# Patient Record
Sex: MALE | Race: WHITE | Employment: OTHER | URBAN - METROPOLITAN AREA
[De-identification: names, ages, dates, MRNs, and addresses within clinical notes are randomized per-mention and may not be internally consistent; named-entity substitution may affect disease eponyms.]

---

## 2017-01-03 RX ORDER — FUROSEMIDE 40 MG/1
TABLET ORAL
Qty: 30 TABLET | Refills: 3 | Status: SHIPPED | OUTPATIENT
Start: 2017-01-03 | End: 2017-06-20 | Stop reason: SDUPTHER

## 2017-01-06 ENCOUNTER — OFFICE VISIT (OUTPATIENT)
Dept: FAMILY MEDICINE CLINIC | Age: 82
End: 2017-01-06

## 2017-01-06 VITALS
HEART RATE: 76 BPM | SYSTOLIC BLOOD PRESSURE: 120 MMHG | BODY MASS INDEX: 45.89 KG/M2 | DIASTOLIC BLOOD PRESSURE: 70 MMHG | TEMPERATURE: 97.8 F | OXYGEN SATURATION: 92 % | WEIGHT: 293 LBS

## 2017-01-06 DIAGNOSIS — E11.9 TYPE 2 DIABETES MELLITUS WITHOUT COMPLICATION, WITHOUT LONG-TERM CURRENT USE OF INSULIN (HCC): Primary | ICD-10-CM

## 2017-01-06 DIAGNOSIS — I10 ESSENTIAL HYPERTENSION: ICD-10-CM

## 2017-01-06 DIAGNOSIS — M19.90 ARTHRITIS: ICD-10-CM

## 2017-01-06 DIAGNOSIS — C67.9 BLADDER CARCINOMA (HCC): ICD-10-CM

## 2017-01-06 DIAGNOSIS — I25.10 CORONARY ARTERY DISEASE INVOLVING NATIVE CORONARY ARTERY OF NATIVE HEART WITHOUT ANGINA PECTORIS: ICD-10-CM

## 2017-01-06 LAB
ALT SERPL-CCNC: 36 U/L (ref 10–40)
ANION GAP SERPL CALCULATED.3IONS-SCNC: 13 MMOL/L (ref 3–16)
BUN BLDV-MCNC: 18 MG/DL (ref 7–20)
CALCIUM SERPL-MCNC: 9.3 MG/DL (ref 8.3–10.6)
CHLORIDE BLD-SCNC: 95 MMOL/L (ref 99–110)
CHOLESTEROL, TOTAL: 144 MG/DL (ref 0–199)
CO2: 29 MMOL/L (ref 21–32)
CREAT SERPL-MCNC: <0.5 MG/DL (ref 0.8–1.3)
GFR AFRICAN AMERICAN: >60
GFR NON-AFRICAN AMERICAN: >60
GLUCOSE BLD-MCNC: 164 MG/DL (ref 70–99)
HDLC SERPL-MCNC: 36 MG/DL (ref 40–60)
LDL CHOLESTEROL CALCULATED: 66 MG/DL
POTASSIUM SERPL-SCNC: 4.6 MMOL/L (ref 3.5–5.1)
SODIUM BLD-SCNC: 137 MMOL/L (ref 136–145)
TRIGL SERPL-MCNC: 210 MG/DL (ref 0–150)
TSH REFLEX: 2.56 UIU/ML (ref 0.27–4.2)
VLDLC SERPL CALC-MCNC: 42 MG/DL

## 2017-01-06 PROCEDURE — 36415 COLL VENOUS BLD VENIPUNCTURE: CPT | Performed by: FAMILY MEDICINE

## 2017-01-06 PROCEDURE — 99214 OFFICE O/P EST MOD 30 MIN: CPT | Performed by: FAMILY MEDICINE

## 2017-01-06 RX ORDER — HYDROCODONE BITARTRATE AND ACETAMINOPHEN 5; 325 MG/1; MG/1
TABLET ORAL
Qty: 90 TABLET | Refills: 0 | Status: SHIPPED | OUTPATIENT
Start: 2017-01-06 | End: 2017-01-06 | Stop reason: SDUPTHER

## 2017-01-06 RX ORDER — METFORMIN HYDROCHLORIDE 500 MG/1
500 TABLET, EXTENDED RELEASE ORAL
Qty: 30 TABLET | Refills: 5 | Status: SHIPPED | OUTPATIENT
Start: 2017-01-06 | End: 2017-05-16 | Stop reason: SDUPTHER

## 2017-01-06 RX ORDER — HYDROCODONE BITARTRATE AND ACETAMINOPHEN 5; 325 MG/1; MG/1
TABLET ORAL
Qty: 90 TABLET | Refills: 0 | Status: SHIPPED | OUTPATIENT
Start: 2017-01-06 | End: 2017-05-16 | Stop reason: SDUPTHER

## 2017-01-06 ASSESSMENT — PATIENT HEALTH QUESTIONNAIRE - PHQ9
2. FEELING DOWN, DEPRESSED OR HOPELESS: 1
SUM OF ALL RESPONSES TO PHQ9 QUESTIONS 1 & 2: 1
SUM OF ALL RESPONSES TO PHQ QUESTIONS 1-9: 1
1. LITTLE INTEREST OR PLEASURE IN DOING THINGS: 0

## 2017-01-07 LAB
ESTIMATED AVERAGE GLUCOSE: 159.9 MG/DL
HBA1C MFR BLD: 7.2 %

## 2017-01-24 RX ORDER — ATORVASTATIN CALCIUM 40 MG/1
TABLET, FILM COATED ORAL
Qty: 30 TABLET | Refills: 5 | Status: SHIPPED | OUTPATIENT
Start: 2017-01-24 | End: 2017-08-17 | Stop reason: SDUPTHER

## 2017-02-01 RX ORDER — BUPROPION HYDROCHLORIDE 150 MG/1
TABLET ORAL
Qty: 30 TABLET | Refills: 0 | Status: SHIPPED | OUTPATIENT
Start: 2017-02-01 | End: 2017-03-29 | Stop reason: SDUPTHER

## 2017-02-01 RX ORDER — FLUTICASONE PROPIONATE 50 MCG
SPRAY, SUSPENSION (ML) NASAL
Qty: 16 G | Refills: 0 | Status: SHIPPED | OUTPATIENT
Start: 2017-02-01 | End: 2019-02-07

## 2017-02-23 RX ORDER — POTASSIUM CHLORIDE 750 MG/1
TABLET, EXTENDED RELEASE ORAL
Qty: 30 TABLET | Refills: 0 | Status: SHIPPED | OUTPATIENT
Start: 2017-02-23 | End: 2017-04-13 | Stop reason: SDUPTHER

## 2017-03-20 RX ORDER — TIZANIDINE 4 MG/1
TABLET ORAL
Qty: 30 TABLET | Refills: 3 | Status: SHIPPED | OUTPATIENT
Start: 2017-03-20 | End: 2017-08-17 | Stop reason: SDUPTHER

## 2017-03-29 RX ORDER — BUPROPION HYDROCHLORIDE 150 MG/1
TABLET ORAL
Qty: 30 TABLET | Refills: 5 | Status: SHIPPED | OUTPATIENT
Start: 2017-03-29 | End: 2017-11-01 | Stop reason: SDUPTHER

## 2017-04-07 RX ORDER — LISINOPRIL 10 MG/1
TABLET ORAL
Qty: 30 TABLET | Refills: 3 | Status: SHIPPED | OUTPATIENT
Start: 2017-04-07 | End: 2017-09-06 | Stop reason: SDUPTHER

## 2017-04-13 RX ORDER — POTASSIUM CHLORIDE 750 MG/1
TABLET, FILM COATED, EXTENDED RELEASE ORAL
Qty: 30 TABLET | Refills: 0 | Status: SHIPPED | OUTPATIENT
Start: 2017-04-13 | End: 2019-02-07

## 2017-05-05 RX ORDER — GLIPIZIDE 5 MG/1
TABLET ORAL
Qty: 30 TABLET | Refills: 5 | Status: SHIPPED | OUTPATIENT
Start: 2017-05-05 | End: 2017-12-08 | Stop reason: SDUPTHER

## 2017-05-16 ENCOUNTER — OFFICE VISIT (OUTPATIENT)
Dept: FAMILY MEDICINE CLINIC | Age: 82
End: 2017-05-16

## 2017-05-16 VITALS
WEIGHT: 301 LBS | DIASTOLIC BLOOD PRESSURE: 82 MMHG | OXYGEN SATURATION: 92 % | SYSTOLIC BLOOD PRESSURE: 130 MMHG | HEART RATE: 76 BPM | BODY MASS INDEX: 47.14 KG/M2 | TEMPERATURE: 98 F

## 2017-05-16 DIAGNOSIS — H26.9 CATARACT: ICD-10-CM

## 2017-05-16 DIAGNOSIS — Z01.818 PREOP EXAMINATION: Primary | ICD-10-CM

## 2017-05-16 DIAGNOSIS — E78.00 HYPERCHOLESTEREMIA: ICD-10-CM

## 2017-05-16 DIAGNOSIS — I10 ESSENTIAL HYPERTENSION: ICD-10-CM

## 2017-05-16 DIAGNOSIS — I25.10 CORONARY ARTERY DISEASE INVOLVING NATIVE CORONARY ARTERY OF NATIVE HEART WITHOUT ANGINA PECTORIS: ICD-10-CM

## 2017-05-16 DIAGNOSIS — J43.1 PANLOBULAR EMPHYSEMA (HCC): ICD-10-CM

## 2017-05-16 DIAGNOSIS — M19.90 ARTHRITIS: ICD-10-CM

## 2017-05-16 DIAGNOSIS — C67.9 BLADDER CARCINOMA (HCC): ICD-10-CM

## 2017-05-16 DIAGNOSIS — K21.9 GASTROESOPHAGEAL REFLUX DISEASE WITHOUT ESOPHAGITIS: ICD-10-CM

## 2017-05-16 DIAGNOSIS — E11.9 TYPE 2 DIABETES MELLITUS WITHOUT COMPLICATION, WITHOUT LONG-TERM CURRENT USE OF INSULIN (HCC): ICD-10-CM

## 2017-05-16 PROBLEM — Z86.69 HISTORY OF BELL'S PALSY: Status: ACTIVE | Noted: 2017-05-16

## 2017-05-16 LAB
ALT SERPL-CCNC: 27 U/L (ref 10–40)
ANION GAP SERPL CALCULATED.3IONS-SCNC: 13 MMOL/L (ref 3–16)
BASOPHILS ABSOLUTE: 0.1 K/UL (ref 0–0.2)
BASOPHILS RELATIVE PERCENT: 0.9 %
BUN BLDV-MCNC: 16 MG/DL (ref 7–20)
CALCIUM SERPL-MCNC: 9 MG/DL (ref 8.3–10.6)
CHLORIDE BLD-SCNC: 98 MMOL/L (ref 99–110)
CHOLESTEROL, TOTAL: 118 MG/DL (ref 0–199)
CO2: 28 MMOL/L (ref 21–32)
CREAT SERPL-MCNC: <0.5 MG/DL (ref 0.8–1.3)
EOSINOPHILS ABSOLUTE: 0.1 K/UL (ref 0–0.6)
EOSINOPHILS RELATIVE PERCENT: 1.5 %
GFR AFRICAN AMERICAN: >60
GFR NON-AFRICAN AMERICAN: >60
GLUCOSE BLD-MCNC: 146 MG/DL (ref 70–99)
HCT VFR BLD CALC: 43.3 % (ref 40.5–52.5)
HDLC SERPL-MCNC: 35 MG/DL (ref 40–60)
HEMOGLOBIN: 13.9 G/DL (ref 13.5–17.5)
LDL CHOLESTEROL CALCULATED: 52 MG/DL
LYMPHOCYTES ABSOLUTE: 0.9 K/UL (ref 1–5.1)
LYMPHOCYTES RELATIVE PERCENT: 15.2 %
MCH RBC QN AUTO: 30.7 PG (ref 26–34)
MCHC RBC AUTO-ENTMCNC: 32.1 G/DL (ref 31–36)
MCV RBC AUTO: 95.6 FL (ref 80–100)
MONOCYTES ABSOLUTE: 0.6 K/UL (ref 0–1.3)
MONOCYTES RELATIVE PERCENT: 9.2 %
NEUTROPHILS ABSOLUTE: 4.5 K/UL (ref 1.7–7.7)
NEUTROPHILS RELATIVE PERCENT: 73.2 %
PDW BLD-RTO: 14.4 % (ref 12.4–15.4)
PLATELET # BLD: 294 K/UL (ref 135–450)
PMV BLD AUTO: 8.5 FL (ref 5–10.5)
POTASSIUM SERPL-SCNC: 4.6 MMOL/L (ref 3.5–5.1)
RBC # BLD: 4.53 M/UL (ref 4.2–5.9)
SODIUM BLD-SCNC: 139 MMOL/L (ref 136–145)
TRIGL SERPL-MCNC: 155 MG/DL (ref 0–150)
TSH REFLEX: 1.86 UIU/ML (ref 0.27–4.2)
VLDLC SERPL CALC-MCNC: 31 MG/DL
WBC # BLD: 6.1 K/UL (ref 4–11)

## 2017-05-16 PROCEDURE — 4004F PT TOBACCO SCREEN RCVD TLK: CPT | Performed by: FAMILY MEDICINE

## 2017-05-16 PROCEDURE — 99215 OFFICE O/P EST HI 40 MIN: CPT | Performed by: FAMILY MEDICINE

## 2017-05-16 PROCEDURE — 36415 COLL VENOUS BLD VENIPUNCTURE: CPT | Performed by: FAMILY MEDICINE

## 2017-05-16 PROCEDURE — G8926 SPIRO NO PERF OR DOC: HCPCS | Performed by: FAMILY MEDICINE

## 2017-05-16 PROCEDURE — G8417 CALC BMI ABV UP PARAM F/U: HCPCS | Performed by: FAMILY MEDICINE

## 2017-05-16 PROCEDURE — 1123F ACP DISCUSS/DSCN MKR DOCD: CPT | Performed by: FAMILY MEDICINE

## 2017-05-16 PROCEDURE — G8599 NO ASA/ANTIPLAT THER USE RNG: HCPCS | Performed by: FAMILY MEDICINE

## 2017-05-16 PROCEDURE — 3023F SPIROM DOC REV: CPT | Performed by: FAMILY MEDICINE

## 2017-05-16 PROCEDURE — G8427 DOCREV CUR MEDS BY ELIG CLIN: HCPCS | Performed by: FAMILY MEDICINE

## 2017-05-16 PROCEDURE — 4040F PNEUMOC VAC/ADMIN/RCVD: CPT | Performed by: FAMILY MEDICINE

## 2017-05-16 PROCEDURE — 93000 ELECTROCARDIOGRAM COMPLETE: CPT | Performed by: FAMILY MEDICINE

## 2017-05-16 RX ORDER — METFORMIN HYDROCHLORIDE 500 MG/1
500 TABLET, EXTENDED RELEASE ORAL
Qty: 30 TABLET | Refills: 5 | Status: SHIPPED | OUTPATIENT
Start: 2017-05-16 | End: 2018-01-10 | Stop reason: SDUPTHER

## 2017-05-16 RX ORDER — HYDROCODONE BITARTRATE AND ACETAMINOPHEN 5; 325 MG/1; MG/1
TABLET ORAL
Qty: 90 TABLET | Refills: 0 | Status: SHIPPED | OUTPATIENT
Start: 2017-05-16 | End: 2017-06-26 | Stop reason: SDUPTHER

## 2017-05-16 RX ORDER — BROMFENAC SODIUM 0.7 MG/ML
SOLUTION/ DROPS OPHTHALMIC
COMMUNITY
Start: 2017-05-11 | End: 2017-07-05 | Stop reason: ALTCHOICE

## 2017-05-16 ASSESSMENT — ENCOUNTER SYMPTOMS
CHEST TIGHTNESS: 0
COLOR CHANGE: 0
ABDOMINAL DISTENTION: 0
BACK PAIN: 1
ABDOMINAL PAIN: 0
EYE ITCHING: 0
EYE DISCHARGE: 0
SHORTNESS OF BREATH: 0

## 2017-05-17 LAB
ESTIMATED AVERAGE GLUCOSE: 151.3 MG/DL
HBA1C MFR BLD: 6.9 %

## 2017-06-20 RX ORDER — POTASSIUM CHLORIDE 750 MG/1
TABLET, FILM COATED, EXTENDED RELEASE ORAL
Qty: 30 TABLET | Refills: 5 | Status: SHIPPED | OUTPATIENT
Start: 2017-06-20 | End: 2017-07-05 | Stop reason: ALTCHOICE

## 2017-06-20 RX ORDER — FUROSEMIDE 40 MG/1
TABLET ORAL
Qty: 30 TABLET | Refills: 5 | Status: SHIPPED | OUTPATIENT
Start: 2017-06-20 | End: 2018-01-19 | Stop reason: SDUPTHER

## 2017-06-27 RX ORDER — HYDROCODONE BITARTRATE AND ACETAMINOPHEN 5; 325 MG/1; MG/1
TABLET ORAL
Qty: 90 TABLET | Refills: 0 | Status: SHIPPED | OUTPATIENT
Start: 2017-06-27 | End: 2017-07-25 | Stop reason: SDUPTHER

## 2017-06-30 RX ORDER — FENOFIBRATE 200 MG/1
CAPSULE ORAL
Qty: 30 CAPSULE | Refills: 5 | Status: SHIPPED | OUTPATIENT
Start: 2017-06-30 | End: 2018-01-19 | Stop reason: SDUPTHER

## 2017-07-17 RX ORDER — MELOXICAM 7.5 MG/1
TABLET ORAL
Qty: 30 TABLET | Refills: 3 | Status: SHIPPED | OUTPATIENT
Start: 2017-07-17 | End: 2017-12-07 | Stop reason: SDUPTHER

## 2017-07-25 RX ORDER — HYDROCODONE BITARTRATE AND ACETAMINOPHEN 5; 325 MG/1; MG/1
TABLET ORAL
Qty: 90 TABLET | Refills: 0 | Status: SHIPPED | OUTPATIENT
Start: 2017-07-25 | End: 2017-08-22 | Stop reason: SDUPTHER

## 2017-08-17 RX ORDER — ATORVASTATIN CALCIUM 40 MG/1
TABLET, FILM COATED ORAL
Qty: 30 TABLET | Refills: 5 | Status: SHIPPED | OUTPATIENT
Start: 2017-08-17 | End: 2018-04-04 | Stop reason: SDUPTHER

## 2017-08-17 RX ORDER — TIZANIDINE 4 MG/1
TABLET ORAL
Qty: 30 TABLET | Refills: 5 | Status: SHIPPED | OUTPATIENT
Start: 2017-08-17 | End: 2018-03-23 | Stop reason: SDUPTHER

## 2017-08-23 RX ORDER — HYDROCODONE BITARTRATE AND ACETAMINOPHEN 5; 325 MG/1; MG/1
TABLET ORAL
Qty: 90 TABLET | Refills: 0 | Status: SHIPPED | OUTPATIENT
Start: 2017-08-23 | End: 2017-09-21 | Stop reason: SDUPTHER

## 2017-09-06 RX ORDER — LISINOPRIL 10 MG/1
TABLET ORAL
Qty: 30 TABLET | Refills: 5 | Status: SHIPPED | OUTPATIENT
Start: 2017-09-06 | End: 2018-03-23 | Stop reason: SDUPTHER

## 2017-09-14 ENCOUNTER — OFFICE VISIT (OUTPATIENT)
Dept: FAMILY MEDICINE CLINIC | Age: 82
End: 2017-09-14

## 2017-09-14 VITALS
WEIGHT: 284.2 LBS | SYSTOLIC BLOOD PRESSURE: 136 MMHG | BODY MASS INDEX: 45.87 KG/M2 | OXYGEN SATURATION: 93 % | TEMPERATURE: 97.8 F | DIASTOLIC BLOOD PRESSURE: 70 MMHG | HEART RATE: 73 BPM

## 2017-09-14 DIAGNOSIS — M19.90 ARTHRITIS: ICD-10-CM

## 2017-09-14 DIAGNOSIS — E11.9 TYPE 2 DIABETES MELLITUS WITHOUT COMPLICATION, WITHOUT LONG-TERM CURRENT USE OF INSULIN (HCC): Primary | ICD-10-CM

## 2017-09-14 DIAGNOSIS — I10 ESSENTIAL HYPERTENSION: ICD-10-CM

## 2017-09-14 DIAGNOSIS — I25.10 CORONARY ARTERY DISEASE INVOLVING NATIVE CORONARY ARTERY OF NATIVE HEART WITHOUT ANGINA PECTORIS: ICD-10-CM

## 2017-09-14 DIAGNOSIS — K21.9 GASTROESOPHAGEAL REFLUX DISEASE WITHOUT ESOPHAGITIS: ICD-10-CM

## 2017-09-14 LAB
ALT SERPL-CCNC: 28 U/L (ref 10–40)
ANION GAP SERPL CALCULATED.3IONS-SCNC: 13 MMOL/L (ref 3–16)
BUN BLDV-MCNC: 17 MG/DL (ref 7–20)
CALCIUM SERPL-MCNC: 9.3 MG/DL (ref 8.3–10.6)
CHLORIDE BLD-SCNC: 95 MMOL/L (ref 99–110)
CHOLESTEROL, TOTAL: 124 MG/DL (ref 0–199)
CO2: 31 MMOL/L (ref 21–32)
CREAT SERPL-MCNC: 0.6 MG/DL (ref 0.8–1.3)
GFR AFRICAN AMERICAN: >60
GFR NON-AFRICAN AMERICAN: >60
GLUCOSE BLD-MCNC: 152 MG/DL (ref 70–99)
HDLC SERPL-MCNC: 35 MG/DL (ref 40–60)
LDL CHOLESTEROL CALCULATED: 51 MG/DL
POTASSIUM SERPL-SCNC: 4.5 MMOL/L (ref 3.5–5.1)
SODIUM BLD-SCNC: 139 MMOL/L (ref 136–145)
TRIGL SERPL-MCNC: 190 MG/DL (ref 0–150)
TSH REFLEX: 2.89 UIU/ML (ref 0.27–4.2)
VLDLC SERPL CALC-MCNC: 38 MG/DL

## 2017-09-14 PROCEDURE — 99214 OFFICE O/P EST MOD 30 MIN: CPT | Performed by: FAMILY MEDICINE

## 2017-09-14 PROCEDURE — G8417 CALC BMI ABV UP PARAM F/U: HCPCS | Performed by: FAMILY MEDICINE

## 2017-09-14 PROCEDURE — 1123F ACP DISCUSS/DSCN MKR DOCD: CPT | Performed by: FAMILY MEDICINE

## 2017-09-14 PROCEDURE — G8598 ASA/ANTIPLAT THER USED: HCPCS | Performed by: FAMILY MEDICINE

## 2017-09-14 PROCEDURE — 4004F PT TOBACCO SCREEN RCVD TLK: CPT | Performed by: FAMILY MEDICINE

## 2017-09-14 PROCEDURE — 36415 COLL VENOUS BLD VENIPUNCTURE: CPT | Performed by: FAMILY MEDICINE

## 2017-09-14 PROCEDURE — G0008 ADMIN INFLUENZA VIRUS VAC: HCPCS | Performed by: FAMILY MEDICINE

## 2017-09-14 PROCEDURE — G8427 DOCREV CUR MEDS BY ELIG CLIN: HCPCS | Performed by: FAMILY MEDICINE

## 2017-09-14 PROCEDURE — 90688 IIV4 VACCINE SPLT 0.5 ML IM: CPT | Performed by: FAMILY MEDICINE

## 2017-09-14 PROCEDURE — 4040F PNEUMOC VAC/ADMIN/RCVD: CPT | Performed by: FAMILY MEDICINE

## 2017-09-15 LAB
ESTIMATED AVERAGE GLUCOSE: 165.7 MG/DL
HBA1C MFR BLD: 7.4 %

## 2017-09-18 ENCOUNTER — TELEPHONE (OUTPATIENT)
Dept: FAMILY MEDICINE CLINIC | Age: 82
End: 2017-09-18

## 2017-09-19 RX ORDER — AZITHROMYCIN 250 MG/1
TABLET, FILM COATED ORAL
Qty: 1 PACKET | Refills: 0 | Status: SHIPPED | OUTPATIENT
Start: 2017-09-19 | End: 2017-09-29

## 2017-09-22 RX ORDER — HYDROCODONE BITARTRATE AND ACETAMINOPHEN 5; 325 MG/1; MG/1
TABLET ORAL
Qty: 90 TABLET | Refills: 0 | Status: SHIPPED | OUTPATIENT
Start: 2017-09-22 | End: 2017-10-23 | Stop reason: SDUPTHER

## 2017-10-23 RX ORDER — HYDROCODONE BITARTRATE AND ACETAMINOPHEN 5; 325 MG/1; MG/1
TABLET ORAL
Qty: 90 TABLET | Refills: 0 | Status: SHIPPED | OUTPATIENT
Start: 2017-10-23 | End: 2017-11-21 | Stop reason: SDUPTHER

## 2017-11-01 RX ORDER — BUPROPION HYDROCHLORIDE 150 MG/1
TABLET ORAL
Qty: 30 TABLET | Refills: 5 | Status: SHIPPED | OUTPATIENT
Start: 2017-11-01 | End: 2018-06-11 | Stop reason: SDUPTHER

## 2017-11-21 RX ORDER — HYDROCODONE BITARTRATE AND ACETAMINOPHEN 5; 325 MG/1; MG/1
TABLET ORAL
Qty: 90 TABLET | Refills: 0 | Status: SHIPPED | OUTPATIENT
Start: 2017-11-21 | End: 2017-12-20 | Stop reason: SDUPTHER

## 2017-11-21 NOTE — TELEPHONE ENCOUNTER
Date of last refill of this med was 10/23, # of pills given 90 and # of refills given 0. Their next appointment is 1/18, the last date patient was seen was 9/14. Does patient have medication agreement on file? Yes  Has drug screen been done in last 12 months if needed? no  Has [place] report been ran in last 90 days?  Yes 10/23

## 2017-11-21 NOTE — TELEPHONE ENCOUNTER
Informed the patient that the medication has been printed out and is ready to be picked up at our office rc

## 2017-12-07 RX ORDER — MELOXICAM 7.5 MG/1
TABLET ORAL
Qty: 30 TABLET | Refills: 5 | Status: SHIPPED | OUTPATIENT
Start: 2017-12-07 | End: 2018-07-09 | Stop reason: SDUPTHER

## 2017-12-08 RX ORDER — GLIPIZIDE 5 MG/1
TABLET ORAL
Qty: 30 TABLET | Refills: 5 | Status: SHIPPED | OUTPATIENT
Start: 2017-12-08 | End: 2018-07-09 | Stop reason: SDUPTHER

## 2017-12-20 RX ORDER — HYDROCODONE BITARTRATE AND ACETAMINOPHEN 5; 325 MG/1; MG/1
TABLET ORAL
Qty: 90 TABLET | Refills: 0 | Status: SHIPPED | OUTPATIENT
Start: 2017-12-20 | End: 2018-01-18 | Stop reason: SDUPTHER

## 2018-01-10 RX ORDER — METFORMIN HYDROCHLORIDE 500 MG/1
TABLET, EXTENDED RELEASE ORAL
Qty: 30 TABLET | Refills: 0 | Status: SHIPPED | OUTPATIENT
Start: 2018-01-10 | End: 2018-03-12 | Stop reason: SDUPTHER

## 2018-01-18 ENCOUNTER — OFFICE VISIT (OUTPATIENT)
Dept: FAMILY MEDICINE CLINIC | Age: 83
End: 2018-01-18

## 2018-01-18 VITALS
TEMPERATURE: 98.1 F | SYSTOLIC BLOOD PRESSURE: 147 MMHG | OXYGEN SATURATION: 93 % | DIASTOLIC BLOOD PRESSURE: 73 MMHG | HEART RATE: 72 BPM | BODY MASS INDEX: 48.42 KG/M2 | WEIGHT: 300 LBS

## 2018-01-18 DIAGNOSIS — M19.90 ARTHRITIS: Primary | ICD-10-CM

## 2018-01-18 DIAGNOSIS — I10 ESSENTIAL HYPERTENSION: ICD-10-CM

## 2018-01-18 DIAGNOSIS — I25.10 CORONARY ARTERY DISEASE INVOLVING NATIVE CORONARY ARTERY OF NATIVE HEART WITHOUT ANGINA PECTORIS: ICD-10-CM

## 2018-01-18 DIAGNOSIS — Z99.89 OSA ON CPAP: ICD-10-CM

## 2018-01-18 DIAGNOSIS — G47.33 OSA ON CPAP: ICD-10-CM

## 2018-01-18 DIAGNOSIS — E78.00 HYPERCHOLESTEREMIA: ICD-10-CM

## 2018-01-18 DIAGNOSIS — E11.9 TYPE 2 DIABETES MELLITUS WITHOUT COMPLICATION, WITHOUT LONG-TERM CURRENT USE OF INSULIN (HCC): ICD-10-CM

## 2018-01-18 DIAGNOSIS — E66.01 MORBID OBESITY DUE TO EXCESS CALORIES (HCC): ICD-10-CM

## 2018-01-18 LAB
ALT SERPL-CCNC: 25 U/L (ref 10–40)
ANION GAP SERPL CALCULATED.3IONS-SCNC: 11 MMOL/L (ref 3–16)
BASOPHILS ABSOLUTE: 0 K/UL (ref 0–0.2)
BASOPHILS RELATIVE PERCENT: 0.4 %
BUN BLDV-MCNC: 15 MG/DL (ref 7–20)
CALCIUM SERPL-MCNC: 9.4 MG/DL (ref 8.3–10.6)
CHLORIDE BLD-SCNC: 96 MMOL/L (ref 99–110)
CHOLESTEROL, TOTAL: 129 MG/DL (ref 0–199)
CO2: 32 MMOL/L (ref 21–32)
CREAT SERPL-MCNC: 0.6 MG/DL (ref 0.8–1.3)
EOSINOPHILS ABSOLUTE: 0.1 K/UL (ref 0–0.6)
EOSINOPHILS RELATIVE PERCENT: 1.7 %
GFR AFRICAN AMERICAN: >60
GFR NON-AFRICAN AMERICAN: >60
GLUCOSE BLD-MCNC: 156 MG/DL (ref 70–99)
HCT VFR BLD CALC: 42.4 % (ref 40.5–52.5)
HDLC SERPL-MCNC: 35 MG/DL (ref 40–60)
HEMOGLOBIN: 14 G/DL (ref 13.5–17.5)
LDL CHOLESTEROL CALCULATED: 55 MG/DL
LYMPHOCYTES ABSOLUTE: 0.9 K/UL (ref 1–5.1)
LYMPHOCYTES RELATIVE PERCENT: 15.5 %
MCH RBC QN AUTO: 31.5 PG (ref 26–34)
MCHC RBC AUTO-ENTMCNC: 33.1 G/DL (ref 31–36)
MCV RBC AUTO: 95.1 FL (ref 80–100)
MONOCYTES ABSOLUTE: 0.5 K/UL (ref 0–1.3)
MONOCYTES RELATIVE PERCENT: 8.7 %
NEUTROPHILS ABSOLUTE: 4.3 K/UL (ref 1.7–7.7)
NEUTROPHILS RELATIVE PERCENT: 73.7 %
PDW BLD-RTO: 13.8 % (ref 12.4–15.4)
PLATELET # BLD: 290 K/UL (ref 135–450)
PMV BLD AUTO: 8.6 FL (ref 5–10.5)
POTASSIUM SERPL-SCNC: 4.6 MMOL/L (ref 3.5–5.1)
RBC # BLD: 4.45 M/UL (ref 4.2–5.9)
SODIUM BLD-SCNC: 139 MMOL/L (ref 136–145)
TRIGL SERPL-MCNC: 196 MG/DL (ref 0–150)
TSH REFLEX: 2.77 UIU/ML (ref 0.27–4.2)
VLDLC SERPL CALC-MCNC: 39 MG/DL
WBC # BLD: 5.9 K/UL (ref 4–11)

## 2018-01-18 PROCEDURE — G8417 CALC BMI ABV UP PARAM F/U: HCPCS | Performed by: FAMILY MEDICINE

## 2018-01-18 PROCEDURE — G8427 DOCREV CUR MEDS BY ELIG CLIN: HCPCS | Performed by: FAMILY MEDICINE

## 2018-01-18 PROCEDURE — 4040F PNEUMOC VAC/ADMIN/RCVD: CPT | Performed by: FAMILY MEDICINE

## 2018-01-18 PROCEDURE — G8599 NO ASA/ANTIPLAT THER USE RNG: HCPCS | Performed by: FAMILY MEDICINE

## 2018-01-18 PROCEDURE — G8484 FLU IMMUNIZE NO ADMIN: HCPCS | Performed by: FAMILY MEDICINE

## 2018-01-18 PROCEDURE — 4004F PT TOBACCO SCREEN RCVD TLK: CPT | Performed by: FAMILY MEDICINE

## 2018-01-18 PROCEDURE — 36415 COLL VENOUS BLD VENIPUNCTURE: CPT | Performed by: FAMILY MEDICINE

## 2018-01-18 PROCEDURE — 1123F ACP DISCUSS/DSCN MKR DOCD: CPT | Performed by: FAMILY MEDICINE

## 2018-01-18 PROCEDURE — 99214 OFFICE O/P EST MOD 30 MIN: CPT | Performed by: FAMILY MEDICINE

## 2018-01-18 RX ORDER — HYDROCODONE BITARTRATE AND ACETAMINOPHEN 5; 325 MG/1; MG/1
TABLET ORAL
Qty: 90 TABLET | Refills: 0 | Status: SHIPPED | OUTPATIENT
Start: 2018-01-18 | End: 2018-02-16 | Stop reason: SDUPTHER

## 2018-01-18 ASSESSMENT — PATIENT HEALTH QUESTIONNAIRE - PHQ9
2. FEELING DOWN, DEPRESSED OR HOPELESS: 0
SUM OF ALL RESPONSES TO PHQ9 QUESTIONS 1 & 2: 0
1. LITTLE INTEREST OR PLEASURE IN DOING THINGS: 0
SUM OF ALL RESPONSES TO PHQ QUESTIONS 1-9: 0

## 2018-01-18 NOTE — PROGRESS NOTES
Handout\" provided  Avoid exposure to all tobacco products. Read and consider all information provided by the pharmacy regarding prescribed medications before use  Careful medical compliance  Proper diet and weight management   Otherwise continue current treatment plan  Call or return if symptoms are not well controlled  Go to ED if severe/significant symptoms occur    All medical conditions for this patient are stable unless otherwise indicated    Malcolm Moonye MD    This note was transcribed using a voice recognition software system. Proper technique and careful oversight were used to increase transcription accuracy but inadvertent errors may be present.

## 2018-01-19 LAB
ESTIMATED AVERAGE GLUCOSE: 168.6 MG/DL
HBA1C MFR BLD: 7.5 %

## 2018-01-19 RX ORDER — FENOFIBRATE 200 MG/1
CAPSULE ORAL
Qty: 30 CAPSULE | Refills: 5 | Status: SHIPPED | OUTPATIENT
Start: 2018-01-19 | End: 2018-07-09 | Stop reason: SDUPTHER

## 2018-01-19 RX ORDER — FUROSEMIDE 40 MG/1
TABLET ORAL
Qty: 30 TABLET | Refills: 5 | Status: SHIPPED | OUTPATIENT
Start: 2018-01-19 | End: 2018-07-09 | Stop reason: SDUPTHER

## 2018-02-16 ENCOUNTER — TELEPHONE (OUTPATIENT)
Dept: FAMILY MEDICINE CLINIC | Age: 83
End: 2018-02-16

## 2018-02-16 DIAGNOSIS — M19.90 ARTHRITIS: ICD-10-CM

## 2018-02-16 RX ORDER — HYDROCODONE BITARTRATE AND ACETAMINOPHEN 5; 325 MG/1; MG/1
TABLET ORAL
Qty: 90 TABLET | Refills: 0 | Status: SHIPPED | OUTPATIENT
Start: 2018-02-16 | End: 2018-02-16 | Stop reason: SDUPTHER

## 2018-02-16 RX ORDER — HYDROCODONE BITARTRATE AND ACETAMINOPHEN 5; 325 MG/1; MG/1
1 TABLET ORAL 3 TIMES DAILY
Qty: 90 TABLET | Refills: 0
Start: 2018-02-16 | End: 2018-03-19 | Stop reason: SDUPTHER

## 2018-02-16 NOTE — TELEPHONE ENCOUNTER
Date of last refill of this med was 1/18, # of pills given 90 and # of refills given 0. Their next appointment is 5/22, the last date patient was seen was 1/18. Does patient have medication agreement on file? No  Has drug screen been done in last 12 months if needed? no  Has OAR[place] report been ran in last 90 days?  Yes 1/18

## 2018-03-13 RX ORDER — METFORMIN HYDROCHLORIDE 500 MG/1
TABLET, EXTENDED RELEASE ORAL
Qty: 30 TABLET | Refills: 5 | Status: SHIPPED | OUTPATIENT
Start: 2018-03-13 | End: 2018-10-06 | Stop reason: SDUPTHER

## 2018-03-19 DIAGNOSIS — M19.90 ARTHRITIS: ICD-10-CM

## 2018-03-19 RX ORDER — HYDROCODONE BITARTRATE AND ACETAMINOPHEN 5; 325 MG/1; MG/1
1 TABLET ORAL 3 TIMES DAILY
Qty: 90 TABLET | Refills: 0 | Status: SHIPPED | OUTPATIENT
Start: 2018-03-19 | End: 2018-04-18 | Stop reason: SDUPTHER

## 2018-03-23 RX ORDER — TIZANIDINE 4 MG/1
TABLET ORAL
Qty: 30 TABLET | Refills: 3 | Status: SHIPPED | OUTPATIENT
Start: 2018-03-23 | End: 2018-07-09 | Stop reason: SDUPTHER

## 2018-03-23 RX ORDER — LISINOPRIL 10 MG/1
TABLET ORAL
Qty: 30 TABLET | Refills: 3 | Status: SHIPPED | OUTPATIENT
Start: 2018-03-23 | End: 2018-07-09 | Stop reason: SDUPTHER

## 2018-04-04 RX ORDER — POTASSIUM CHLORIDE 750 MG/1
TABLET, EXTENDED RELEASE ORAL
Qty: 30 TABLET | Refills: 5 | Status: SHIPPED | OUTPATIENT
Start: 2018-04-04 | End: 2018-07-09 | Stop reason: SDUPTHER

## 2018-04-04 RX ORDER — ATORVASTATIN CALCIUM 40 MG/1
TABLET, FILM COATED ORAL
Qty: 30 TABLET | Refills: 5 | Status: SHIPPED | OUTPATIENT
Start: 2018-04-04 | End: 2018-07-09 | Stop reason: SDUPTHER

## 2018-04-18 DIAGNOSIS — M19.90 ARTHRITIS: ICD-10-CM

## 2018-04-18 RX ORDER — HYDROCODONE BITARTRATE AND ACETAMINOPHEN 5; 325 MG/1; MG/1
1 TABLET ORAL 3 TIMES DAILY
Qty: 90 TABLET | Refills: 0 | Status: SHIPPED | OUTPATIENT
Start: 2018-04-18 | End: 2018-05-16 | Stop reason: SDUPTHER

## 2018-05-16 DIAGNOSIS — M19.90 ARTHRITIS: ICD-10-CM

## 2018-05-16 RX ORDER — HYDROCODONE BITARTRATE AND ACETAMINOPHEN 5; 325 MG/1; MG/1
1 TABLET ORAL 3 TIMES DAILY
Qty: 90 TABLET | Refills: 0 | Status: SHIPPED | OUTPATIENT
Start: 2018-05-16 | End: 2018-06-15 | Stop reason: SDUPTHER

## 2018-06-11 ENCOUNTER — TELEPHONE (OUTPATIENT)
Dept: FAMILY MEDICINE CLINIC | Age: 83
End: 2018-06-11

## 2018-06-11 DIAGNOSIS — R26.81 UNSTEADY GAIT: Primary | ICD-10-CM

## 2018-06-12 RX ORDER — BUPROPION HYDROCHLORIDE 150 MG/1
TABLET ORAL
Qty: 30 TABLET | Refills: 5 | Status: SHIPPED | OUTPATIENT
Start: 2018-06-12 | End: 2019-01-12 | Stop reason: SDUPTHER

## 2018-06-13 DIAGNOSIS — M19.90 ARTHRITIS: ICD-10-CM

## 2018-06-13 RX ORDER — HYDROCODONE BITARTRATE AND ACETAMINOPHEN 5; 325 MG/1; MG/1
1 TABLET ORAL 3 TIMES DAILY
Qty: 90 TABLET | Refills: 0 | Status: CANCELLED | OUTPATIENT
Start: 2018-06-13 | End: 2018-07-13

## 2018-06-15 DIAGNOSIS — M19.90 ARTHRITIS: ICD-10-CM

## 2018-06-15 RX ORDER — HYDROCODONE BITARTRATE AND ACETAMINOPHEN 5; 325 MG/1; MG/1
1 TABLET ORAL 3 TIMES DAILY
Qty: 90 TABLET | Refills: 0 | Status: SHIPPED | OUTPATIENT
Start: 2018-06-15 | End: 2018-07-09 | Stop reason: SDUPTHER

## 2018-07-09 ENCOUNTER — OFFICE VISIT (OUTPATIENT)
Dept: FAMILY MEDICINE CLINIC | Age: 83
End: 2018-07-09

## 2018-07-09 VITALS
DIASTOLIC BLOOD PRESSURE: 80 MMHG | OXYGEN SATURATION: 90 % | HEIGHT: 67 IN | WEIGHT: 292.4 LBS | SYSTOLIC BLOOD PRESSURE: 140 MMHG | HEART RATE: 76 BPM | BODY MASS INDEX: 45.89 KG/M2

## 2018-07-09 DIAGNOSIS — K21.9 GASTROESOPHAGEAL REFLUX DISEASE WITHOUT ESOPHAGITIS: ICD-10-CM

## 2018-07-09 DIAGNOSIS — Z99.89 OSA ON CPAP: ICD-10-CM

## 2018-07-09 DIAGNOSIS — Z91.81 AT HIGH RISK FOR FALLS: ICD-10-CM

## 2018-07-09 DIAGNOSIS — I10 ESSENTIAL HYPERTENSION: ICD-10-CM

## 2018-07-09 DIAGNOSIS — G47.33 OSA ON CPAP: ICD-10-CM

## 2018-07-09 DIAGNOSIS — E11.9 TYPE 2 DIABETES MELLITUS WITHOUT COMPLICATION, WITHOUT LONG-TERM CURRENT USE OF INSULIN (HCC): Primary | ICD-10-CM

## 2018-07-09 DIAGNOSIS — M19.90 ARTHRITIS: ICD-10-CM

## 2018-07-09 LAB
A/G RATIO: 1.6 (ref 1.1–2.2)
ALBUMIN SERPL-MCNC: 4.1 G/DL (ref 3.4–5)
ALP BLD-CCNC: 35 U/L (ref 40–129)
ALT SERPL-CCNC: 27 U/L (ref 10–40)
ANION GAP SERPL CALCULATED.3IONS-SCNC: 12 MMOL/L (ref 3–16)
AST SERPL-CCNC: 22 U/L (ref 15–37)
BILIRUB SERPL-MCNC: 0.4 MG/DL (ref 0–1)
BUN BLDV-MCNC: 17 MG/DL (ref 7–20)
CALCIUM SERPL-MCNC: 9.7 MG/DL (ref 8.3–10.6)
CHLORIDE BLD-SCNC: 97 MMOL/L (ref 99–110)
CO2: 30 MMOL/L (ref 21–32)
CREAT SERPL-MCNC: 0.5 MG/DL (ref 0.8–1.3)
GFR AFRICAN AMERICAN: >60
GFR NON-AFRICAN AMERICAN: >60
GLOBULIN: 2.6 G/DL
GLUCOSE BLD-MCNC: 147 MG/DL (ref 70–99)
POTASSIUM SERPL-SCNC: 4.5 MMOL/L (ref 3.5–5.1)
SODIUM BLD-SCNC: 139 MMOL/L (ref 136–145)
TOTAL PROTEIN: 6.7 G/DL (ref 6.4–8.2)

## 2018-07-09 PROCEDURE — 36415 COLL VENOUS BLD VENIPUNCTURE: CPT | Performed by: NURSE PRACTITIONER

## 2018-07-09 PROCEDURE — G8427 DOCREV CUR MEDS BY ELIG CLIN: HCPCS | Performed by: NURSE PRACTITIONER

## 2018-07-09 PROCEDURE — 1123F ACP DISCUSS/DSCN MKR DOCD: CPT | Performed by: NURSE PRACTITIONER

## 2018-07-09 PROCEDURE — G8599 NO ASA/ANTIPLAT THER USE RNG: HCPCS | Performed by: NURSE PRACTITIONER

## 2018-07-09 PROCEDURE — 99213 OFFICE O/P EST LOW 20 MIN: CPT | Performed by: NURSE PRACTITIONER

## 2018-07-09 PROCEDURE — G8417 CALC BMI ABV UP PARAM F/U: HCPCS | Performed by: NURSE PRACTITIONER

## 2018-07-09 PROCEDURE — 4040F PNEUMOC VAC/ADMIN/RCVD: CPT | Performed by: NURSE PRACTITIONER

## 2018-07-09 PROCEDURE — 4004F PT TOBACCO SCREEN RCVD TLK: CPT | Performed by: NURSE PRACTITIONER

## 2018-07-09 RX ORDER — ATORVASTATIN CALCIUM 40 MG/1
TABLET, FILM COATED ORAL
Qty: 30 TABLET | Refills: 5 | Status: SHIPPED | OUTPATIENT
Start: 2018-07-09 | End: 2019-05-15 | Stop reason: SDUPTHER

## 2018-07-09 RX ORDER — FENOFIBRATE 200 MG/1
CAPSULE ORAL
Qty: 30 CAPSULE | Refills: 5 | Status: SHIPPED | OUTPATIENT
Start: 2018-07-09 | End: 2019-02-27 | Stop reason: SDUPTHER

## 2018-07-09 RX ORDER — POTASSIUM CHLORIDE 750 MG/1
TABLET, EXTENDED RELEASE ORAL
Qty: 30 TABLET | Refills: 5 | Status: SHIPPED | OUTPATIENT
Start: 2018-07-09 | End: 2019-01-01

## 2018-07-09 RX ORDER — GLIPIZIDE 5 MG/1
TABLET ORAL
Qty: 30 TABLET | Refills: 5 | Status: SHIPPED | OUTPATIENT
Start: 2018-07-09 | End: 2019-01-03 | Stop reason: SDUPTHER

## 2018-07-09 RX ORDER — FUROSEMIDE 40 MG/1
TABLET ORAL
Qty: 30 TABLET | Refills: 5 | Status: SHIPPED | OUTPATIENT
Start: 2018-07-09 | End: 2019-03-15 | Stop reason: SDUPTHER

## 2018-07-09 RX ORDER — TIZANIDINE 4 MG/1
TABLET ORAL
Qty: 30 TABLET | Refills: 5 | Status: SHIPPED | OUTPATIENT
Start: 2018-07-09 | End: 2019-02-15 | Stop reason: SDUPTHER

## 2018-07-09 RX ORDER — MELOXICAM 7.5 MG/1
TABLET ORAL
Qty: 30 TABLET | Refills: 5 | Status: SHIPPED | OUTPATIENT
Start: 2018-07-09 | End: 2018-12-19 | Stop reason: SDUPTHER

## 2018-07-09 RX ORDER — LISINOPRIL 10 MG/1
TABLET ORAL
Qty: 30 TABLET | Refills: 5 | Status: SHIPPED | OUTPATIENT
Start: 2018-07-09 | End: 2019-02-07 | Stop reason: SDUPTHER

## 2018-07-09 RX ORDER — HYDROCODONE BITARTRATE AND ACETAMINOPHEN 5; 325 MG/1; MG/1
1 TABLET ORAL 3 TIMES DAILY
Qty: 90 TABLET | Refills: 0 | Status: SHIPPED | OUTPATIENT
Start: 2018-07-09 | End: 2018-08-14 | Stop reason: SDUPTHER

## 2018-07-09 ASSESSMENT — ENCOUNTER SYMPTOMS
EYES NEGATIVE: 1
ALLERGIC/IMMUNOLOGIC NEGATIVE: 1
RESPIRATORY NEGATIVE: 1

## 2018-07-09 NOTE — PATIENT INSTRUCTIONS
Please read the healthy family handout that you were given and share it with your family. Please compare this printed medication list with your medications at home to be sure they are the same. If you have any medications that are different please contact us immediately at [phone]. Also review your allergies that we have listed, these may also include medications that you have not been able to tolerate, make sure everything listed is correct. If you have any allergies that are different please contact us immediately at [phone]. Patient Education        How to Read a Food Label to Limit Sodium: Care Instructions  Your Care Instructions  Sodium causes your body to hold on to extra water. This can raise your blood pressure and force your heart and kidneys to work harder. In very serious cases, this could cause you to be put in the hospital. It might even be life-threatening. By limiting sodium, you will feel better and lower your risk of serious problems. Processed foods, fast food, and restaurant foods are the major sources of dietary sodium. The most common name for sodium is salt. Try to limit how much sodium you eat to less than 2,300 milligrams (mg) a day. If you limit your sodium to 1,500 mg a day, you can lower your blood pressure even more. This limit counts all the salt that you eat in foods you cook or in packaged foods. Keep a list of everything you eat and drink. Follow-up care is a key part of your treatment and safety. Be sure to make and go to all appointments, and call your doctor if you are having problems. It's also a good idea to know your test results and keep a list of the medicines you take. How can you care for yourself at home? Read ingredient lists on food labels  · Read the list of ingredients on food labels to help you find how much sodium is in a food. The label lists the ingredients in a food in descending order (from the most to the least).  If salt or sodium is high on you take. How can you care for yourself at home? Read food labels  · Read labels on cans and food packages. The labels tell you how much sodium is in each serving. Make sure that you look at the serving size. If you eat more than the serving size, you have eaten more sodium. · Food labels also tell you the Percent Daily Value for sodium. Choose products with low Percent Daily Values for sodium. · Be aware that sodium can come in forms other than salt, including monosodium glutamate (MSG), sodium citrate, and sodium bicarbonate (baking soda). MSG is often added to Asian food. When you eat out, you can sometimes ask for food without MSG or added salt. Buy low-sodium foods  · Buy foods that are labeled \"unsalted\" (no salt added), \"sodium-free\" (less than 5 mg of sodium per serving), or \"low-sodium\" (less than 140 mg of sodium per serving). Foods labeled \"reduced-sodium\" and \"light sodium\" may still have too much sodium. Be sure to read the label to see how much sodium you are getting. · Buy fresh vegetables, or frozen vegetables without added sauces. Buy low-sodium versions of canned vegetables, soups, and other canned goods. Prepare low-sodium meals  · Cut back on the amount of salt you use in cooking. This will help you adjust to the taste. Do not add salt after cooking. One teaspoon of salt has about 2,300 mg of sodium. · Take the salt shaker off the table. · Flavor your food with garlic, lemon juice, onion, vinegar, herbs, and spices. Do not use soy sauce, lite soy sauce, steak sauce, onion salt, garlic salt, celery salt, mustard, or ketchup on your food. · Use low-sodium salad dressings, sauces, and ketchup. Or make your own salad dressings and sauces without adding salt. · Use less salt (or none) when recipes call for it. You can often use half the salt a recipe calls for without losing flavor. Other foods such as rice, pasta, and grains do not need added salt.   · Rinse canned vegetables, and cook them in fresh water. This removes some-but not all-of the salt. · Avoid water that is naturally high in sodium or that has been treated with water softeners, which add sodium. Call your local water company to find out the sodium content of your water supply. If you buy bottled water, read the label and choose a sodium-free brand. Avoid high-sodium foods  · Avoid eating:  ¨ Smoked, cured, salted, and canned meat, fish, and poultry. ¨ Ham, shepherd, hot dogs, and luncheon meats. ¨ Regular, hard, and processed cheese and regular peanut butter. ¨ Crackers with salted tops, and other salted snack foods such as pretzels, chips, and salted popcorn. ¨ Frozen prepared meals, unless labeled low-sodium. ¨ Canned and dried soups, broths, and bouillon, unless labeled sodium-free or low-sodium. ¨ Canned vegetables, unless labeled sodium-free or low-sodium. ¨ Western Zully fries, pizza, tacos, and other fast foods. ¨ Pickles, olives, ketchup, and other condiments, especially soy sauce, unless labeled sodium-free or low-sodium. Where can you learn more? Go to https://Embedly.[email or web address]. org and sign in to your [employer] account. Enter J235 in the IMshopping box to learn more about \"Low Sodium Diet (2,000 Milligram): Care Instructions. \"     If you do not have an account, please click on the \"Sign Up Now\" link. Current as of: May 12, 2017  Content Version: 11.6  © 4099-0615 [employer]. Care instructions adapted under license by [facility] ([facility]). If you have questions about a medical condition or this instruction, always ask your healthcare professional. [place] [address] any warranty or liability for your use of this information.

## 2018-07-09 NOTE — PROGRESS NOTES
Subjective:      Patient ID: Mercedes Levin is a 80 y.o. male. HPI    Chief Complaint   Patient presents with    Check-Up       Diabetes Mellitus Type 2: Current symptoms/problems include none. Medication compliance:  compliant all of the time  Diabetic diet compliance:  compliant most of the time,  Weight trend: stable  Current exercise: no regular exercise  Barriers: physical limitations    Home blood sugar records: fasting range: 150  Any episodes of hypoglycemia? no  Eye exam current (within one year): yes   reports that he quit smoking about 8 years ago. He has a 70.00 pack-year smoking history. His smokeless tobacco use includes Chew. Daily Aspirin? Yes    Lab Results   Component Value Date    LABA1C 7.5 01/18/2018    LABA1C 7.4 09/14/2017    LABA1C 6.9 05/16/2017     Lab Results   Component Value Date    CREATININE 0.6 (L) 01/18/2018     Lab Results   Component Value Date    ALT 25 01/18/2018    AST 35 07/04/2016     Lab Results   Component Value Date    CHOL 129 01/18/2018    TRIG 196 (H) 01/18/2018    HDL 35 (L) 01/18/2018    LDLCALC 55 01/18/2018    LDLDIRECT 111 (H) 07/04/2016          Hypertension:  Home blood pressure monitoring: No.  He is adherent to a low sodium diet. Patient denies chest pain, shortness of breath, headache, lightheadedness, blurred vision, palpitations, dry cough and fatigue. Antihypertensive medication side effects: no medication side effects noted. Use of agents associated with hypertension: none. Sodium (mmol/L)   Date Value   01/18/2018 139    BUN (mg/dL)   Date Value   01/18/2018 15    Glucose (mg/dL)   Date Value   01/18/2018 156 (H)      Potassium (mmol/L)   Date Value   01/18/2018 4.6    CREATININE (mg/dL)   Date Value   01/18/2018 0.6 (L)         Review of Systems   Constitutional: Negative for appetite change, chills and fever. HENT: Negative. Eyes: Negative. Respiratory: Negative.     Cardiovascular: Positive lower legs   Pulmonary/Chest: Effort normal and breath sounds normal. No accessory muscle usage. No respiratory distress. Abdominal: Soft. Bowel sounds are normal. There is no hepatosplenomegaly. Lymphadenopathy:     He has no cervical adenopathy. Neurological: He is alert and oriented to person, place, and time. Skin: Skin is warm and dry. No rash noted. Psychiatric: He has a normal mood and affect. His speech is normal and behavior is normal.       Assessment/Plan:   1. Type 2 diabetes mellitus without complication, without long-term current use of insulin (HCC)  Patient presents today to follow-up on chronic conditions including diabetes, hypertension, obstructive sleep apnea, GERD and arthritis. Last A1c was 7.51/2018. Patient reports fasting blood sugars typically run in the 150s. Patient denies any hypoglycemic episodes and reports he did have eyes examined within the past one year. Patient's spouse recently passed away and daughter has been assisting patient with ADLs and IADL's. Discussed importance of tight glycemic control and prevention of hypoglycemic episodes. Recommend patient continue with current medication regimen and follow up in office in 3-4 months or sooner with problems or concerns. Patient/daughter verbalized understanding and agreeable to plan. - Hemoglobin A1C    2. Essential hypertension  Blood pressure is fairly well controlled. Advised patient to continue with current medication regimen. Order for CMP as below. Discussed importance of low sodium diet. Reviewed sources of high sodium content and discuss label reading. Also see patient instructions. - Comprehensive Metabolic Panel    3. GENESIS on CPAP  Stable with use of CPAP    4. Gastroesophageal reflux disease without esophagitis  stable    5. Arthritis  Patient with chronic arthritis pain of multiple joints. Oarrs reviewed without concerns. Refill a below.    Controlled Substances Monitoring:     RX Monitoring

## 2018-07-10 LAB
ESTIMATED AVERAGE GLUCOSE: 159.9 MG/DL
HBA1C MFR BLD: 7.2 %

## 2018-08-02 ENCOUNTER — TELEPHONE (OUTPATIENT)
Dept: FAMILY MEDICINE CLINIC | Age: 83
End: 2018-08-02

## 2018-08-02 DIAGNOSIS — R26.81 UNSTEADY GAIT: Primary | ICD-10-CM

## 2018-08-14 DIAGNOSIS — M19.90 ARTHRITIS: ICD-10-CM

## 2018-08-14 RX ORDER — HYDROCODONE BITARTRATE AND ACETAMINOPHEN 5; 325 MG/1; MG/1
1 TABLET ORAL 3 TIMES DAILY
Qty: 90 TABLET | Refills: 0 | Status: SHIPPED | OUTPATIENT
Start: 2018-08-14 | End: 2018-09-13 | Stop reason: SDUPTHER

## 2018-09-11 ENCOUNTER — TELEPHONE (OUTPATIENT)
Dept: FAMILY MEDICINE CLINIC | Age: 83
End: 2018-09-11

## 2018-09-11 NOTE — TELEPHONE ENCOUNTER
[facility] care nurse calling to report patient fell on Sunday, he missed a step small skin tear on knee, just fyi.

## 2018-09-13 DIAGNOSIS — M19.90 ARTHRITIS: ICD-10-CM

## 2018-09-13 RX ORDER — HYDROCODONE BITARTRATE AND ACETAMINOPHEN 5; 325 MG/1; MG/1
1 TABLET ORAL 3 TIMES DAILY
Qty: 90 TABLET | Refills: 0 | Status: SHIPPED | OUTPATIENT
Start: 2018-09-13 | End: 2018-10-09 | Stop reason: SDUPTHER

## 2018-09-13 NOTE — TELEPHONE ENCOUNTER
Controlled Substances Monitoring:     RX Monitoring 9/13/2018   Attestation The Prescription Monitoring Report for this patient was reviewed today. Documentation No signs of potential drug abuse or diversion identified.

## 2018-10-08 RX ORDER — METFORMIN HYDROCHLORIDE 500 MG/1
TABLET, EXTENDED RELEASE ORAL
Qty: 30 TABLET | Refills: 0 | Status: SHIPPED | OUTPATIENT
Start: 2018-10-08 | End: 2018-12-04 | Stop reason: SDUPTHER

## 2018-10-09 ENCOUNTER — OFFICE VISIT (OUTPATIENT)
Dept: FAMILY MEDICINE CLINIC | Age: 83
End: 2018-10-09
Payer: MEDICARE

## 2018-10-09 VITALS
WEIGHT: 294 LBS | DIASTOLIC BLOOD PRESSURE: 70 MMHG | BODY MASS INDEX: 46.05 KG/M2 | TEMPERATURE: 97.7 F | HEART RATE: 82 BPM | SYSTOLIC BLOOD PRESSURE: 142 MMHG | OXYGEN SATURATION: 94 %

## 2018-10-09 DIAGNOSIS — I25.10 CORONARY ARTERY DISEASE INVOLVING NATIVE CORONARY ARTERY OF NATIVE HEART WITHOUT ANGINA PECTORIS: ICD-10-CM

## 2018-10-09 DIAGNOSIS — J43.1 PANLOBULAR EMPHYSEMA (HCC): ICD-10-CM

## 2018-10-09 DIAGNOSIS — Z23 NEED FOR IMMUNIZATION AGAINST INFLUENZA: ICD-10-CM

## 2018-10-09 DIAGNOSIS — C67.9 BLADDER CARCINOMA (HCC): ICD-10-CM

## 2018-10-09 DIAGNOSIS — I10 ESSENTIAL HYPERTENSION: ICD-10-CM

## 2018-10-09 DIAGNOSIS — E11.9 TYPE 2 DIABETES MELLITUS WITHOUT COMPLICATION, WITHOUT LONG-TERM CURRENT USE OF INSULIN (HCC): Primary | ICD-10-CM

## 2018-10-09 DIAGNOSIS — Z99.89 OSA ON CPAP: ICD-10-CM

## 2018-10-09 DIAGNOSIS — N40.1 BENIGN PROSTATIC HYPERPLASIA WITH URINARY OBSTRUCTION: ICD-10-CM

## 2018-10-09 DIAGNOSIS — N13.8 BENIGN PROSTATIC HYPERPLASIA WITH URINARY OBSTRUCTION: ICD-10-CM

## 2018-10-09 DIAGNOSIS — G47.33 OSA ON CPAP: ICD-10-CM

## 2018-10-09 DIAGNOSIS — M19.90 ARTHRITIS: ICD-10-CM

## 2018-10-09 DIAGNOSIS — E78.00 HYPERCHOLESTEREMIA: ICD-10-CM

## 2018-10-09 DIAGNOSIS — K21.9 GASTROESOPHAGEAL REFLUX DISEASE WITHOUT ESOPHAGITIS: ICD-10-CM

## 2018-10-09 LAB
A/G RATIO: 1.7 (ref 1.1–2.2)
ALBUMIN SERPL-MCNC: 4.1 G/DL (ref 3.4–5)
ALP BLD-CCNC: 33 U/L (ref 40–129)
ALT SERPL-CCNC: 26 U/L (ref 10–40)
ANION GAP SERPL CALCULATED.3IONS-SCNC: 13 MMOL/L (ref 3–16)
AST SERPL-CCNC: 21 U/L (ref 15–37)
BILIRUB SERPL-MCNC: 0.4 MG/DL (ref 0–1)
BUN BLDV-MCNC: 16 MG/DL (ref 7–20)
CALCIUM SERPL-MCNC: 9.3 MG/DL (ref 8.3–10.6)
CHLORIDE BLD-SCNC: 100 MMOL/L (ref 99–110)
CO2: 29 MMOL/L (ref 21–32)
CREAT SERPL-MCNC: 0.5 MG/DL (ref 0.8–1.3)
GFR AFRICAN AMERICAN: >60
GFR NON-AFRICAN AMERICAN: >60
GLOBULIN: 2.4 G/DL
GLUCOSE BLD-MCNC: 160 MG/DL (ref 70–99)
POTASSIUM SERPL-SCNC: 4.6 MMOL/L (ref 3.5–5.1)
SODIUM BLD-SCNC: 142 MMOL/L (ref 136–145)
TOTAL PROTEIN: 6.5 G/DL (ref 6.4–8.2)

## 2018-10-09 PROCEDURE — G8482 FLU IMMUNIZE ORDER/ADMIN: HCPCS | Performed by: NURSE PRACTITIONER

## 2018-10-09 PROCEDURE — 1101F PT FALLS ASSESS-DOCD LE1/YR: CPT | Performed by: NURSE PRACTITIONER

## 2018-10-09 PROCEDURE — G8417 CALC BMI ABV UP PARAM F/U: HCPCS | Performed by: NURSE PRACTITIONER

## 2018-10-09 PROCEDURE — 36415 COLL VENOUS BLD VENIPUNCTURE: CPT | Performed by: NURSE PRACTITIONER

## 2018-10-09 PROCEDURE — 4040F PNEUMOC VAC/ADMIN/RCVD: CPT | Performed by: NURSE PRACTITIONER

## 2018-10-09 PROCEDURE — G8926 SPIRO NO PERF OR DOC: HCPCS | Performed by: NURSE PRACTITIONER

## 2018-10-09 PROCEDURE — G8599 NO ASA/ANTIPLAT THER USE RNG: HCPCS | Performed by: NURSE PRACTITIONER

## 2018-10-09 PROCEDURE — 99214 OFFICE O/P EST MOD 30 MIN: CPT | Performed by: NURSE PRACTITIONER

## 2018-10-09 PROCEDURE — G0008 ADMIN INFLUENZA VIRUS VAC: HCPCS | Performed by: NURSE PRACTITIONER

## 2018-10-09 PROCEDURE — 1123F ACP DISCUSS/DSCN MKR DOCD: CPT | Performed by: NURSE PRACTITIONER

## 2018-10-09 PROCEDURE — 3023F SPIROM DOC REV: CPT | Performed by: NURSE PRACTITIONER

## 2018-10-09 PROCEDURE — 90662 IIV NO PRSV INCREASED AG IM: CPT | Performed by: NURSE PRACTITIONER

## 2018-10-09 PROCEDURE — G8427 DOCREV CUR MEDS BY ELIG CLIN: HCPCS | Performed by: NURSE PRACTITIONER

## 2018-10-09 PROCEDURE — 4004F PT TOBACCO SCREEN RCVD TLK: CPT | Performed by: NURSE PRACTITIONER

## 2018-10-09 RX ORDER — HYDROCODONE BITARTRATE AND ACETAMINOPHEN 5; 325 MG/1; MG/1
1 TABLET ORAL 3 TIMES DAILY
Qty: 90 TABLET | Refills: 0 | Status: SHIPPED | OUTPATIENT
Start: 2018-10-09 | End: 2018-11-07 | Stop reason: SDUPTHER

## 2018-10-09 ASSESSMENT — ENCOUNTER SYMPTOMS
GASTROINTESTINAL NEGATIVE: 1
COUGH: 0
EYES NEGATIVE: 1
WHEEZING: 0
SHORTNESS OF BREATH: 1

## 2018-10-09 NOTE — PROGRESS NOTES
normal, normal heart sounds and intact distal pulses. Exam reveals no gallop. No murmur heard. Pulmonary/Chest: Effort normal and breath sounds normal. No accessory muscle usage. No respiratory distress. Abdominal: Soft. Bowel sounds are normal.   Musculoskeletal:   In wheelchair. Has difficulty walking. Recently completed home PT. Lymphadenopathy:     He has no cervical adenopathy. Neurological: He is alert and oriented to person, place, and time. Skin: Skin is warm and dry. No rash noted. Psychiatric: He has a normal mood and affect. His speech is normal and behavior is normal.       Assessment/Plan:   1. Type 2 diabetes mellitus without complication, without long-term current use of insulin (HCC)  Patient presents today to follow-up on chronic conditions including hypertension, diabetes, hyperlipidemia and GERD. Patient reports fasting blood sugars typically range 150-160 which is unchanged. Most recent A1c was 7.2. Discussed A1c gaol with patient and I recommend patient continue with current medication regimen, continue to monitor blood sugars and keep a log and follow up in office in 4 months or sooner with problems or concerns. Agree goal for A1C 7.0 - 7.5. Patient verbalized understanding and agreeable to plan. - Hemoglobin A1C    2. Essential hypertension  Blood pressure slightly elevated however not significantly. Repeat was better. Recommend patient continue with current medication regimen I did review low sodium diet with patient and granddaughter during visit. Recommend no more than 2-3 g of sodium per day. Also advised patient to monitor peripheral edema and shortness of breath. Advised to follow-up immediately should he become more short of breath and swelling increase. Patient verbalized understanding and agreeable to plan. Order for repeat labs as below. - Comprehensive Metabolic Panel    3. Panlobular emphysema (HCC)  Stable. Patient does follow with pulmonology.

## 2018-10-10 LAB
ESTIMATED AVERAGE GLUCOSE: 159.9 MG/DL
HBA1C MFR BLD: 7.2 %

## 2018-10-26 ENCOUNTER — TELEPHONE (OUTPATIENT)
Dept: FAMILY MEDICINE CLINIC | Age: 83
End: 2018-10-26

## 2018-10-26 RX ORDER — CEFDINIR 300 MG/1
300 CAPSULE ORAL 2 TIMES DAILY
Qty: 20 CAPSULE | Refills: 0 | Status: SHIPPED | OUTPATIENT
Start: 2018-10-26 | End: 2018-11-05

## 2018-10-26 NOTE — TELEPHONE ENCOUNTER
Patient's daughter calling wanting to know if you would consider calling in antibiotic for patient he will not come for visit, he has been sick for over a week, cough, head/chest congestion, fever hot, cold patient states she just got over the same thing and was prescribed omnicef, she is afraid will turn to pneumonia and he is stubborn since his wife passed, please advise.

## 2018-11-07 DIAGNOSIS — M19.90 ARTHRITIS: ICD-10-CM

## 2018-11-07 RX ORDER — HYDROCODONE BITARTRATE AND ACETAMINOPHEN 5; 325 MG/1; MG/1
1 TABLET ORAL 3 TIMES DAILY
Qty: 90 TABLET | Refills: 0 | Status: SHIPPED | OUTPATIENT
Start: 2018-11-07 | End: 2018-12-11 | Stop reason: SDUPTHER

## 2018-11-07 NOTE — TELEPHONE ENCOUNTER
Date of last refill of this med was 10-9-18, # of pills given 90 and # of refills given 0. Their next appointment is not scheduled, the last date patient was seen was 10-9-18. Does patient have medication agreement on file? Yes  Has drug screen been done in last 12 months if needed? no  Has OAR[place] report been ran in last 90 days?  Yes 11-7-18 Please review (must input date)

## 2018-11-09 DIAGNOSIS — E11.9 TYPE 2 DIABETES MELLITUS WITHOUT COMPLICATION, WITHOUT LONG-TERM CURRENT USE OF INSULIN (HCC): Primary | ICD-10-CM

## 2018-12-04 RX ORDER — METFORMIN HYDROCHLORIDE 500 MG/1
TABLET, EXTENDED RELEASE ORAL
Qty: 30 TABLET | Refills: 3 | Status: SHIPPED | OUTPATIENT
Start: 2018-12-04 | End: 2019-04-03 | Stop reason: SDUPTHER

## 2018-12-11 DIAGNOSIS — M19.90 ARTHRITIS: ICD-10-CM

## 2018-12-11 RX ORDER — HYDROCODONE BITARTRATE AND ACETAMINOPHEN 5; 325 MG/1; MG/1
1 TABLET ORAL 3 TIMES DAILY
Qty: 90 TABLET | Refills: 0 | Status: SHIPPED | OUTPATIENT
Start: 2018-12-11 | End: 2019-01-09 | Stop reason: SDUPTHER

## 2018-12-19 RX ORDER — MELOXICAM 7.5 MG/1
TABLET ORAL
Qty: 30 TABLET | Refills: 5 | Status: ON HOLD | OUTPATIENT
Start: 2018-12-19 | End: 2019-01-01 | Stop reason: HOSPADM

## 2019-01-01 ENCOUNTER — CARE COORDINATION (OUTPATIENT)
Dept: CASE MANAGEMENT | Age: 84
End: 2019-01-01

## 2019-01-01 ENCOUNTER — APPOINTMENT (OUTPATIENT)
Dept: GENERAL RADIOLOGY | Age: 84
DRG: 871 | End: 2019-01-01
Payer: MEDICARE

## 2019-01-01 ENCOUNTER — APPOINTMENT (OUTPATIENT)
Dept: GENERAL RADIOLOGY | Age: 84
DRG: 947 | End: 2019-01-01
Attending: PHYSICAL MEDICINE & REHABILITATION
Payer: MEDICARE

## 2019-01-01 ENCOUNTER — OFFICE VISIT (OUTPATIENT)
Dept: FAMILY MEDICINE CLINIC | Age: 84
End: 2019-01-01
Payer: MEDICARE

## 2019-01-01 ENCOUNTER — APPOINTMENT (OUTPATIENT)
Dept: ULTRASOUND IMAGING | Age: 84
DRG: 871 | End: 2019-01-01
Payer: MEDICARE

## 2019-01-01 ENCOUNTER — TELEPHONE (OUTPATIENT)
Dept: FAMILY MEDICINE CLINIC | Age: 84
End: 2019-01-01

## 2019-01-01 ENCOUNTER — NURSE TRIAGE (OUTPATIENT)
Dept: OTHER | Facility: CLINIC | Age: 84
End: 2019-01-01

## 2019-01-01 ENCOUNTER — CARE COORDINATION (OUTPATIENT)
Dept: CARE COORDINATION | Age: 84
End: 2019-01-01

## 2019-01-01 ENCOUNTER — APPOINTMENT (OUTPATIENT)
Dept: CT IMAGING | Age: 84
DRG: 871 | End: 2019-01-01
Payer: MEDICARE

## 2019-01-01 ENCOUNTER — HOSPITAL ENCOUNTER (INPATIENT)
Age: 84
LOS: 12 days | Discharge: NURSING, REHAB OR LONG-TERM CARE | DRG: 871 | End: 2019-08-15
Attending: EMERGENCY MEDICINE | Admitting: INTERNAL MEDICINE
Payer: MEDICARE

## 2019-01-01 ENCOUNTER — HOSPITAL ENCOUNTER (INPATIENT)
Age: 84
LOS: 14 days | Discharge: HOME HEALTH | DRG: 947 | End: 2019-08-29
Attending: PHYSICAL MEDICINE & REHABILITATION | Admitting: PHYSICAL MEDICINE & REHABILITATION
Payer: MEDICARE

## 2019-01-01 VITALS
BODY MASS INDEX: 46.17 KG/M2 | SYSTOLIC BLOOD PRESSURE: 133 MMHG | WEIGHT: 294.8 LBS | DIASTOLIC BLOOD PRESSURE: 67 MMHG | OXYGEN SATURATION: 88 % | HEART RATE: 83 BPM

## 2019-01-01 VITALS
HEART RATE: 94 BPM | OXYGEN SATURATION: 95 % | SYSTOLIC BLOOD PRESSURE: 159 MMHG | RESPIRATION RATE: 20 BRPM | HEIGHT: 67 IN | DIASTOLIC BLOOD PRESSURE: 65 MMHG | TEMPERATURE: 98.6 F | BODY MASS INDEX: 44.43 KG/M2 | WEIGHT: 283.1 LBS

## 2019-01-01 VITALS
WEIGHT: 285.27 LBS | TEMPERATURE: 98.1 F | DIASTOLIC BLOOD PRESSURE: 70 MMHG | RESPIRATION RATE: 18 BRPM | BODY MASS INDEX: 44.78 KG/M2 | HEIGHT: 67 IN | SYSTOLIC BLOOD PRESSURE: 150 MMHG | HEART RATE: 87 BPM | OXYGEN SATURATION: 97 %

## 2019-01-01 VITALS — OXYGEN SATURATION: 89 % | HEART RATE: 78 BPM | SYSTOLIC BLOOD PRESSURE: 158 MMHG | DIASTOLIC BLOOD PRESSURE: 92 MMHG

## 2019-01-01 VITALS
OXYGEN SATURATION: 90 % | WEIGHT: 287.4 LBS | SYSTOLIC BLOOD PRESSURE: 134 MMHG | DIASTOLIC BLOOD PRESSURE: 78 MMHG | BODY MASS INDEX: 45.01 KG/M2 | HEART RATE: 84 BPM

## 2019-01-01 DIAGNOSIS — M19.90 ARTHRITIS: ICD-10-CM

## 2019-01-01 DIAGNOSIS — J96.01 ACUTE RESPIRATORY FAILURE WITH HYPOXIA (HCC): Primary | ICD-10-CM

## 2019-01-01 DIAGNOSIS — I10 ESSENTIAL HYPERTENSION: ICD-10-CM

## 2019-01-01 DIAGNOSIS — J43.1 PANLOBULAR EMPHYSEMA (HCC): ICD-10-CM

## 2019-01-01 DIAGNOSIS — G47.33 OSA ON CPAP: ICD-10-CM

## 2019-01-01 DIAGNOSIS — Z86.711 HISTORY OF PULMONARY EMBOLUS (PE): ICD-10-CM

## 2019-01-01 DIAGNOSIS — Z99.89 OSA ON CPAP: ICD-10-CM

## 2019-01-01 DIAGNOSIS — E78.00 HYPERCHOLESTEREMIA: ICD-10-CM

## 2019-01-01 DIAGNOSIS — E11.9 TYPE 2 DIABETES MELLITUS WITHOUT COMPLICATION, WITHOUT LONG-TERM CURRENT USE OF INSULIN (HCC): ICD-10-CM

## 2019-01-01 DIAGNOSIS — Z23 NEED FOR IMMUNIZATION AGAINST INFLUENZA: ICD-10-CM

## 2019-01-01 DIAGNOSIS — Z72.0 SMOKELESS TOBACCO USE: ICD-10-CM

## 2019-01-01 DIAGNOSIS — J18.9 PNEUMONIA OF RIGHT LUNG DUE TO INFECTIOUS ORGANISM, UNSPECIFIED PART OF LUNG: ICD-10-CM

## 2019-01-01 DIAGNOSIS — Z91.81 AT HIGH RISK FOR FALLS: ICD-10-CM

## 2019-01-01 DIAGNOSIS — A41.9 SEVERE SEPSIS (HCC): ICD-10-CM

## 2019-01-01 DIAGNOSIS — I25.10 CORONARY ARTERY DISEASE INVOLVING NATIVE CORONARY ARTERY OF NATIVE HEART WITHOUT ANGINA PECTORIS: ICD-10-CM

## 2019-01-01 DIAGNOSIS — E11.9 TYPE 2 DIABETES MELLITUS WITHOUT COMPLICATION, WITHOUT LONG-TERM CURRENT USE OF INSULIN (HCC): Primary | ICD-10-CM

## 2019-01-01 DIAGNOSIS — R65.20 SEVERE SEPSIS (HCC): ICD-10-CM

## 2019-01-01 DIAGNOSIS — I25.10 CORONARY ARTERY DISEASE INVOLVING NATIVE CORONARY ARTERY OF NATIVE HEART WITHOUT ANGINA PECTORIS: Primary | ICD-10-CM

## 2019-01-01 LAB
A/G RATIO: 1.1 (ref 1.1–2.2)
A/G RATIO: 1.2 (ref 1.1–2.2)
A/G RATIO: 1.9 (ref 1.1–2.2)
AFB CULTURE (MYCOBACTERIA): NORMAL
AFB SMEAR: NORMAL
ALBUMIN SERPL-MCNC: 3 G/DL (ref 3.4–5)
ALBUMIN SERPL-MCNC: 3 G/DL (ref 3.4–5)
ALBUMIN SERPL-MCNC: 3.1 G/DL (ref 3.4–5)
ALBUMIN SERPL-MCNC: 3.2 G/DL (ref 3.4–5)
ALBUMIN SERPL-MCNC: 3.3 G/DL (ref 3.4–5)
ALBUMIN SERPL-MCNC: 3.6 G/DL (ref 3.4–5)
ALBUMIN SERPL-MCNC: 4.1 G/DL (ref 3.4–5)
ALBUMIN SERPL-MCNC: 4.2 G/DL (ref 3.4–5)
ALP BLD-CCNC: 46 U/L (ref 40–129)
ALP BLD-CCNC: 47 U/L (ref 40–129)
ALP BLD-CCNC: 52 U/L (ref 40–129)
ALT SERPL-CCNC: 21 U/L (ref 10–40)
ALT SERPL-CCNC: 21 U/L (ref 10–40)
ALT SERPL-CCNC: 24 U/L (ref 10–40)
ANION GAP SERPL CALCULATED.3IONS-SCNC: 10 MMOL/L (ref 3–16)
ANION GAP SERPL CALCULATED.3IONS-SCNC: 11 MMOL/L (ref 3–16)
ANION GAP SERPL CALCULATED.3IONS-SCNC: 12 MMOL/L (ref 3–16)
ANION GAP SERPL CALCULATED.3IONS-SCNC: 13 MMOL/L (ref 3–16)
ANION GAP SERPL CALCULATED.3IONS-SCNC: 14 MMOL/L (ref 3–16)
ANION GAP SERPL CALCULATED.3IONS-SCNC: 7 MMOL/L (ref 3–16)
ANION GAP SERPL CALCULATED.3IONS-SCNC: 8 MMOL/L (ref 3–16)
ANION GAP SERPL CALCULATED.3IONS-SCNC: 8 MMOL/L (ref 3–16)
ANION GAP SERPL CALCULATED.3IONS-SCNC: 9 MMOL/L (ref 3–16)
APPEARANCE FLUID: NORMAL
APTT: 29.3 SEC (ref 26–36)
APTT: 30.9 SEC (ref 26–36)
APTT: 43.4 SEC (ref 26–36)
APTT: 44.1 SEC (ref 26–36)
AST SERPL-CCNC: 14 U/L (ref 15–37)
AST SERPL-CCNC: 17 U/L (ref 15–37)
AST SERPL-CCNC: 18 U/L (ref 15–37)
BACTERIA: ABNORMAL /HPF
BACTERIA: ABNORMAL /HPF
BASE EXCESS ARTERIAL: 3.7 MMOL/L (ref -3–3)
BASE EXCESS VENOUS: 1.1 MMOL/L (ref -3–3)
BASOPHILS ABSOLUTE: 0 K/UL (ref 0–0.2)
BASOPHILS ABSOLUTE: 0 K/UL (ref 0–0.2)
BASOPHILS ABSOLUTE: 0.1 K/UL (ref 0–0.2)
BASOPHILS RELATIVE PERCENT: 0.3 %
BASOPHILS RELATIVE PERCENT: 0.3 %
BASOPHILS RELATIVE PERCENT: 0.7 %
BASOPHILS RELATIVE PERCENT: 0.9 %
BASOPHILS RELATIVE PERCENT: 1 %
BASOPHILS RELATIVE PERCENT: 1 %
BILIRUB SERPL-MCNC: 0.3 MG/DL (ref 0–1)
BILIRUB SERPL-MCNC: 0.3 MG/DL (ref 0–1)
BILIRUB SERPL-MCNC: 0.4 MG/DL (ref 0–1)
BILIRUBIN URINE: NEGATIVE
BILIRUBIN URINE: NEGATIVE
BLOOD CULTURE, ROUTINE: NORMAL
BLOOD, URINE: ABNORMAL
BLOOD, URINE: ABNORMAL
BODY FLUID CULTURE, STERILE: NORMAL
BUN BLDV-MCNC: 12 MG/DL (ref 7–20)
BUN BLDV-MCNC: 13 MG/DL (ref 7–20)
BUN BLDV-MCNC: 13 MG/DL (ref 7–20)
BUN BLDV-MCNC: 14 MG/DL (ref 7–20)
BUN BLDV-MCNC: 15 MG/DL (ref 7–20)
BUN BLDV-MCNC: 16 MG/DL (ref 7–20)
BUN BLDV-MCNC: 16 MG/DL (ref 7–20)
BUN BLDV-MCNC: 17 MG/DL (ref 7–20)
BUN BLDV-MCNC: 17 MG/DL (ref 7–20)
BUN BLDV-MCNC: 18 MG/DL (ref 7–20)
BUN BLDV-MCNC: 18 MG/DL (ref 7–20)
BUN BLDV-MCNC: 19 MG/DL (ref 7–20)
BUN BLDV-MCNC: 20 MG/DL (ref 7–20)
C-REACTIVE PROTEIN: 157.4 MG/L (ref 0–5.1)
CALCIUM SERPL-MCNC: 8.7 MG/DL (ref 8.3–10.6)
CALCIUM SERPL-MCNC: 9.1 MG/DL (ref 8.3–10.6)
CALCIUM SERPL-MCNC: 9.1 MG/DL (ref 8.3–10.6)
CALCIUM SERPL-MCNC: 9.2 MG/DL (ref 8.3–10.6)
CALCIUM SERPL-MCNC: 9.3 MG/DL (ref 8.3–10.6)
CALCIUM SERPL-MCNC: 9.4 MG/DL (ref 8.3–10.6)
CALCIUM SERPL-MCNC: 9.5 MG/DL (ref 8.3–10.6)
CALCIUM SERPL-MCNC: 9.6 MG/DL (ref 8.3–10.6)
CALCIUM SERPL-MCNC: 9.7 MG/DL (ref 8.3–10.6)
CALCIUM SERPL-MCNC: 9.7 MG/DL (ref 8.3–10.6)
CARBOXYHEMOGLOBIN ARTERIAL: 0.6 % (ref 0–1.5)
CARBOXYHEMOGLOBIN: 1.2 % (ref 0–1.5)
CELL COUNT FLUID TYPE: NORMAL
CHLORIDE BLD-SCNC: 101 MMOL/L (ref 99–110)
CHLORIDE BLD-SCNC: 90 MMOL/L (ref 99–110)
CHLORIDE BLD-SCNC: 91 MMOL/L (ref 99–110)
CHLORIDE BLD-SCNC: 92 MMOL/L (ref 99–110)
CHLORIDE BLD-SCNC: 92 MMOL/L (ref 99–110)
CHLORIDE BLD-SCNC: 93 MMOL/L (ref 99–110)
CHLORIDE BLD-SCNC: 94 MMOL/L (ref 99–110)
CHLORIDE BLD-SCNC: 94 MMOL/L (ref 99–110)
CHLORIDE BLD-SCNC: 95 MMOL/L (ref 99–110)
CHLORIDE BLD-SCNC: 96 MMOL/L (ref 99–110)
CHLORIDE BLD-SCNC: 96 MMOL/L (ref 99–110)
CHLORIDE BLD-SCNC: 97 MMOL/L (ref 99–110)
CHLORIDE BLD-SCNC: 97 MMOL/L (ref 99–110)
CHLORIDE BLD-SCNC: 98 MMOL/L (ref 99–110)
CHLORIDE BLD-SCNC: 99 MMOL/L (ref 99–110)
CLARITY: ABNORMAL
CLARITY: CLEAR
CLOT EVALUATION: NORMAL
CO2: 29 MMOL/L (ref 21–32)
CO2: 30 MMOL/L (ref 21–32)
CO2: 31 MMOL/L (ref 21–32)
CO2: 33 MMOL/L (ref 21–32)
CO2: 34 MMOL/L (ref 21–32)
CO2: 35 MMOL/L (ref 21–32)
CO2: 36 MMOL/L (ref 21–32)
CO2: 37 MMOL/L (ref 21–32)
CO2: 37 MMOL/L (ref 21–32)
CO2: 38 MMOL/L (ref 21–32)
CO2: 38 MMOL/L (ref 21–32)
COLOR FLUID: YELLOW
COLOR: ABNORMAL
COLOR: YELLOW
CREAT SERPL-MCNC: 0.6 MG/DL (ref 0.8–1.3)
CREAT SERPL-MCNC: <0.5 MG/DL (ref 0.8–1.3)
CREATININE URINE POCT: 50
CULTURE, BLOOD 2: NORMAL
CULTURE, RESPIRATORY: NORMAL
EKG ATRIAL RATE: 119 BPM
EKG DIAGNOSIS: NORMAL
EKG P AXIS: 23 DEGREES
EKG P-R INTERVAL: 188 MS
EKG Q-T INTERVAL: 288 MS
EKG QRS DURATION: 64 MS
EKG QTC CALCULATION (BAZETT): 405 MS
EKG R AXIS: -14 DEGREES
EKG T AXIS: 40 DEGREES
EKG VENTRICULAR RATE: 119 BPM
EOSINOPHIL FLUID: 2 %
EOSINOPHILS ABSOLUTE: 0 K/UL (ref 0–0.6)
EOSINOPHILS ABSOLUTE: 0 K/UL (ref 0–0.6)
EOSINOPHILS ABSOLUTE: 0.2 K/UL (ref 0–0.6)
EOSINOPHILS RELATIVE PERCENT: 0 %
EOSINOPHILS RELATIVE PERCENT: 0.1 %
EOSINOPHILS RELATIVE PERCENT: 2.1 %
EOSINOPHILS RELATIVE PERCENT: 2.2 %
EOSINOPHILS RELATIVE PERCENT: 2.3 %
EOSINOPHILS RELATIVE PERCENT: 2.6 %
EPITHELIAL CELLS, UA: ABNORMAL /HPF
EPITHELIAL CELLS, UA: ABNORMAL /HPF
ESTIMATED AVERAGE GLUCOSE: 168.6 MG/DL
FIBRINOGEN: 469 MG/DL (ref 200–397)
FIBRINOGEN: 480 MG/DL (ref 200–397)
FIBRINOGEN: 491 MG/DL (ref 200–397)
FIBRINOGEN: 503 MG/DL (ref 200–397)
FIBRINOGEN: 515 MG/DL (ref 200–397)
FIBRINOGEN: 528 MG/DL (ref 200–397)
FIBRINOGEN: 541 MG/DL (ref 200–397)
FIBRINOGEN: 570 MG/DL (ref 200–397)
FIBRINOGEN: 586 MG/DL (ref 200–397)
FIBRINOGEN: 586 MG/DL (ref 200–397)
FIBRINOGEN: 602 MG/DL (ref 200–397)
FIBRINOGEN: 620 MG/DL (ref 200–397)
FIBRINOGEN: 620 MG/DL (ref 200–397)
FIBRINOGEN: 638 MG/DL (ref 200–397)
FIBRINOGEN: 657 MG/DL (ref 200–397)
FIBRINOGEN: 699 MG/DL (ref 200–397)
FIBRINOGEN: 722 MG/DL (ref 200–397)
FIBRINOGEN: 722 MG/DL (ref 200–397)
FIBRINOGEN: 773 MG/DL (ref 200–397)
FLUID PATH CONSULT: YES
FLUID TYPE: NORMAL
FUNGUS (MYCOLOGY) CULTURE: NORMAL
FUNGUS STAIN: NORMAL
GFR AFRICAN AMERICAN: >60
GFR NON-AFRICAN AMERICAN: >60
GLOBULIN: 2.2 G/DL
GLOBULIN: 3.3 G/DL
GLOBULIN: 3.5 G/DL
GLUCOSE BLD-MCNC: 109 MG/DL (ref 70–99)
GLUCOSE BLD-MCNC: 114 MG/DL (ref 70–99)
GLUCOSE BLD-MCNC: 114 MG/DL (ref 70–99)
GLUCOSE BLD-MCNC: 116 MG/DL (ref 70–99)
GLUCOSE BLD-MCNC: 117 MG/DL (ref 70–99)
GLUCOSE BLD-MCNC: 121 MG/DL (ref 70–99)
GLUCOSE BLD-MCNC: 122 MG/DL (ref 70–99)
GLUCOSE BLD-MCNC: 122 MG/DL (ref 70–99)
GLUCOSE BLD-MCNC: 123 MG/DL (ref 70–99)
GLUCOSE BLD-MCNC: 124 MG/DL (ref 70–99)
GLUCOSE BLD-MCNC: 124 MG/DL (ref 70–99)
GLUCOSE BLD-MCNC: 125 MG/DL (ref 70–99)
GLUCOSE BLD-MCNC: 126 MG/DL (ref 70–99)
GLUCOSE BLD-MCNC: 126 MG/DL (ref 70–99)
GLUCOSE BLD-MCNC: 128 MG/DL (ref 70–99)
GLUCOSE BLD-MCNC: 128 MG/DL (ref 70–99)
GLUCOSE BLD-MCNC: 129 MG/DL (ref 70–99)
GLUCOSE BLD-MCNC: 130 MG/DL (ref 70–99)
GLUCOSE BLD-MCNC: 130 MG/DL (ref 70–99)
GLUCOSE BLD-MCNC: 131 MG/DL (ref 70–99)
GLUCOSE BLD-MCNC: 133 MG/DL (ref 70–99)
GLUCOSE BLD-MCNC: 134 MG/DL (ref 70–99)
GLUCOSE BLD-MCNC: 134 MG/DL (ref 70–99)
GLUCOSE BLD-MCNC: 135 MG/DL (ref 70–99)
GLUCOSE BLD-MCNC: 136 MG/DL (ref 70–99)
GLUCOSE BLD-MCNC: 139 MG/DL (ref 70–99)
GLUCOSE BLD-MCNC: 140 MG/DL (ref 70–99)
GLUCOSE BLD-MCNC: 141 MG/DL (ref 70–99)
GLUCOSE BLD-MCNC: 142 MG/DL (ref 70–99)
GLUCOSE BLD-MCNC: 142 MG/DL (ref 70–99)
GLUCOSE BLD-MCNC: 143 MG/DL (ref 70–99)
GLUCOSE BLD-MCNC: 143 MG/DL (ref 70–99)
GLUCOSE BLD-MCNC: 144 MG/DL (ref 70–99)
GLUCOSE BLD-MCNC: 145 MG/DL (ref 70–99)
GLUCOSE BLD-MCNC: 145 MG/DL (ref 70–99)
GLUCOSE BLD-MCNC: 146 MG/DL (ref 70–99)
GLUCOSE BLD-MCNC: 147 MG/DL (ref 70–99)
GLUCOSE BLD-MCNC: 148 MG/DL (ref 70–99)
GLUCOSE BLD-MCNC: 148 MG/DL (ref 70–99)
GLUCOSE BLD-MCNC: 149 MG/DL (ref 70–99)
GLUCOSE BLD-MCNC: 150 MG/DL (ref 70–99)
GLUCOSE BLD-MCNC: 151 MG/DL (ref 70–99)
GLUCOSE BLD-MCNC: 151 MG/DL (ref 70–99)
GLUCOSE BLD-MCNC: 152 MG/DL (ref 70–99)
GLUCOSE BLD-MCNC: 153 MG/DL (ref 70–99)
GLUCOSE BLD-MCNC: 153 MG/DL (ref 70–99)
GLUCOSE BLD-MCNC: 154 MG/DL (ref 70–99)
GLUCOSE BLD-MCNC: 155 MG/DL (ref 70–99)
GLUCOSE BLD-MCNC: 156 MG/DL (ref 70–99)
GLUCOSE BLD-MCNC: 157 MG/DL (ref 70–99)
GLUCOSE BLD-MCNC: 157 MG/DL (ref 70–99)
GLUCOSE BLD-MCNC: 158 MG/DL (ref 70–99)
GLUCOSE BLD-MCNC: 159 MG/DL (ref 70–99)
GLUCOSE BLD-MCNC: 159 MG/DL (ref 70–99)
GLUCOSE BLD-MCNC: 160 MG/DL (ref 70–99)
GLUCOSE BLD-MCNC: 160 MG/DL (ref 70–99)
GLUCOSE BLD-MCNC: 161 MG/DL (ref 70–99)
GLUCOSE BLD-MCNC: 163 MG/DL (ref 70–99)
GLUCOSE BLD-MCNC: 167 MG/DL (ref 70–99)
GLUCOSE BLD-MCNC: 167 MG/DL (ref 70–99)
GLUCOSE BLD-MCNC: 168 MG/DL (ref 70–99)
GLUCOSE BLD-MCNC: 168 MG/DL (ref 70–99)
GLUCOSE BLD-MCNC: 169 MG/DL (ref 70–99)
GLUCOSE BLD-MCNC: 170 MG/DL (ref 70–99)
GLUCOSE BLD-MCNC: 170 MG/DL (ref 70–99)
GLUCOSE BLD-MCNC: 171 MG/DL (ref 70–99)
GLUCOSE BLD-MCNC: 171 MG/DL (ref 70–99)
GLUCOSE BLD-MCNC: 172 MG/DL (ref 70–99)
GLUCOSE BLD-MCNC: 173 MG/DL (ref 70–99)
GLUCOSE BLD-MCNC: 174 MG/DL (ref 70–99)
GLUCOSE BLD-MCNC: 176 MG/DL (ref 70–99)
GLUCOSE BLD-MCNC: 176 MG/DL (ref 70–99)
GLUCOSE BLD-MCNC: 178 MG/DL (ref 70–99)
GLUCOSE BLD-MCNC: 178 MG/DL (ref 70–99)
GLUCOSE BLD-MCNC: 179 MG/DL (ref 70–99)
GLUCOSE BLD-MCNC: 180 MG/DL (ref 70–99)
GLUCOSE BLD-MCNC: 181 MG/DL (ref 70–99)
GLUCOSE BLD-MCNC: 185 MG/DL (ref 70–99)
GLUCOSE BLD-MCNC: 188 MG/DL (ref 70–99)
GLUCOSE BLD-MCNC: 188 MG/DL (ref 70–99)
GLUCOSE BLD-MCNC: 189 MG/DL (ref 70–99)
GLUCOSE BLD-MCNC: 191 MG/DL (ref 70–99)
GLUCOSE BLD-MCNC: 193 MG/DL (ref 70–99)
GLUCOSE BLD-MCNC: 194 MG/DL (ref 70–99)
GLUCOSE BLD-MCNC: 217 MG/DL (ref 70–99)
GLUCOSE BLD-MCNC: 250 MG/DL (ref 70–99)
GLUCOSE URINE: NEGATIVE MG/DL
GLUCOSE URINE: NEGATIVE MG/DL
GRAM STAIN RESULT: NORMAL
GRAM STAIN RESULT: NORMAL
HBA1C MFR BLD: 6.8 %
HBA1C MFR BLD: 7.5 %
HBA1C MFR BLD: 7.6 %
HBA1C MFR BLD: 7.7 %
HCO3 ARTERIAL: 28.9 MMOL/L (ref 21–29)
HCO3 VENOUS: 27.5 MMOL/L (ref 23–29)
HCT VFR BLD CALC: 37.2 % (ref 40.5–52.5)
HCT VFR BLD CALC: 37.3 % (ref 40.5–52.5)
HCT VFR BLD CALC: 37.9 % (ref 40.5–52.5)
HCT VFR BLD CALC: 37.9 % (ref 40.5–52.5)
HCT VFR BLD CALC: 38.5 % (ref 40.5–52.5)
HCT VFR BLD CALC: 38.8 % (ref 40.5–52.5)
HCT VFR BLD CALC: 38.9 % (ref 40.5–52.5)
HCT VFR BLD CALC: 39 % (ref 40.5–52.5)
HCT VFR BLD CALC: 39.1 % (ref 40.5–52.5)
HCT VFR BLD CALC: 39.2 % (ref 40.5–52.5)
HCT VFR BLD CALC: 39.3 % (ref 40.5–52.5)
HCT VFR BLD CALC: 39.7 % (ref 40.5–52.5)
HCT VFR BLD CALC: 39.8 % (ref 40.5–52.5)
HCT VFR BLD CALC: 40 % (ref 40.5–52.5)
HCT VFR BLD CALC: 40.2 % (ref 40.5–52.5)
HCT VFR BLD CALC: 40.9 % (ref 40.5–52.5)
HCT VFR BLD CALC: 41 % (ref 40.5–52.5)
HCT VFR BLD CALC: 41.4 % (ref 40.5–52.5)
HCT VFR BLD CALC: 41.5 % (ref 40.5–52.5)
HCT VFR BLD CALC: 41.9 % (ref 40.5–52.5)
HCT VFR BLD CALC: 41.9 % (ref 40.5–52.5)
HCT VFR BLD CALC: 47.1 % (ref 40.5–52.5)
HEMOGLOBIN, ART, EXTENDED: 14.6 G/DL (ref 13.5–17.5)
HEMOGLOBIN: 12.3 G/DL (ref 13.5–17.5)
HEMOGLOBIN: 12.4 G/DL (ref 13.5–17.5)
HEMOGLOBIN: 12.6 G/DL (ref 13.5–17.5)
HEMOGLOBIN: 12.7 G/DL (ref 13.5–17.5)
HEMOGLOBIN: 12.8 G/DL (ref 13.5–17.5)
HEMOGLOBIN: 12.9 G/DL (ref 13.5–17.5)
HEMOGLOBIN: 12.9 G/DL (ref 13.5–17.5)
HEMOGLOBIN: 13 G/DL (ref 13.5–17.5)
HEMOGLOBIN: 13.1 G/DL (ref 13.5–17.5)
HEMOGLOBIN: 13.3 G/DL (ref 13.5–17.5)
HEMOGLOBIN: 13.5 G/DL (ref 13.5–17.5)
HEMOGLOBIN: 13.5 G/DL (ref 13.5–17.5)
HEMOGLOBIN: 13.6 G/DL (ref 13.5–17.5)
HEMOGLOBIN: 13.7 G/DL (ref 13.5–17.5)
HEMOGLOBIN: 13.8 G/DL (ref 13.5–17.5)
HEMOGLOBIN: 13.9 G/DL (ref 13.5–17.5)
HEMOGLOBIN: 14 G/DL (ref 13.5–17.5)
HEMOGLOBIN: 15.6 G/DL (ref 13.5–17.5)
INR BLD: 1.71 (ref 0.86–1.14)
KETONES, URINE: NEGATIVE MG/DL
KETONES, URINE: NEGATIVE MG/DL
L. PNEUMOPHILA SEROGP 1 UR AG: NORMAL
LACTIC ACID: 1.5 MMOL/L (ref 0.4–2)
LACTIC ACID: 2.2 MMOL/L (ref 0.4–2)
LACTIC ACID: 2.2 MMOL/L (ref 0.4–2)
LACTIC ACID: 2.9 MMOL/L (ref 0.4–2)
LD, FLUID: 103 U/L
LEUKOCYTE ESTERASE, URINE: ABNORMAL
LEUKOCYTE ESTERASE, URINE: NEGATIVE
LV EF: 60 %
LVEF MODALITY: NORMAL
LYMPHOCYTES ABSOLUTE: 0.5 K/UL (ref 1–5.1)
LYMPHOCYTES ABSOLUTE: 0.9 K/UL (ref 1–5.1)
LYMPHOCYTES ABSOLUTE: 1 K/UL (ref 1–5.1)
LYMPHOCYTES ABSOLUTE: 1.1 K/UL (ref 1–5.1)
LYMPHOCYTES ABSOLUTE: 1.1 K/UL (ref 1–5.1)
LYMPHOCYTES ABSOLUTE: 1.2 K/UL (ref 1–5.1)
LYMPHOCYTES RELATIVE PERCENT: 10.8 %
LYMPHOCYTES RELATIVE PERCENT: 12.8 %
LYMPHOCYTES RELATIVE PERCENT: 13.9 %
LYMPHOCYTES RELATIVE PERCENT: 14.5 %
LYMPHOCYTES RELATIVE PERCENT: 4.5 %
LYMPHOCYTES RELATIVE PERCENT: 8.3 %
LYMPHOCYTES, BODY FLUID: 62 %
MACROPHAGE FLUID: 4 %
MAGNESIUM: 1.7 MG/DL (ref 1.8–2.4)
MAGNESIUM: 1.8 MG/DL (ref 1.8–2.4)
MAGNESIUM: 1.9 MG/DL (ref 1.8–2.4)
MAGNESIUM: 1.9 MG/DL (ref 1.8–2.4)
MAGNESIUM: 2 MG/DL (ref 1.8–2.4)
MCH RBC QN AUTO: 30.3 PG (ref 26–34)
MCH RBC QN AUTO: 30.6 PG (ref 26–34)
MCH RBC QN AUTO: 30.6 PG (ref 26–34)
MCH RBC QN AUTO: 30.7 PG (ref 26–34)
MCH RBC QN AUTO: 30.8 PG (ref 26–34)
MCH RBC QN AUTO: 30.9 PG (ref 26–34)
MCH RBC QN AUTO: 31 PG (ref 26–34)
MCH RBC QN AUTO: 31.1 PG (ref 26–34)
MCH RBC QN AUTO: 31.2 PG (ref 26–34)
MCH RBC QN AUTO: 31.4 PG (ref 26–34)
MCH RBC QN AUTO: 31.5 PG (ref 26–34)
MCH RBC QN AUTO: 31.5 PG (ref 26–34)
MCHC RBC AUTO-ENTMCNC: 32.3 G/DL (ref 31–36)
MCHC RBC AUTO-ENTMCNC: 32.4 G/DL (ref 31–36)
MCHC RBC AUTO-ENTMCNC: 32.7 G/DL (ref 31–36)
MCHC RBC AUTO-ENTMCNC: 32.8 G/DL (ref 31–36)
MCHC RBC AUTO-ENTMCNC: 32.9 G/DL (ref 31–36)
MCHC RBC AUTO-ENTMCNC: 32.9 G/DL (ref 31–36)
MCHC RBC AUTO-ENTMCNC: 33 G/DL (ref 31–36)
MCHC RBC AUTO-ENTMCNC: 33.1 G/DL (ref 31–36)
MCHC RBC AUTO-ENTMCNC: 33.2 G/DL (ref 31–36)
MCHC RBC AUTO-ENTMCNC: 33.4 G/DL (ref 31–36)
MCHC RBC AUTO-ENTMCNC: 33.5 G/DL (ref 31–36)
MCHC RBC AUTO-ENTMCNC: 33.7 G/DL (ref 31–36)
MCHC RBC AUTO-ENTMCNC: 33.7 G/DL (ref 31–36)
MCV RBC AUTO: 92.6 FL (ref 80–100)
MCV RBC AUTO: 92.7 FL (ref 80–100)
MCV RBC AUTO: 92.8 FL (ref 80–100)
MCV RBC AUTO: 92.9 FL (ref 80–100)
MCV RBC AUTO: 93.1 FL (ref 80–100)
MCV RBC AUTO: 93.2 FL (ref 80–100)
MCV RBC AUTO: 93.3 FL (ref 80–100)
MCV RBC AUTO: 93.4 FL (ref 80–100)
MCV RBC AUTO: 93.5 FL (ref 80–100)
MCV RBC AUTO: 93.6 FL (ref 80–100)
MCV RBC AUTO: 93.6 FL (ref 80–100)
MCV RBC AUTO: 93.7 FL (ref 80–100)
MCV RBC AUTO: 94 FL (ref 80–100)
MCV RBC AUTO: 94.1 FL (ref 80–100)
MCV RBC AUTO: 94.2 FL (ref 80–100)
MCV RBC AUTO: 94.2 FL (ref 80–100)
MCV RBC AUTO: 94.3 FL (ref 80–100)
MCV RBC AUTO: 94.4 FL (ref 80–100)
MCV RBC AUTO: 94.6 FL (ref 80–100)
MCV RBC AUTO: 94.6 FL (ref 80–100)
MCV RBC AUTO: 94.7 FL (ref 80–100)
MCV RBC AUTO: 94.7 FL (ref 80–100)
MCV RBC AUTO: 94.8 FL (ref 80–100)
METHEMOGLOBIN ARTERIAL: 0.5 %
METHEMOGLOBIN VENOUS: 0.4 %
MICROALBUMIN/CREAT 24H UR: 30 MG/G{CREAT}
MICROALBUMIN/CREAT UR-RTO: ABNORMAL
MICROSCOPIC EXAMINATION: YES
MICROSCOPIC EXAMINATION: YES
MONOCYTE, FLUID: 10 %
MONOCYTES ABSOLUTE: 0.7 K/UL (ref 0–1.3)
MONOCYTES ABSOLUTE: 0.7 K/UL (ref 0–1.3)
MONOCYTES ABSOLUTE: 0.8 K/UL (ref 0–1.3)
MONOCYTES ABSOLUTE: 0.8 K/UL (ref 0–1.3)
MONOCYTES ABSOLUTE: 1.1 K/UL (ref 0–1.3)
MONOCYTES ABSOLUTE: 1.4 K/UL (ref 0–1.3)
MONOCYTES RELATIVE PERCENT: 11.5 %
MONOCYTES RELATIVE PERCENT: 8.8 %
MONOCYTES RELATIVE PERCENT: 8.9 %
MONOCYTES RELATIVE PERCENT: 9 %
MONOCYTES RELATIVE PERCENT: 9.1 %
MONOCYTES RELATIVE PERCENT: 9.9 %
MONONUCLEAR UNIDENTIFIED CELLS FLUID: 1 %
NEUTROPHIL, FLUID: 21 %
NEUTROPHILS ABSOLUTE: 10.5 K/UL (ref 1.7–7.7)
NEUTROPHILS ABSOLUTE: 5.7 K/UL (ref 1.7–7.7)
NEUTROPHILS ABSOLUTE: 6.1 K/UL (ref 1.7–7.7)
NEUTROPHILS ABSOLUTE: 6.2 K/UL (ref 1.7–7.7)
NEUTROPHILS ABSOLUTE: 6.4 K/UL (ref 1.7–7.7)
NEUTROPHILS ABSOLUTE: 9.3 K/UL (ref 1.7–7.7)
NEUTROPHILS RELATIVE PERCENT: 72.3 %
NEUTROPHILS RELATIVE PERCENT: 74.2 %
NEUTROPHILS RELATIVE PERCENT: 74.5 %
NEUTROPHILS RELATIVE PERCENT: 77.5 %
NEUTROPHILS RELATIVE PERCENT: 79.8 %
NEUTROPHILS RELATIVE PERCENT: 86.2 %
NITRITE, URINE: NEGATIVE
NITRITE, URINE: NEGATIVE
NUCLEATED CELLS FLUID: 1303 /CUMM
NUMBER OF CELLS COUNTED FLUID: 100
O2 CONTENT ARTERIAL: 19 ML/DL
O2 CONTENT, VEN: 20 VOL %
O2 SAT, ARTERIAL: 93.7 %
O2 SAT, VEN: 89 %
O2 THERAPY: ABNORMAL
O2 THERAPY: ABNORMAL
PATH CONSULT FLUID: NORMAL
PCO2 ARTERIAL: 45.4 MMHG (ref 35–45)
PCO2, VEN: 49.8 MMHG (ref 40–50)
PDW BLD-RTO: 13.7 % (ref 12.4–15.4)
PDW BLD-RTO: 13.8 % (ref 12.4–15.4)
PDW BLD-RTO: 13.9 % (ref 12.4–15.4)
PDW BLD-RTO: 14 % (ref 12.4–15.4)
PDW BLD-RTO: 14.1 % (ref 12.4–15.4)
PDW BLD-RTO: 14.2 % (ref 12.4–15.4)
PDW BLD-RTO: 14.3 % (ref 12.4–15.4)
PDW BLD-RTO: 14.4 % (ref 12.4–15.4)
PDW BLD-RTO: 14.5 % (ref 12.4–15.4)
PERFORMED ON: ABNORMAL
PH ARTERIAL: 7.42 (ref 7.35–7.45)
PH UA: 5.5 (ref 5–8)
PH UA: 6 (ref 5–8)
PH VENOUS: 7.36 (ref 7.35–7.45)
PHOSPHORUS: 2.9 MG/DL (ref 2.5–4.9)
PHOSPHORUS: 3.2 MG/DL (ref 2.5–4.9)
PHOSPHORUS: 3.7 MG/DL (ref 2.5–4.9)
PHOSPHORUS: 3.8 MG/DL (ref 2.5–4.9)
PHOSPHORUS: 3.9 MG/DL (ref 2.5–4.9)
PHOSPHORUS: 3.9 MG/DL (ref 2.5–4.9)
PHOSPHORUS: 4.1 MG/DL (ref 2.5–4.9)
PLATELET # BLD: 208 K/UL (ref 135–450)
PLATELET # BLD: 209 K/UL (ref 135–450)
PLATELET # BLD: 211 K/UL (ref 135–450)
PLATELET # BLD: 217 K/UL (ref 135–450)
PLATELET # BLD: 219 K/UL (ref 135–450)
PLATELET # BLD: 220 K/UL (ref 135–450)
PLATELET # BLD: 228 K/UL (ref 135–450)
PLATELET # BLD: 236 K/UL (ref 135–450)
PLATELET # BLD: 239 K/UL (ref 135–450)
PLATELET # BLD: 242 K/UL (ref 135–450)
PLATELET # BLD: 244 K/UL (ref 135–450)
PLATELET # BLD: 248 K/UL (ref 135–450)
PLATELET # BLD: 261 K/UL (ref 135–450)
PLATELET # BLD: 263 K/UL (ref 135–450)
PLATELET # BLD: 265 K/UL (ref 135–450)
PLATELET # BLD: 272 K/UL (ref 135–450)
PLATELET # BLD: 274 K/UL (ref 135–450)
PLATELET # BLD: 289 K/UL (ref 135–450)
PLATELET # BLD: 298 K/UL (ref 135–450)
PLATELET # BLD: 310 K/UL (ref 135–450)
PLATELET # BLD: 314 K/UL (ref 135–450)
PLATELET # BLD: 320 K/UL (ref 135–450)
PLATELET # BLD: 329 K/UL (ref 135–450)
PLATELET # BLD: 341 K/UL (ref 135–450)
PLATELET # BLD: 353 K/UL (ref 135–450)
PLATELET # BLD: 364 K/UL (ref 135–450)
PLATELET # BLD: 376 K/UL (ref 135–450)
PLATELET # BLD: 408 K/UL (ref 135–450)
PMV BLD AUTO: 7.1 FL (ref 5–10.5)
PMV BLD AUTO: 7.2 FL (ref 5–10.5)
PMV BLD AUTO: 7.3 FL (ref 5–10.5)
PMV BLD AUTO: 7.3 FL (ref 5–10.5)
PMV BLD AUTO: 7.4 FL (ref 5–10.5)
PMV BLD AUTO: 7.5 FL (ref 5–10.5)
PMV BLD AUTO: 7.6 FL (ref 5–10.5)
PMV BLD AUTO: 7.6 FL (ref 5–10.5)
PMV BLD AUTO: 7.7 FL (ref 5–10.5)
PMV BLD AUTO: 7.7 FL (ref 5–10.5)
PMV BLD AUTO: 7.8 FL (ref 5–10.5)
PMV BLD AUTO: 8.1 FL (ref 5–10.5)
PMV BLD AUTO: 8.1 FL (ref 5–10.5)
PMV BLD AUTO: 8.2 FL (ref 5–10.5)
PO2 ARTERIAL: 66.2 MMHG (ref 75–108)
PO2, VEN: 58.4 MMHG (ref 25–40)
POTASSIUM REFLEX MAGNESIUM: 3.7 MMOL/L (ref 3.5–5.1)
POTASSIUM REFLEX MAGNESIUM: 3.7 MMOL/L (ref 3.5–5.1)
POTASSIUM REFLEX MAGNESIUM: 3.9 MMOL/L (ref 3.5–5.1)
POTASSIUM REFLEX MAGNESIUM: 3.9 MMOL/L (ref 3.5–5.1)
POTASSIUM REFLEX MAGNESIUM: 4 MMOL/L (ref 3.5–5.1)
POTASSIUM REFLEX MAGNESIUM: 4.1 MMOL/L (ref 3.5–5.1)
POTASSIUM SERPL-SCNC: 3.3 MMOL/L (ref 3.5–5.1)
POTASSIUM SERPL-SCNC: 3.5 MMOL/L (ref 3.5–5.1)
POTASSIUM SERPL-SCNC: 3.6 MMOL/L (ref 3.5–5.1)
POTASSIUM SERPL-SCNC: 3.7 MMOL/L (ref 3.5–5.1)
POTASSIUM SERPL-SCNC: 3.7 MMOL/L (ref 3.5–5.1)
POTASSIUM SERPL-SCNC: 3.9 MMOL/L (ref 3.5–5.1)
POTASSIUM SERPL-SCNC: 4 MMOL/L (ref 3.5–5.1)
POTASSIUM SERPL-SCNC: 4.1 MMOL/L (ref 3.5–5.1)
POTASSIUM SERPL-SCNC: 4.6 MMOL/L (ref 3.5–5.1)
POTASSIUM SERPL-SCNC: 4.9 MMOL/L (ref 3.5–5.1)
POTASSIUM SERPL-SCNC: 5.2 MMOL/L (ref 3.5–5.1)
PRO-BNP: 2377 PG/ML (ref 0–449)
PROTEIN FLUID: 3.2 G/DL
PROTEIN UA: ABNORMAL MG/DL
PROTEIN UA: ABNORMAL MG/DL
PROTHROMBIN TIME: 19.5 SEC (ref 9.8–13)
RBC # BLD: 3.96 M/UL (ref 4.2–5.9)
RBC # BLD: 3.99 M/UL (ref 4.2–5.9)
RBC # BLD: 4.06 M/UL (ref 4.2–5.9)
RBC # BLD: 4.07 M/UL (ref 4.2–5.9)
RBC # BLD: 4.12 M/UL (ref 4.2–5.9)
RBC # BLD: 4.13 M/UL (ref 4.2–5.9)
RBC # BLD: 4.14 M/UL (ref 4.2–5.9)
RBC # BLD: 4.15 M/UL (ref 4.2–5.9)
RBC # BLD: 4.15 M/UL (ref 4.2–5.9)
RBC # BLD: 4.17 M/UL (ref 4.2–5.9)
RBC # BLD: 4.18 M/UL (ref 4.2–5.9)
RBC # BLD: 4.19 M/UL (ref 4.2–5.9)
RBC # BLD: 4.2 M/UL (ref 4.2–5.9)
RBC # BLD: 4.2 M/UL (ref 4.2–5.9)
RBC # BLD: 4.23 M/UL (ref 4.2–5.9)
RBC # BLD: 4.26 M/UL (ref 4.2–5.9)
RBC # BLD: 4.26 M/UL (ref 4.2–5.9)
RBC # BLD: 4.27 M/UL (ref 4.2–5.9)
RBC # BLD: 4.32 M/UL (ref 4.2–5.9)
RBC # BLD: 4.32 M/UL (ref 4.2–5.9)
RBC # BLD: 4.43 M/UL (ref 4.2–5.9)
RBC # BLD: 4.43 M/UL (ref 4.2–5.9)
RBC # BLD: 4.45 M/UL (ref 4.2–5.9)
RBC # BLD: 4.48 M/UL (ref 4.2–5.9)
RBC # BLD: 4.49 M/UL (ref 4.2–5.9)
RBC # BLD: 5.04 M/UL (ref 4.2–5.9)
RBC FLUID: 3900 /CUMM
RBC UA: >100 /HPF (ref 0–2)
RBC UA: ABNORMAL /HPF (ref 0–2)
SODIUM BLD-SCNC: 135 MMOL/L (ref 136–145)
SODIUM BLD-SCNC: 135 MMOL/L (ref 136–145)
SODIUM BLD-SCNC: 136 MMOL/L (ref 136–145)
SODIUM BLD-SCNC: 136 MMOL/L (ref 136–145)
SODIUM BLD-SCNC: 137 MMOL/L (ref 136–145)
SODIUM BLD-SCNC: 138 MMOL/L (ref 136–145)
SODIUM BLD-SCNC: 139 MMOL/L (ref 136–145)
SODIUM BLD-SCNC: 139 MMOL/L (ref 136–145)
SODIUM BLD-SCNC: 140 MMOL/L (ref 136–145)
SODIUM BLD-SCNC: 141 MMOL/L (ref 136–145)
SODIUM BLD-SCNC: 142 MMOL/L (ref 136–145)
SPECIFIC GRAVITY UA: 1.02 (ref 1–1.03)
SPECIFIC GRAVITY UA: 1.02 (ref 1–1.03)
STREP PNEUMONIAE ANTIGEN, URINE: NORMAL
TCO2 ARTERIAL: 30.2 MMOL/L
TCO2 CALC VENOUS: 29 MMOL/L
TOTAL PROTEIN: 6.4 G/DL (ref 6.4–8.2)
TOTAL PROTEIN: 6.9 G/DL (ref 6.4–8.2)
TOTAL PROTEIN: 7.6 G/DL (ref 6.4–8.2)
TROPONIN: 0.01 NG/ML
TROPONIN: 0.02 NG/ML
TROPONIN: <0.01 NG/ML
TSH REFLEX: 2.6 UIU/ML (ref 0.27–4.2)
URINE CULTURE, ROUTINE: NORMAL
URINE REFLEX TO CULTURE: YES
URINE TYPE: ABNORMAL
URINE TYPE: ABNORMAL
UROBILINOGEN, URINE: 0.2 E.U./DL
UROBILINOGEN, URINE: 0.2 E.U./DL
WBC # BLD: 10.2 K/UL (ref 4–11)
WBC # BLD: 10.3 K/UL (ref 4–11)
WBC # BLD: 10.5 K/UL (ref 4–11)
WBC # BLD: 10.8 K/UL (ref 4–11)
WBC # BLD: 11.7 K/UL (ref 4–11)
WBC # BLD: 12.2 K/UL (ref 4–11)
WBC # BLD: 5.7 K/UL (ref 4–11)
WBC # BLD: 6 K/UL (ref 4–11)
WBC # BLD: 6.1 K/UL (ref 4–11)
WBC # BLD: 6.2 K/UL (ref 4–11)
WBC # BLD: 6.5 K/UL (ref 4–11)
WBC # BLD: 6.5 K/UL (ref 4–11)
WBC # BLD: 7.1 K/UL (ref 4–11)
WBC # BLD: 7.1 K/UL (ref 4–11)
WBC # BLD: 7.5 K/UL (ref 4–11)
WBC # BLD: 7.5 K/UL (ref 4–11)
WBC # BLD: 7.8 K/UL (ref 4–11)
WBC # BLD: 7.9 K/UL (ref 4–11)
WBC # BLD: 7.9 K/UL (ref 4–11)
WBC # BLD: 8.1 K/UL (ref 4–11)
WBC # BLD: 8.1 K/UL (ref 4–11)
WBC # BLD: 8.2 K/UL (ref 4–11)
WBC # BLD: 8.3 K/UL (ref 4–11)
WBC # BLD: 8.8 K/UL (ref 4–11)
WBC # BLD: 9.6 K/UL (ref 4–11)
WBC UA: ABNORMAL /HPF (ref 0–5)
WBC UA: ABNORMAL /HPF (ref 0–5)

## 2019-01-01 PROCEDURE — 94660 CPAP INITIATION&MGMT: CPT

## 2019-01-01 PROCEDURE — 97530 THERAPEUTIC ACTIVITIES: CPT

## 2019-01-01 PROCEDURE — 97110 THERAPEUTIC EXERCISES: CPT

## 2019-01-01 PROCEDURE — 94640 AIRWAY INHALATION TREATMENT: CPT

## 2019-01-01 PROCEDURE — 6370000000 HC RX 637 (ALT 250 FOR IP): Performed by: INTERNAL MEDICINE

## 2019-01-01 PROCEDURE — G8599 NO ASA/ANTIPLAT THER USE RNG: HCPCS | Performed by: NURSE PRACTITIONER

## 2019-01-01 PROCEDURE — 1280000000 HC REHAB R&B

## 2019-01-01 PROCEDURE — 94761 N-INVAS EAR/PLS OXIMETRY MLT: CPT

## 2019-01-01 PROCEDURE — 7100000010 HC PHASE II RECOVERY - FIRST 15 MIN: Performed by: INTERNAL MEDICINE

## 2019-01-01 PROCEDURE — 97162 PT EVAL MOD COMPLEX 30 MIN: CPT

## 2019-01-01 PROCEDURE — 2060000000 HC ICU INTERMEDIATE R&B

## 2019-01-01 PROCEDURE — 2700000000 HC OXYGEN THERAPY PER DAY

## 2019-01-01 PROCEDURE — 6370000000 HC RX 637 (ALT 250 FOR IP): Performed by: PHYSICAL MEDICINE & REHABILITATION

## 2019-01-01 PROCEDURE — 3609010800 HC BRONCHOSCOPY ALVEOLAR LAVAGE: Performed by: INTERNAL MEDICINE

## 2019-01-01 PROCEDURE — 36600 WITHDRAWAL OF ARTERIAL BLOOD: CPT

## 2019-01-01 PROCEDURE — 36415 COLL VENOUS BLD VENIPUNCTURE: CPT

## 2019-01-01 PROCEDURE — 80053 COMPREHEN METABOLIC PANEL: CPT

## 2019-01-01 PROCEDURE — 4004F PT TOBACCO SCREEN RCVD TLK: CPT | Performed by: NURSE PRACTITIONER

## 2019-01-01 PROCEDURE — 92526 ORAL FUNCTION THERAPY: CPT

## 2019-01-01 PROCEDURE — 2580000003 HC RX 258: Performed by: INTERNAL MEDICINE

## 2019-01-01 PROCEDURE — 97116 GAIT TRAINING THERAPY: CPT

## 2019-01-01 PROCEDURE — 81001 URINALYSIS AUTO W/SCOPE: CPT

## 2019-01-01 PROCEDURE — G8417 CALC BMI ABV UP PARAM F/U: HCPCS | Performed by: NURSE PRACTITIONER

## 2019-01-01 PROCEDURE — 97112 NEUROMUSCULAR REEDUCATION: CPT

## 2019-01-01 PROCEDURE — 1123F ACP DISCUSS/DSCN MKR DOCD: CPT | Performed by: NURSE PRACTITIONER

## 2019-01-01 PROCEDURE — 88305 TISSUE EXAM BY PATHOLOGIST: CPT

## 2019-01-01 PROCEDURE — 85027 COMPLETE CBC AUTOMATED: CPT

## 2019-01-01 PROCEDURE — 83735 ASSAY OF MAGNESIUM: CPT

## 2019-01-01 PROCEDURE — 2500000003 HC RX 250 WO HCPCS: Performed by: INTERNAL MEDICINE

## 2019-01-01 PROCEDURE — 2000000000 HC ICU R&B

## 2019-01-01 PROCEDURE — 99214 OFFICE O/P EST MOD 30 MIN: CPT | Performed by: NURSE PRACTITIONER

## 2019-01-01 PROCEDURE — 99233 SBSQ HOSP IP/OBS HIGH 50: CPT | Performed by: INTERNAL MEDICINE

## 2019-01-01 PROCEDURE — 80048 BASIC METABOLIC PNL TOTAL CA: CPT

## 2019-01-01 PROCEDURE — 85610 PROTHROMBIN TIME: CPT

## 2019-01-01 PROCEDURE — G8926 SPIRO NO PERF OR DOC: HCPCS | Performed by: NURSE PRACTITIONER

## 2019-01-01 PROCEDURE — 99232 SBSQ HOSP IP/OBS MODERATE 35: CPT | Performed by: INTERNAL MEDICINE

## 2019-01-01 PROCEDURE — 6360000002 HC RX W HCPCS: Performed by: INTERNAL MEDICINE

## 2019-01-01 PROCEDURE — 97535 SELF CARE MNGMENT TRAINING: CPT

## 2019-01-01 PROCEDURE — 0B978ZZ DRAINAGE OF LEFT MAIN BRONCHUS, VIA NATURAL OR ARTIFICIAL OPENING ENDOSCOPIC: ICD-10-PCS | Performed by: INTERNAL MEDICINE

## 2019-01-01 PROCEDURE — 86140 C-REACTIVE PROTEIN: CPT

## 2019-01-01 PROCEDURE — 85384 FIBRINOGEN ACTIVITY: CPT

## 2019-01-01 PROCEDURE — 76937 US GUIDE VASCULAR ACCESS: CPT | Performed by: SURGERY

## 2019-01-01 PROCEDURE — 80069 RENAL FUNCTION PANEL: CPT

## 2019-01-01 PROCEDURE — 99152 MOD SED SAME PHYS/QHP 5/>YRS: CPT

## 2019-01-01 PROCEDURE — 6360000002 HC RX W HCPCS: Performed by: EMERGENCY MEDICINE

## 2019-01-01 PROCEDURE — 51798 US URINE CAPACITY MEASURE: CPT

## 2019-01-01 PROCEDURE — 71046 X-RAY EXAM CHEST 2 VIEWS: CPT

## 2019-01-01 PROCEDURE — 87102 FUNGUS ISOLATION CULTURE: CPT

## 2019-01-01 PROCEDURE — 87015 SPECIMEN INFECT AGNT CONCNTJ: CPT

## 2019-01-01 PROCEDURE — 87040 BLOOD CULTURE FOR BACTERIA: CPT

## 2019-01-01 PROCEDURE — C1769 GUIDE WIRE: HCPCS

## 2019-01-01 PROCEDURE — 94669 MECHANICAL CHEST WALL OSCILL: CPT

## 2019-01-01 PROCEDURE — 7100000011 HC PHASE II RECOVERY - ADDTL 15 MIN: Performed by: INTERNAL MEDICINE

## 2019-01-01 PROCEDURE — 1111F DSCHRG MED/CURRENT MED MERGE: CPT | Performed by: NURSE PRACTITIONER

## 2019-01-01 PROCEDURE — G8484 FLU IMMUNIZE NO ADMIN: HCPCS | Performed by: NURSE PRACTITIONER

## 2019-01-01 PROCEDURE — 85025 COMPLETE CBC W/AUTO DIFF WBC: CPT

## 2019-01-01 PROCEDURE — 82803 BLOOD GASES ANY COMBINATION: CPT

## 2019-01-01 PROCEDURE — 83605 ASSAY OF LACTIC ACID: CPT

## 2019-01-01 PROCEDURE — 97127 HC OT THER IVNTJ W/FOCUS COG FUNCJ: CPT

## 2019-01-01 PROCEDURE — 97166 OT EVAL MOD COMPLEX 45 MIN: CPT

## 2019-01-01 PROCEDURE — 36014 PLACE CATHETER IN ARTERY: CPT | Performed by: SURGERY

## 2019-01-01 PROCEDURE — 71045 X-RAY EXAM CHEST 1 VIEW: CPT

## 2019-01-01 PROCEDURE — 99231 SBSQ HOSP IP/OBS SF/LOW 25: CPT | Performed by: SURGERY

## 2019-01-01 PROCEDURE — 87205 SMEAR GRAM STAIN: CPT

## 2019-01-01 PROCEDURE — G8598 ASA/ANTIPLAT THER USED: HCPCS | Performed by: NURSE PRACTITIONER

## 2019-01-01 PROCEDURE — 3E06317 INTRODUCTION OF OTHER THROMBOLYTIC INTO CENTRAL ARTERY, PERCUTANEOUS APPROACH: ICD-10-PCS | Performed by: SURGERY

## 2019-01-01 PROCEDURE — 3609010900 HC BRONCHOSCOPY THERAPUTIC ASPIRATION INITIAL: Performed by: INTERNAL MEDICINE

## 2019-01-01 PROCEDURE — 99223 1ST HOSP IP/OBS HIGH 75: CPT | Performed by: INTERNAL MEDICINE

## 2019-01-01 PROCEDURE — 92610 EVALUATE SWALLOWING FUNCTION: CPT

## 2019-01-01 PROCEDURE — 2709999900 US THORACENTESIS

## 2019-01-01 PROCEDURE — 99291 CRITICAL CARE FIRST HOUR: CPT

## 2019-01-01 PROCEDURE — 0B938ZZ DRAINAGE OF RIGHT MAIN BRONCHUS, VIA NATURAL OR ARTIFICIAL OPENING ENDOSCOPIC: ICD-10-PCS | Performed by: INTERNAL MEDICINE

## 2019-01-01 PROCEDURE — 36415 COLL VENOUS BLD VENIPUNCTURE: CPT | Performed by: NURSE PRACTITIONER

## 2019-01-01 PROCEDURE — 0W993ZZ DRAINAGE OF RIGHT PLEURAL CAVITY, PERCUTANEOUS APPROACH: ICD-10-PCS | Performed by: RADIOLOGY

## 2019-01-01 PROCEDURE — 82044 UR ALBUMIN SEMIQUANTITATIVE: CPT | Performed by: NURSE PRACTITIONER

## 2019-01-01 PROCEDURE — C1751 CATH, INF, PER/CENT/MIDLINE: HCPCS

## 2019-01-01 PROCEDURE — 87206 SMEAR FLUORESCENT/ACID STAI: CPT

## 2019-01-01 PROCEDURE — 85730 THROMBOPLASTIN TIME PARTIAL: CPT

## 2019-01-01 PROCEDURE — C1887 CATHETER, GUIDING: HCPCS

## 2019-01-01 PROCEDURE — 6360000002 HC RX W HCPCS

## 2019-01-01 PROCEDURE — 87449 NOS EACH ORGANISM AG IA: CPT

## 2019-01-01 PROCEDURE — 0B9F8ZX DRAINAGE OF RIGHT LOWER LUNG LOBE, VIA NATURAL OR ARTIFICIAL OPENING ENDOSCOPIC, DIAGNOSTIC: ICD-10-PCS | Performed by: INTERNAL MEDICINE

## 2019-01-01 PROCEDURE — 6360000002 HC RX W HCPCS: Performed by: SURGERY

## 2019-01-01 PROCEDURE — 93970 EXTREMITY STUDY: CPT

## 2019-01-01 PROCEDURE — 2709999900 HC NON-CHARGEABLE SUPPLY

## 2019-01-01 PROCEDURE — 88112 CYTOPATH CELL ENHANCE TECH: CPT

## 2019-01-01 PROCEDURE — G8427 DOCREV CUR MEDS BY ELIG CLIN: HCPCS | Performed by: NURSE PRACTITIONER

## 2019-01-01 PROCEDURE — 83036 HEMOGLOBIN GLYCOSYLATED A1C: CPT | Performed by: NURSE PRACTITIONER

## 2019-01-01 PROCEDURE — 93005 ELECTROCARDIOGRAM TRACING: CPT | Performed by: EMERGENCY MEDICINE

## 2019-01-01 PROCEDURE — 97167 OT EVAL HIGH COMPLEX 60 MIN: CPT

## 2019-01-01 PROCEDURE — 71250 CT THORAX DX C-: CPT

## 2019-01-01 PROCEDURE — 99152 MOD SED SAME PHYS/QHP 5/>YRS: CPT | Performed by: SURGERY

## 2019-01-01 PROCEDURE — 02HQ33Z INSERTION OF INFUSION DEVICE INTO RIGHT PULMONARY ARTERY, PERCUTANEOUS APPROACH: ICD-10-PCS | Performed by: SURGERY

## 2019-01-01 PROCEDURE — 87070 CULTURE OTHR SPECIMN AEROBIC: CPT

## 2019-01-01 PROCEDURE — 99291 CRITICAL CARE FIRST HOUR: CPT | Performed by: INTERNAL MEDICINE

## 2019-01-01 PROCEDURE — 6370000000 HC RX 637 (ALT 250 FOR IP): Performed by: EMERGENCY MEDICINE

## 2019-01-01 PROCEDURE — 96374 THER/PROPH/DIAG INJ IV PUSH: CPT

## 2019-01-01 PROCEDURE — 6360000002 HC RX W HCPCS: Performed by: NURSE PRACTITIONER

## 2019-01-01 PROCEDURE — 88312 SPECIAL STAINS GROUP 1: CPT

## 2019-01-01 PROCEDURE — 84484 ASSAY OF TROPONIN QUANT: CPT

## 2019-01-01 PROCEDURE — 99152 MOD SED SAME PHYS/QHP 5/>YRS: CPT | Performed by: INTERNAL MEDICINE

## 2019-01-01 PROCEDURE — 93306 TTE W/DOPPLER COMPLETE: CPT

## 2019-01-01 PROCEDURE — 6360000004 HC RX CONTRAST MEDICATION: Performed by: INTERNAL MEDICINE

## 2019-01-01 PROCEDURE — 87086 URINE CULTURE/COLONY COUNT: CPT

## 2019-01-01 PROCEDURE — 3023F SPIROM DOC REV: CPT | Performed by: NURSE PRACTITIONER

## 2019-01-01 PROCEDURE — 99153 MOD SED SAME PHYS/QHP EA: CPT

## 2019-01-01 PROCEDURE — 37211 THROMBOLYTIC ART THERAPY: CPT | Performed by: SURGERY

## 2019-01-01 PROCEDURE — 4040F PNEUMOC VAC/ADMIN/RCVD: CPT | Performed by: NURSE PRACTITIONER

## 2019-01-01 PROCEDURE — 87116 MYCOBACTERIA CULTURE: CPT

## 2019-01-01 PROCEDURE — 2580000003 HC RX 258: Performed by: SURGERY

## 2019-01-01 PROCEDURE — C1894 INTRO/SHEATH, NON-LASER: HCPCS

## 2019-01-01 PROCEDURE — 83036 HEMOGLOBIN GLYCOSYLATED A1C: CPT

## 2019-01-01 PROCEDURE — 84157 ASSAY OF PROTEIN OTHER: CPT

## 2019-01-01 PROCEDURE — G0500 MOD SEDAT ENDO SERVICE >5YRS: HCPCS | Performed by: INTERNAL MEDICINE

## 2019-01-01 PROCEDURE — 93010 ELECTROCARDIOGRAM REPORT: CPT | Performed by: INTERNAL MEDICINE

## 2019-01-01 PROCEDURE — 83615 LACTATE (LD) (LDH) ENZYME: CPT

## 2019-01-01 PROCEDURE — 02HR33Z INSERTION OF INFUSION DEVICE INTO LEFT PULMONARY ARTERY, PERCUTANEOUS APPROACH: ICD-10-PCS | Performed by: SURGERY

## 2019-01-01 PROCEDURE — 36014 PLACE CATHETER IN ARTERY: CPT

## 2019-01-01 PROCEDURE — 37211 THROMBOLYTIC ART THERAPY: CPT

## 2019-01-01 PROCEDURE — 94150 VITAL CAPACITY TEST: CPT

## 2019-01-01 PROCEDURE — 2709999900 HC NON-CHARGEABLE SUPPLY: Performed by: INTERNAL MEDICINE

## 2019-01-01 PROCEDURE — 71260 CT THORAX DX C+: CPT

## 2019-01-01 PROCEDURE — 2580000003 HC RX 258: Performed by: EMERGENCY MEDICINE

## 2019-01-01 PROCEDURE — 31645 BRNCHSC W/THER ASPIR 1ST: CPT | Performed by: INTERNAL MEDICINE

## 2019-01-01 PROCEDURE — 83880 ASSAY OF NATRIURETIC PEPTIDE: CPT

## 2019-01-01 PROCEDURE — 89051 BODY FLUID CELL COUNT: CPT

## 2019-01-01 RX ORDER — ACETAMINOPHEN 325 MG/1
650 TABLET ORAL EVERY 4 HOURS PRN
Status: DISCONTINUED | OUTPATIENT
Start: 2019-01-01 | End: 2019-01-01 | Stop reason: SDUPTHER

## 2019-01-01 RX ORDER — HEPARIN SODIUM 10000 [USP'U]/100ML
18.3 INJECTION, SOLUTION INTRAVENOUS CONTINUOUS
Status: DISCONTINUED | OUTPATIENT
Start: 2019-01-01 | End: 2019-01-01

## 2019-01-01 RX ORDER — DEXTROSE MONOHYDRATE 50 MG/ML
100 INJECTION, SOLUTION INTRAVENOUS PRN
Status: CANCELLED | OUTPATIENT
Start: 2019-01-01

## 2019-01-01 RX ORDER — ONDANSETRON 2 MG/ML
4 INJECTION INTRAMUSCULAR; INTRAVENOUS EVERY 6 HOURS PRN
Status: DISCONTINUED | OUTPATIENT
Start: 2019-01-01 | End: 2019-01-01 | Stop reason: HOSPADM

## 2019-01-01 RX ORDER — FUROSEMIDE 10 MG/ML
20 INJECTION INTRAMUSCULAR; INTRAVENOUS ONCE
Status: COMPLETED | OUTPATIENT
Start: 2019-01-01 | End: 2019-01-01

## 2019-01-01 RX ORDER — HYDROCODONE BITARTRATE AND ACETAMINOPHEN 5; 325 MG/1; MG/1
2 TABLET ORAL 3 TIMES DAILY
Qty: 180 TABLET | Refills: 0 | Status: SHIPPED | OUTPATIENT
Start: 2019-01-01 | End: 2020-01-01 | Stop reason: SDUPTHER

## 2019-01-01 RX ORDER — BUPROPION HYDROCHLORIDE 150 MG/1
150 TABLET ORAL DAILY
Status: DISCONTINUED | OUTPATIENT
Start: 2019-01-01 | End: 2019-01-01 | Stop reason: HOSPADM

## 2019-01-01 RX ORDER — METFORMIN HYDROCHLORIDE 500 MG/1
TABLET, EXTENDED RELEASE ORAL
Qty: 30 TABLET | Refills: 5 | Status: SHIPPED | OUTPATIENT
Start: 2019-01-01

## 2019-01-01 RX ORDER — HYDROCODONE BITARTRATE AND ACETAMINOPHEN 5; 325 MG/1; MG/1
1 TABLET ORAL 3 TIMES DAILY
Status: DISCONTINUED | OUTPATIENT
Start: 2019-01-01 | End: 2019-01-01 | Stop reason: SDUPTHER

## 2019-01-01 RX ORDER — HYDRALAZINE HYDROCHLORIDE 50 MG/1
50 TABLET, FILM COATED ORAL EVERY 6 HOURS PRN
Status: CANCELLED | OUTPATIENT
Start: 2019-01-01

## 2019-01-01 RX ORDER — ACETAMINOPHEN 325 MG/1
650 TABLET ORAL EVERY 4 HOURS PRN
Status: CANCELLED | OUTPATIENT
Start: 2019-01-01

## 2019-01-01 RX ORDER — DEXTROSE MONOHYDRATE 50 MG/ML
100 INJECTION, SOLUTION INTRAVENOUS PRN
Status: DISCONTINUED | OUTPATIENT
Start: 2019-01-01 | End: 2019-01-01 | Stop reason: HOSPADM

## 2019-01-01 RX ORDER — NICOTINE POLACRILEX 4 MG
15 LOZENGE BUCCAL PRN
Status: DISCONTINUED | OUTPATIENT
Start: 2019-01-01 | End: 2019-01-01 | Stop reason: HOSPADM

## 2019-01-01 RX ORDER — MORPHINE SULFATE 2 MG/ML
2 INJECTION, SOLUTION INTRAMUSCULAR; INTRAVENOUS EVERY 4 HOURS PRN
Status: DISCONTINUED | OUTPATIENT
Start: 2019-01-01 | End: 2019-01-01 | Stop reason: HOSPADM

## 2019-01-01 RX ORDER — DEXTROSE MONOHYDRATE 25 G/50ML
12.5 INJECTION, SOLUTION INTRAVENOUS PRN
Status: DISCONTINUED | OUTPATIENT
Start: 2019-01-01 | End: 2019-01-01 | Stop reason: HOSPADM

## 2019-01-01 RX ORDER — IPRATROPIUM BROMIDE AND ALBUTEROL SULFATE 2.5; .5 MG/3ML; MG/3ML
1 SOLUTION RESPIRATORY (INHALATION) EVERY 4 HOURS
Status: DISCONTINUED | OUTPATIENT
Start: 2019-01-01 | End: 2019-01-01

## 2019-01-01 RX ORDER — GLIPIZIDE 5 MG/1
5 TABLET ORAL
Status: DISCONTINUED | OUTPATIENT
Start: 2019-01-01 | End: 2019-01-01

## 2019-01-01 RX ORDER — IPRATROPIUM BROMIDE AND ALBUTEROL SULFATE 2.5; .5 MG/3ML; MG/3ML
1 SOLUTION RESPIRATORY (INHALATION)
Status: DISCONTINUED | OUTPATIENT
Start: 2019-01-01 | End: 2019-01-01 | Stop reason: HOSPADM

## 2019-01-01 RX ORDER — IPRATROPIUM BROMIDE AND ALBUTEROL SULFATE 2.5; .5 MG/3ML; MG/3ML
1 SOLUTION RESPIRATORY (INHALATION) ONCE
Status: COMPLETED | OUTPATIENT
Start: 2019-01-01 | End: 2019-01-01

## 2019-01-01 RX ORDER — IPRATROPIUM BROMIDE AND ALBUTEROL SULFATE 2.5; .5 MG/3ML; MG/3ML
1 SOLUTION RESPIRATORY (INHALATION)
Status: DISCONTINUED | OUTPATIENT
Start: 2019-01-01 | End: 2019-01-01

## 2019-01-01 RX ORDER — GLIPIZIDE 5 MG/1
TABLET ORAL
Qty: 30 TABLET | Refills: 0 | OUTPATIENT
Start: 2019-01-01

## 2019-01-01 RX ORDER — SODIUM CHLORIDE 9 MG/ML
INJECTION, SOLUTION INTRAVENOUS
Status: DISPENSED
Start: 2019-01-01 | End: 2019-01-01

## 2019-01-01 RX ORDER — ATORVASTATIN CALCIUM 40 MG/1
40 TABLET, FILM COATED ORAL NIGHTLY
Status: CANCELLED | OUTPATIENT
Start: 2019-01-01

## 2019-01-01 RX ORDER — IPRATROPIUM BROMIDE AND ALBUTEROL SULFATE 2.5; .5 MG/3ML; MG/3ML
3 SOLUTION RESPIRATORY (INHALATION)
Qty: 360 ML | Status: ON HOLD | DISCHARGE
Start: 2019-01-01 | End: 2019-01-01 | Stop reason: HOSPADM

## 2019-01-01 RX ORDER — LISINOPRIL 20 MG/1
20 TABLET ORAL DAILY
Status: DISCONTINUED | OUTPATIENT
Start: 2019-01-01 | End: 2019-01-01 | Stop reason: HOSPADM

## 2019-01-01 RX ORDER — GLIPIZIDE 5 MG/1
5 TABLET ORAL DAILY
Qty: 30 TABLET | Refills: 5 | Status: SHIPPED | OUTPATIENT
Start: 2019-01-01 | End: 2020-01-01

## 2019-01-01 RX ORDER — HYDROCODONE BITARTRATE AND ACETAMINOPHEN 5; 325 MG/1; MG/1
1 TABLET ORAL 3 TIMES DAILY
Qty: 90 TABLET | Refills: 0 | Status: SHIPPED | OUTPATIENT
Start: 2019-01-01 | End: 2019-01-01 | Stop reason: SDUPTHER

## 2019-01-01 RX ORDER — FUROSEMIDE 40 MG/1
40 TABLET ORAL 2 TIMES DAILY
Status: DISCONTINUED | OUTPATIENT
Start: 2019-01-01 | End: 2019-01-01

## 2019-01-01 RX ORDER — ATORVASTATIN CALCIUM 40 MG/1
40 TABLET, FILM COATED ORAL NIGHTLY
Status: DISCONTINUED | OUTPATIENT
Start: 2019-01-01 | End: 2019-01-01 | Stop reason: HOSPADM

## 2019-01-01 RX ORDER — FUROSEMIDE 40 MG/1
TABLET ORAL
Qty: 30 TABLET | Refills: 0 | Status: ON HOLD | OUTPATIENT
Start: 2019-01-01 | End: 2019-01-01 | Stop reason: SDUPTHER

## 2019-01-01 RX ORDER — POTASSIUM CHLORIDE 7.45 MG/ML
10 INJECTION INTRAVENOUS
Status: COMPLETED | OUTPATIENT
Start: 2019-01-01 | End: 2019-01-01

## 2019-01-01 RX ORDER — LISINOPRIL 40 MG/1
TABLET ORAL
Qty: 30 TABLET | Refills: 3 | Status: SHIPPED | OUTPATIENT
Start: 2019-01-01 | End: 2020-01-01

## 2019-01-01 RX ORDER — FUROSEMIDE 40 MG/1
40 TABLET ORAL DAILY
Status: DISCONTINUED | OUTPATIENT
Start: 2019-01-01 | End: 2019-01-01

## 2019-01-01 RX ORDER — SODIUM CHLORIDE 0.9 % (FLUSH) 0.9 %
10 SYRINGE (ML) INJECTION EVERY 12 HOURS SCHEDULED
Status: DISCONTINUED | OUTPATIENT
Start: 2019-01-01 | End: 2019-01-01 | Stop reason: SDUPTHER

## 2019-01-01 RX ORDER — ONDANSETRON 4 MG/1
8 TABLET, ORALLY DISINTEGRATING ORAL EVERY 8 HOURS PRN
Status: DISCONTINUED | OUTPATIENT
Start: 2019-01-01 | End: 2019-01-01 | Stop reason: HOSPADM

## 2019-01-01 RX ORDER — FAMOTIDINE 20 MG/1
20 TABLET, FILM COATED ORAL 2 TIMES DAILY
Status: DISCONTINUED | OUTPATIENT
Start: 2019-01-01 | End: 2019-01-01 | Stop reason: HOSPADM

## 2019-01-01 RX ORDER — FUROSEMIDE 40 MG/1
40 TABLET ORAL 2 TIMES DAILY
Qty: 30 TABLET | Refills: 0 | Status: ON HOLD | DISCHARGE
Start: 2019-01-01 | End: 2019-01-01 | Stop reason: SDUPTHER

## 2019-01-01 RX ORDER — HYDROCODONE BITARTRATE AND ACETAMINOPHEN 5; 325 MG/1; MG/1
1 TABLET ORAL 3 TIMES DAILY
Status: CANCELLED | OUTPATIENT
Start: 2019-01-01 | Stop reason: SDUPTHER

## 2019-01-01 RX ORDER — BISACODYL 10 MG
10 SUPPOSITORY, RECTAL RECTAL DAILY PRN
Status: DISCONTINUED | OUTPATIENT
Start: 2019-01-01 | End: 2019-01-01 | Stop reason: HOSPADM

## 2019-01-01 RX ORDER — HEPARIN SODIUM 10000 [USP'U]/100ML
18 INJECTION, SOLUTION INTRAVENOUS CONTINUOUS
Status: DISCONTINUED | OUTPATIENT
Start: 2019-01-01 | End: 2019-01-01 | Stop reason: CLARIF

## 2019-01-01 RX ORDER — LISINOPRIL 40 MG/1
40 TABLET ORAL DAILY
Qty: 30 TABLET | Refills: 1 | Status: SHIPPED | OUTPATIENT
Start: 2019-01-01 | End: 2019-01-01

## 2019-01-01 RX ORDER — FUROSEMIDE 10 MG/ML
40 INJECTION INTRAMUSCULAR; INTRAVENOUS 2 TIMES DAILY
Status: DISCONTINUED | OUTPATIENT
Start: 2019-01-01 | End: 2019-01-01 | Stop reason: HOSPADM

## 2019-01-01 RX ORDER — HEPARIN SODIUM 10000 [USP'U]/100ML
250 INJECTION, SOLUTION INTRAVENOUS CONTINUOUS
Status: DISCONTINUED | OUTPATIENT
Start: 2019-01-01 | End: 2019-01-01

## 2019-01-01 RX ORDER — TIZANIDINE 4 MG/1
4 TABLET ORAL NIGHTLY
Status: CANCELLED | OUTPATIENT
Start: 2019-01-01

## 2019-01-01 RX ORDER — METFORMIN HYDROCHLORIDE 500 MG/1
500 TABLET, EXTENDED RELEASE ORAL
Status: DISCONTINUED | OUTPATIENT
Start: 2019-01-01 | End: 2019-01-01

## 2019-01-01 RX ORDER — HYDROCODONE BITARTRATE AND ACETAMINOPHEN 5; 325 MG/1; MG/1
1 TABLET ORAL EVERY 6 HOURS PRN
Status: DISCONTINUED | OUTPATIENT
Start: 2019-01-01 | End: 2019-01-01 | Stop reason: HOSPADM

## 2019-01-01 RX ORDER — TIZANIDINE 4 MG/1
4 TABLET ORAL NIGHTLY
Status: DISCONTINUED | OUTPATIENT
Start: 2019-01-01 | End: 2019-01-01 | Stop reason: HOSPADM

## 2019-01-01 RX ORDER — MAGNESIUM HYDROXIDE 1200 MG/15ML
LIQUID ORAL CONTINUOUS PRN
Status: COMPLETED | OUTPATIENT
Start: 2019-01-01 | End: 2019-01-01

## 2019-01-01 RX ORDER — ACETAMINOPHEN 325 MG/1
650 TABLET ORAL EVERY 4 HOURS PRN
Status: DISCONTINUED | OUTPATIENT
Start: 2019-01-01 | End: 2019-01-01 | Stop reason: HOSPADM

## 2019-01-01 RX ORDER — HEPARIN SODIUM 1000 [USP'U]/ML
80 INJECTION, SOLUTION INTRAVENOUS; SUBCUTANEOUS ONCE
Status: DISCONTINUED | OUTPATIENT
Start: 2019-01-01 | End: 2019-01-01

## 2019-01-01 RX ORDER — MAGNESIUM SULFATE 1 G/100ML
1 INJECTION INTRAVENOUS PRN
Status: DISCONTINUED | OUTPATIENT
Start: 2019-01-01 | End: 2019-01-01 | Stop reason: HOSPADM

## 2019-01-01 RX ORDER — METFORMIN HYDROCHLORIDE 500 MG/1
TABLET, EXTENDED RELEASE ORAL
Qty: 30 TABLET | Refills: 0 | OUTPATIENT
Start: 2019-01-01

## 2019-01-01 RX ORDER — ATORVASTATIN CALCIUM 40 MG/1
TABLET, FILM COATED ORAL
Qty: 90 TABLET | Refills: 1 | Status: SHIPPED | OUTPATIENT
Start: 2019-01-01

## 2019-01-01 RX ORDER — BUPROPION HYDROCHLORIDE 150 MG/1
150 TABLET ORAL DAILY
Status: CANCELLED | OUTPATIENT
Start: 2019-01-01

## 2019-01-01 RX ORDER — SODIUM CHLORIDE 9 MG/ML
INJECTION, SOLUTION INTRAVENOUS CONTINUOUS
Status: DISCONTINUED | OUTPATIENT
Start: 2019-01-01 | End: 2019-01-01

## 2019-01-01 RX ORDER — NICOTINE POLACRILEX 4 MG
15 LOZENGE BUCCAL PRN
Status: CANCELLED | OUTPATIENT
Start: 2019-01-01

## 2019-01-01 RX ORDER — HEPARIN SODIUM 1000 [USP'U]/ML
7300 INJECTION, SOLUTION INTRAVENOUS; SUBCUTANEOUS PRN
Status: DISCONTINUED | OUTPATIENT
Start: 2019-01-01 | End: 2019-01-01

## 2019-01-01 RX ORDER — MIDAZOLAM HYDROCHLORIDE 1 MG/ML
1 INJECTION INTRAMUSCULAR; INTRAVENOUS ONCE
Status: COMPLETED | OUTPATIENT
Start: 2019-01-01 | End: 2019-01-01

## 2019-01-01 RX ORDER — FAMOTIDINE 20 MG/1
20 TABLET, FILM COATED ORAL 2 TIMES DAILY
Status: CANCELLED | OUTPATIENT
Start: 2019-01-01

## 2019-01-01 RX ORDER — FENOFIBRATE 200 MG/1
CAPSULE ORAL
Qty: 30 CAPSULE | Refills: 5 | Status: SHIPPED | OUTPATIENT
Start: 2019-01-01 | End: 2020-01-01

## 2019-01-01 RX ORDER — LISINOPRIL 20 MG/1
20 TABLET ORAL DAILY
Status: CANCELLED | OUTPATIENT
Start: 2019-01-01

## 2019-01-01 RX ORDER — HEPARIN SODIUM 1000 [USP'U]/ML
80 INJECTION, SOLUTION INTRAVENOUS; SUBCUTANEOUS PRN
Status: DISCONTINUED | OUTPATIENT
Start: 2019-01-01 | End: 2019-01-01 | Stop reason: CLARIF

## 2019-01-01 RX ORDER — TRAZODONE HYDROCHLORIDE 50 MG/1
50 TABLET ORAL NIGHTLY PRN
Status: DISCONTINUED | OUTPATIENT
Start: 2019-01-01 | End: 2019-01-01 | Stop reason: HOSPADM

## 2019-01-01 RX ORDER — ONDANSETRON 2 MG/ML
4 INJECTION INTRAMUSCULAR; INTRAVENOUS EVERY 6 HOURS PRN
Status: DISCONTINUED | OUTPATIENT
Start: 2019-01-01 | End: 2019-01-01 | Stop reason: SDUPTHER

## 2019-01-01 RX ORDER — BUPROPION HYDROCHLORIDE 150 MG/1
TABLET ORAL
Qty: 30 TABLET | Refills: 0 | Status: SHIPPED | OUTPATIENT
Start: 2019-01-01 | End: 2019-01-01 | Stop reason: SDUPTHER

## 2019-01-01 RX ORDER — FAMOTIDINE 20 MG/1
20 TABLET, FILM COATED ORAL 2 TIMES DAILY
Qty: 60 TABLET | Refills: 3 | DISCHARGE
Start: 2019-01-01

## 2019-01-01 RX ORDER — SODIUM CHLORIDE 0.9 % (FLUSH) 0.9 %
10 SYRINGE (ML) INJECTION PRN
Status: DISCONTINUED | OUTPATIENT
Start: 2019-01-01 | End: 2019-01-01 | Stop reason: HOSPADM

## 2019-01-01 RX ORDER — CEFUROXIME AXETIL 250 MG/1
500 TABLET ORAL EVERY 12 HOURS SCHEDULED
Status: COMPLETED | OUTPATIENT
Start: 2019-01-01 | End: 2019-01-01

## 2019-01-01 RX ORDER — DEXTROSE MONOHYDRATE 25 G/50ML
12.5 INJECTION, SOLUTION INTRAVENOUS PRN
Status: CANCELLED | OUTPATIENT
Start: 2019-01-01

## 2019-01-01 RX ORDER — FENTANYL CITRATE 50 UG/ML
INJECTION, SOLUTION INTRAMUSCULAR; INTRAVENOUS PRN
Status: DISCONTINUED | OUTPATIENT
Start: 2019-01-01 | End: 2019-01-01 | Stop reason: ALTCHOICE

## 2019-01-01 RX ORDER — GLIPIZIDE 5 MG/1
TABLET ORAL
Qty: 30 TABLET | OUTPATIENT
Start: 2019-01-01

## 2019-01-01 RX ORDER — TIZANIDINE 4 MG/1
TABLET ORAL
Qty: 90 TABLET | Refills: 0 | Status: SHIPPED | OUTPATIENT
Start: 2019-01-01 | End: 2020-01-01

## 2019-01-01 RX ORDER — FUROSEMIDE 40 MG/1
40 TABLET ORAL 2 TIMES DAILY
Status: DISCONTINUED | OUTPATIENT
Start: 2019-01-01 | End: 2019-01-01 | Stop reason: HOSPADM

## 2019-01-01 RX ORDER — 0.9 % SODIUM CHLORIDE 0.9 %
INTRAVENOUS SOLUTION INTRAVENOUS CONTINUOUS PRN
Status: COMPLETED | OUTPATIENT
Start: 2019-01-01 | End: 2019-01-01

## 2019-01-01 RX ORDER — HYDRALAZINE HYDROCHLORIDE 25 MG/1
50 TABLET, FILM COATED ORAL EVERY 6 HOURS PRN
Status: DISCONTINUED | OUTPATIENT
Start: 2019-01-01 | End: 2019-01-01 | Stop reason: HOSPADM

## 2019-01-01 RX ORDER — SODIUM CHLORIDE 0.9 % (FLUSH) 0.9 %
10 SYRINGE (ML) INJECTION PRN
Status: DISCONTINUED | OUTPATIENT
Start: 2019-01-01 | End: 2019-01-01 | Stop reason: SDUPTHER

## 2019-01-01 RX ORDER — BUPROPION HYDROCHLORIDE 150 MG/1
TABLET ORAL
Qty: 30 TABLET | Refills: 3 | Status: SHIPPED | OUTPATIENT
Start: 2019-01-01 | End: 2020-01-01

## 2019-01-01 RX ORDER — ONDANSETRON 4 MG/1
8 TABLET, ORALLY DISINTEGRATING ORAL EVERY 8 HOURS PRN
Status: CANCELLED | OUTPATIENT
Start: 2019-01-01

## 2019-01-01 RX ORDER — 0.9 % SODIUM CHLORIDE 0.9 %
30 INTRAVENOUS SOLUTION INTRAVENOUS ONCE
Status: COMPLETED | OUTPATIENT
Start: 2019-01-01 | End: 2019-01-01

## 2019-01-01 RX ORDER — HEPARIN SODIUM 1000 [USP'U]/ML
3700 INJECTION, SOLUTION INTRAVENOUS; SUBCUTANEOUS PRN
Status: DISCONTINUED | OUTPATIENT
Start: 2019-01-01 | End: 2019-01-01

## 2019-01-01 RX ORDER — IPRATROPIUM BROMIDE AND ALBUTEROL SULFATE 2.5; .5 MG/3ML; MG/3ML
1 SOLUTION RESPIRATORY (INHALATION)
Status: CANCELLED | OUTPATIENT
Start: 2019-01-01

## 2019-01-01 RX ORDER — TRAZODONE HYDROCHLORIDE 50 MG/1
50 TABLET ORAL NIGHTLY PRN
Status: CANCELLED | OUTPATIENT
Start: 2019-01-01

## 2019-01-01 RX ORDER — METFORMIN HYDROCHLORIDE 500 MG/1
TABLET, EXTENDED RELEASE ORAL
Qty: 30 TABLET | Refills: 3 | Status: ON HOLD | OUTPATIENT
Start: 2019-01-01 | End: 2019-01-01 | Stop reason: HOSPADM

## 2019-01-01 RX ORDER — MIDAZOLAM HYDROCHLORIDE 5 MG/ML
INJECTION INTRAMUSCULAR; INTRAVENOUS PRN
Status: DISCONTINUED | OUTPATIENT
Start: 2019-01-01 | End: 2019-01-01 | Stop reason: ALTCHOICE

## 2019-01-01 RX ORDER — FENOFIBRATE 160 MG/1
160 TABLET ORAL DAILY
Status: COMPLETED | OUTPATIENT
Start: 2019-01-01 | End: 2019-01-01

## 2019-01-01 RX ORDER — HEPARIN SODIUM 1000 [USP'U]/ML
80 INJECTION, SOLUTION INTRAVENOUS; SUBCUTANEOUS ONCE
Status: DISCONTINUED | OUTPATIENT
Start: 2019-01-01 | End: 2019-01-01 | Stop reason: CLARIF

## 2019-01-01 RX ORDER — FUROSEMIDE 40 MG/1
40 TABLET ORAL 2 TIMES DAILY
Status: CANCELLED | OUTPATIENT
Start: 2019-01-01

## 2019-01-01 RX ORDER — ATORVASTATIN CALCIUM 10 MG/1
40 TABLET, FILM COATED ORAL NIGHTLY
Status: DISCONTINUED | OUTPATIENT
Start: 2019-01-01 | End: 2019-01-01 | Stop reason: HOSPADM

## 2019-01-01 RX ORDER — HYDROCODONE BITARTRATE AND ACETAMINOPHEN 5; 325 MG/1; MG/1
1 TABLET ORAL EVERY 6 HOURS PRN
Status: CANCELLED | OUTPATIENT
Start: 2019-01-01

## 2019-01-01 RX ORDER — LISINOPRIL 20 MG/1
20 TABLET ORAL DAILY
Status: DISCONTINUED | OUTPATIENT
Start: 2019-01-01 | End: 2019-01-01

## 2019-01-01 RX ORDER — SODIUM CHLORIDE 0.9 % (FLUSH) 0.9 %
10 SYRINGE (ML) INJECTION EVERY 12 HOURS SCHEDULED
Status: DISCONTINUED | OUTPATIENT
Start: 2019-01-01 | End: 2019-01-01 | Stop reason: HOSPADM

## 2019-01-01 RX ORDER — FUROSEMIDE 40 MG/1
40 TABLET ORAL 2 TIMES DAILY
Qty: 30 TABLET | Refills: 0 | Status: SHIPPED | OUTPATIENT
Start: 2019-01-01 | End: 2019-01-01 | Stop reason: SDUPTHER

## 2019-01-01 RX ORDER — LIDOCAINE HYDROCHLORIDE 20 MG/ML
INJECTION, SOLUTION INFILTRATION; PERINEURAL PRN
Status: DISCONTINUED | OUTPATIENT
Start: 2019-01-01 | End: 2019-01-01 | Stop reason: ALTCHOICE

## 2019-01-01 RX ORDER — SODIUM CHLORIDE 9 MG/ML
INJECTION, SOLUTION INTRAVENOUS CONTINUOUS PRN
Status: ACTIVE | OUTPATIENT
Start: 2019-01-01 | End: 2019-01-01

## 2019-01-01 RX ADMIN — APIXABAN 5 MG: 5 TABLET, FILM COATED ORAL at 20:36

## 2019-01-01 RX ADMIN — APIXABAN 5 MG: 5 TABLET, FILM COATED ORAL at 09:08

## 2019-01-01 RX ADMIN — FAMOTIDINE 20 MG: 20 TABLET ORAL at 09:10

## 2019-01-01 RX ADMIN — IPRATROPIUM BROMIDE AND ALBUTEROL SULFATE 1 AMPULE: .5; 3 SOLUTION RESPIRATORY (INHALATION) at 20:08

## 2019-01-01 RX ADMIN — SODIUM CHLORIDE: 9 INJECTION, SOLUTION INTRAVENOUS at 12:27

## 2019-01-01 RX ADMIN — FAMOTIDINE 20 MG: 20 TABLET ORAL at 19:57

## 2019-01-01 RX ADMIN — HYDROCODONE BITARTRATE AND ACETAMINOPHEN 1 TABLET: 5; 325 TABLET ORAL at 14:00

## 2019-01-01 RX ADMIN — IPRATROPIUM BROMIDE AND ALBUTEROL SULFATE 1 AMPULE: .5; 3 SOLUTION RESPIRATORY (INHALATION) at 20:39

## 2019-01-01 RX ADMIN — Medication 10 ML: at 22:16

## 2019-01-01 RX ADMIN — FAMOTIDINE 20 MG: 20 TABLET ORAL at 20:19

## 2019-01-01 RX ADMIN — FAMOTIDINE 20 MG: 20 TABLET ORAL at 21:57

## 2019-01-01 RX ADMIN — CEFUROXIME AXETIL 500 MG: 250 TABLET ORAL at 09:10

## 2019-01-01 RX ADMIN — LISINOPRIL 20 MG: 20 TABLET ORAL at 08:57

## 2019-01-01 RX ADMIN — IPRATROPIUM BROMIDE AND ALBUTEROL SULFATE 1 AMPULE: .5; 3 SOLUTION RESPIRATORY (INHALATION) at 19:20

## 2019-01-01 RX ADMIN — INSULIN LISPRO 1 UNITS: 100 INJECTION, SOLUTION INTRAVENOUS; SUBCUTANEOUS at 17:41

## 2019-01-01 RX ADMIN — INSULIN LISPRO 1 UNITS: 100 INJECTION, SOLUTION INTRAVENOUS; SUBCUTANEOUS at 12:47

## 2019-01-01 RX ADMIN — FAMOTIDINE 20 MG: 20 TABLET ORAL at 19:49

## 2019-01-01 RX ADMIN — IPRATROPIUM BROMIDE AND ALBUTEROL SULFATE 1 AMPULE: .5; 3 SOLUTION RESPIRATORY (INHALATION) at 00:09

## 2019-01-01 RX ADMIN — TIZANIDINE 4 MG: 4 TABLET ORAL at 19:49

## 2019-01-01 RX ADMIN — IPRATROPIUM BROMIDE AND ALBUTEROL SULFATE 1 AMPULE: .5; 3 SOLUTION RESPIRATORY (INHALATION) at 16:15

## 2019-01-01 RX ADMIN — ATORVASTATIN CALCIUM 40 MG: 40 TABLET, FILM COATED ORAL at 20:07

## 2019-01-01 RX ADMIN — ATORVASTATIN CALCIUM 40 MG: 10 TABLET, FILM COATED ORAL at 20:56

## 2019-01-01 RX ADMIN — FUROSEMIDE 40 MG: 40 TABLET ORAL at 09:23

## 2019-01-01 RX ADMIN — LISINOPRIL 20 MG: 20 TABLET ORAL at 09:23

## 2019-01-01 RX ADMIN — IPRATROPIUM BROMIDE AND ALBUTEROL SULFATE 1 AMPULE: .5; 3 SOLUTION RESPIRATORY (INHALATION) at 07:40

## 2019-01-01 RX ADMIN — HYDROCODONE BITARTRATE AND ACETAMINOPHEN 1 TABLET: 5; 325 TABLET ORAL at 20:30

## 2019-01-01 RX ADMIN — BUPROPION HYDROCHLORIDE 150 MG: 150 TABLET, FILM COATED, EXTENDED RELEASE ORAL at 08:29

## 2019-01-01 RX ADMIN — LISINOPRIL 30 MG: 20 TABLET ORAL at 09:47

## 2019-01-01 RX ADMIN — LISINOPRIL 30 MG: 20 TABLET ORAL at 07:50

## 2019-01-01 RX ADMIN — INSULIN LISPRO 1 UNITS: 100 INJECTION, SOLUTION INTRAVENOUS; SUBCUTANEOUS at 06:52

## 2019-01-01 RX ADMIN — LISINOPRIL 20 MG: 20 TABLET ORAL at 09:20

## 2019-01-01 RX ADMIN — BUPROPION HYDROCHLORIDE 150 MG: 150 TABLET, FILM COATED, EXTENDED RELEASE ORAL at 09:48

## 2019-01-01 RX ADMIN — INSULIN LISPRO 1 UNITS: 100 INJECTION, SOLUTION INTRAVENOUS; SUBCUTANEOUS at 11:39

## 2019-01-01 RX ADMIN — ATORVASTATIN CALCIUM 40 MG: 40 TABLET, FILM COATED ORAL at 21:07

## 2019-01-01 RX ADMIN — APIXABAN 5 MG: 5 TABLET, FILM COATED ORAL at 21:39

## 2019-01-01 RX ADMIN — ALTEPLASE 1 MG/HR: 2.2 INJECTION, POWDER, LYOPHILIZED, FOR SOLUTION INTRAVENOUS at 12:24

## 2019-01-01 RX ADMIN — APIXABAN 5 MG: 5 TABLET, FILM COATED ORAL at 08:19

## 2019-01-01 RX ADMIN — HYDROCODONE BITARTRATE AND ACETAMINOPHEN 1 TABLET: 5; 325 TABLET ORAL at 20:41

## 2019-01-01 RX ADMIN — INSULIN LISPRO 1 UNITS: 100 INJECTION, SOLUTION INTRAVENOUS; SUBCUTANEOUS at 22:08

## 2019-01-01 RX ADMIN — INSULIN LISPRO 1 UNITS: 100 INJECTION, SOLUTION INTRAVENOUS; SUBCUTANEOUS at 16:38

## 2019-01-01 RX ADMIN — MUPIROCIN: 20 OINTMENT TOPICAL at 21:35

## 2019-01-01 RX ADMIN — IPRATROPIUM BROMIDE AND ALBUTEROL SULFATE 1 AMPULE: .5; 3 SOLUTION RESPIRATORY (INHALATION) at 19:51

## 2019-01-01 RX ADMIN — FUROSEMIDE 40 MG: 40 TABLET ORAL at 17:07

## 2019-01-01 RX ADMIN — Medication 10 ML: at 20:56

## 2019-01-01 RX ADMIN — BUPROPION HYDROCHLORIDE 150 MG: 150 TABLET, FILM COATED, EXTENDED RELEASE ORAL at 07:56

## 2019-01-01 RX ADMIN — APIXABAN 10 MG: 5 TABLET, FILM COATED ORAL at 20:46

## 2019-01-01 RX ADMIN — FAMOTIDINE 20 MG: 20 TABLET ORAL at 10:07

## 2019-01-01 RX ADMIN — FUROSEMIDE 40 MG: 10 INJECTION, SOLUTION INTRAMUSCULAR; INTRAVENOUS at 17:48

## 2019-01-01 RX ADMIN — INSULIN LISPRO 1 UNITS: 100 INJECTION, SOLUTION INTRAVENOUS; SUBCUTANEOUS at 20:20

## 2019-01-01 RX ADMIN — TIZANIDINE 4 MG: 4 TABLET ORAL at 21:04

## 2019-01-01 RX ADMIN — MUPIROCIN: 20 OINTMENT TOPICAL at 08:50

## 2019-01-01 RX ADMIN — IPRATROPIUM BROMIDE AND ALBUTEROL SULFATE 1 AMPULE: .5; 3 SOLUTION RESPIRATORY (INHALATION) at 08:56

## 2019-01-01 RX ADMIN — IPRATROPIUM BROMIDE AND ALBUTEROL SULFATE 1 AMPULE: .5; 3 SOLUTION RESPIRATORY (INHALATION) at 15:35

## 2019-01-01 RX ADMIN — HEPARIN SODIUM AND DEXTROSE 250 UNITS/HR: 10000; 5 INJECTION INTRAVENOUS at 12:23

## 2019-01-01 RX ADMIN — IPRATROPIUM BROMIDE AND ALBUTEROL SULFATE 1 AMPULE: .5; 3 SOLUTION RESPIRATORY (INHALATION) at 20:20

## 2019-01-01 RX ADMIN — ATORVASTATIN CALCIUM 40 MG: 40 TABLET, FILM COATED ORAL at 19:49

## 2019-01-01 RX ADMIN — TIZANIDINE 4 MG: 4 TABLET ORAL at 20:41

## 2019-01-01 RX ADMIN — FUROSEMIDE 40 MG: 40 TABLET ORAL at 14:59

## 2019-01-01 RX ADMIN — HYDROCODONE BITARTRATE AND ACETAMINOPHEN 1 TABLET: 5; 325 TABLET ORAL at 20:07

## 2019-01-01 RX ADMIN — ATORVASTATIN CALCIUM 40 MG: 40 TABLET, FILM COATED ORAL at 21:39

## 2019-01-01 RX ADMIN — APIXABAN 5 MG: 5 TABLET, FILM COATED ORAL at 08:29

## 2019-01-01 RX ADMIN — FUROSEMIDE 40 MG: 40 TABLET ORAL at 07:42

## 2019-01-01 RX ADMIN — TIZANIDINE 4 MG: 4 TABLET ORAL at 19:53

## 2019-01-01 RX ADMIN — APIXABAN 5 MG: 5 TABLET, FILM COATED ORAL at 19:53

## 2019-01-01 RX ADMIN — HYDROCODONE BITARTRATE AND ACETAMINOPHEN 1 TABLET: 5; 325 TABLET ORAL at 08:33

## 2019-01-01 RX ADMIN — CEFUROXIME AXETIL 500 MG: 250 TABLET ORAL at 20:47

## 2019-01-01 RX ADMIN — APIXABAN 5 MG: 5 TABLET, FILM COATED ORAL at 09:23

## 2019-01-01 RX ADMIN — FAMOTIDINE 20 MG: 20 TABLET ORAL at 20:00

## 2019-01-01 RX ADMIN — FUROSEMIDE 40 MG: 40 TABLET ORAL at 19:25

## 2019-01-01 RX ADMIN — Medication 10 ML: at 20:30

## 2019-01-01 RX ADMIN — LISINOPRIL 20 MG: 20 TABLET ORAL at 07:56

## 2019-01-01 RX ADMIN — APIXABAN 5 MG: 5 TABLET, FILM COATED ORAL at 20:07

## 2019-01-01 RX ADMIN — FUROSEMIDE 40 MG: 10 INJECTION, SOLUTION INTRAMUSCULAR; INTRAVENOUS at 17:38

## 2019-01-01 RX ADMIN — LISINOPRIL 20 MG: 20 TABLET ORAL at 08:51

## 2019-01-01 RX ADMIN — IPRATROPIUM BROMIDE AND ALBUTEROL SULFATE 1 AMPULE: .5; 3 SOLUTION RESPIRATORY (INHALATION) at 15:59

## 2019-01-01 RX ADMIN — FAMOTIDINE 20 MG: 20 TABLET ORAL at 09:23

## 2019-01-01 RX ADMIN — MUPIROCIN: 20 OINTMENT TOPICAL at 09:35

## 2019-01-01 RX ADMIN — HYDROCODONE BITARTRATE AND ACETAMINOPHEN 1 TABLET: 5; 325 TABLET ORAL at 08:13

## 2019-01-01 RX ADMIN — HYDROCODONE BITARTRATE AND ACETAMINOPHEN 1 TABLET: 5; 325 TABLET ORAL at 20:37

## 2019-01-01 RX ADMIN — APIXABAN 5 MG: 5 TABLET, FILM COATED ORAL at 20:41

## 2019-01-01 RX ADMIN — IOPAMIDOL 75 ML: 755 INJECTION, SOLUTION INTRAVENOUS at 10:04

## 2019-01-01 RX ADMIN — BUPROPION HYDROCHLORIDE 150 MG: 150 TABLET, FILM COATED, EXTENDED RELEASE ORAL at 09:20

## 2019-01-01 RX ADMIN — Medication 10 ML: at 09:20

## 2019-01-01 RX ADMIN — BUPROPION HYDROCHLORIDE 150 MG: 150 TABLET, FILM COATED, EXTENDED RELEASE ORAL at 09:08

## 2019-01-01 RX ADMIN — INSULIN LISPRO 1 UNITS: 100 INJECTION, SOLUTION INTRAVENOUS; SUBCUTANEOUS at 19:57

## 2019-01-01 RX ADMIN — MAGNESIUM SULFATE HEPTAHYDRATE 1 G: 1 INJECTION, SOLUTION INTRAVENOUS at 09:09

## 2019-01-01 RX ADMIN — FAMOTIDINE 20 MG: 20 TABLET ORAL at 09:08

## 2019-01-01 RX ADMIN — MUPIROCIN: 20 OINTMENT TOPICAL at 19:58

## 2019-01-01 RX ADMIN — INSULIN LISPRO 1 UNITS: 100 INJECTION, SOLUTION INTRAVENOUS; SUBCUTANEOUS at 06:20

## 2019-01-01 RX ADMIN — APIXABAN 5 MG: 5 TABLET, FILM COATED ORAL at 09:11

## 2019-01-01 RX ADMIN — FUROSEMIDE 40 MG: 40 TABLET ORAL at 14:29

## 2019-01-01 RX ADMIN — IPRATROPIUM BROMIDE AND ALBUTEROL SULFATE 1 AMPULE: .5; 3 SOLUTION RESPIRATORY (INHALATION) at 19:44

## 2019-01-01 RX ADMIN — TIZANIDINE 4 MG: 4 TABLET ORAL at 20:40

## 2019-01-01 RX ADMIN — INSULIN LISPRO 1 UNITS: 100 INJECTION, SOLUTION INTRAVENOUS; SUBCUTANEOUS at 11:59

## 2019-01-01 RX ADMIN — BUPROPION HYDROCHLORIDE 150 MG: 150 TABLET, FILM COATED, EXTENDED RELEASE ORAL at 07:51

## 2019-01-01 RX ADMIN — LISINOPRIL 20 MG: 20 TABLET ORAL at 08:13

## 2019-01-01 RX ADMIN — IPRATROPIUM BROMIDE AND ALBUTEROL SULFATE 1 AMPULE: .5; 3 SOLUTION RESPIRATORY (INHALATION) at 20:30

## 2019-01-01 RX ADMIN — INSULIN LISPRO 1 UNITS: 100 INJECTION, SOLUTION INTRAVENOUS; SUBCUTANEOUS at 13:31

## 2019-01-01 RX ADMIN — INSULIN LISPRO 1 UNITS: 100 INJECTION, SOLUTION INTRAVENOUS; SUBCUTANEOUS at 16:17

## 2019-01-01 RX ADMIN — IPRATROPIUM BROMIDE AND ALBUTEROL SULFATE 1 AMPULE: .5; 3 SOLUTION RESPIRATORY (INHALATION) at 12:20

## 2019-01-01 RX ADMIN — TIZANIDINE 4 MG: 4 TABLET ORAL at 20:19

## 2019-01-01 RX ADMIN — Medication 10 ML: at 08:20

## 2019-01-01 RX ADMIN — APIXABAN 5 MG: 5 TABLET, FILM COATED ORAL at 21:07

## 2019-01-01 RX ADMIN — SODIUM CHLORIDE: 9 INJECTION, SOLUTION INTRAVENOUS at 06:28

## 2019-01-01 RX ADMIN — BUPROPION HYDROCHLORIDE 150 MG: 150 TABLET, FILM COATED, EXTENDED RELEASE ORAL at 08:51

## 2019-01-01 RX ADMIN — Medication 10 ML: at 21:38

## 2019-01-01 RX ADMIN — IPRATROPIUM BROMIDE AND ALBUTEROL SULFATE 1 AMPULE: .5; 3 SOLUTION RESPIRATORY (INHALATION) at 15:46

## 2019-01-01 RX ADMIN — Medication 10 ML: at 19:58

## 2019-01-01 RX ADMIN — IPRATROPIUM BROMIDE AND ALBUTEROL SULFATE 1 AMPULE: .5; 3 SOLUTION RESPIRATORY (INHALATION) at 20:51

## 2019-01-01 RX ADMIN — FAMOTIDINE 20 MG: 20 TABLET ORAL at 08:50

## 2019-01-01 RX ADMIN — TIZANIDINE 4 MG: 4 TABLET ORAL at 20:36

## 2019-01-01 RX ADMIN — INSULIN LISPRO 1 UNITS: 100 INJECTION, SOLUTION INTRAVENOUS; SUBCUTANEOUS at 20:21

## 2019-01-01 RX ADMIN — TIZANIDINE 4 MG: 4 TABLET ORAL at 21:37

## 2019-01-01 RX ADMIN — BUPROPION HYDROCHLORIDE 150 MG: 150 TABLET, FILM COATED, EXTENDED RELEASE ORAL at 09:15

## 2019-01-01 RX ADMIN — INSULIN LISPRO 1 UNITS: 100 INJECTION, SOLUTION INTRAVENOUS; SUBCUTANEOUS at 21:04

## 2019-01-01 RX ADMIN — BUPROPION HYDROCHLORIDE 150 MG: 150 TABLET, FILM COATED, EXTENDED RELEASE ORAL at 07:42

## 2019-01-01 RX ADMIN — IPRATROPIUM BROMIDE AND ALBUTEROL SULFATE 1 AMPULE: .5; 3 SOLUTION RESPIRATORY (INHALATION) at 07:37

## 2019-01-01 RX ADMIN — LISINOPRIL 20 MG: 20 TABLET ORAL at 08:19

## 2019-01-01 RX ADMIN — FUROSEMIDE 40 MG: 40 TABLET ORAL at 08:13

## 2019-01-01 RX ADMIN — INSULIN LISPRO 1 UNITS: 100 INJECTION, SOLUTION INTRAVENOUS; SUBCUTANEOUS at 06:33

## 2019-01-01 RX ADMIN — APIXABAN 5 MG: 5 TABLET, FILM COATED ORAL at 20:33

## 2019-01-01 RX ADMIN — MIDAZOLAM 1 MG: 1 INJECTION INTRAMUSCULAR; INTRAVENOUS at 12:32

## 2019-01-01 RX ADMIN — FAMOTIDINE 20 MG: 20 TABLET ORAL at 09:48

## 2019-01-01 RX ADMIN — FAMOTIDINE 20 MG: 20 TABLET ORAL at 08:29

## 2019-01-01 RX ADMIN — IPRATROPIUM BROMIDE AND ALBUTEROL SULFATE 1 AMPULE: .5; 3 SOLUTION RESPIRATORY (INHALATION) at 20:55

## 2019-01-01 RX ADMIN — IPRATROPIUM BROMIDE AND ALBUTEROL SULFATE 1 AMPULE: .5; 3 SOLUTION RESPIRATORY (INHALATION) at 07:48

## 2019-01-01 RX ADMIN — HYDROCODONE BITARTRATE AND ACETAMINOPHEN 1 TABLET: 5; 325 TABLET ORAL at 09:15

## 2019-01-01 RX ADMIN — TIZANIDINE 4 MG: 4 TABLET ORAL at 21:27

## 2019-01-01 RX ADMIN — IPRATROPIUM BROMIDE AND ALBUTEROL SULFATE 1 AMPULE: .5; 3 SOLUTION RESPIRATORY (INHALATION) at 11:21

## 2019-01-01 RX ADMIN — IPRATROPIUM BROMIDE AND ALBUTEROL SULFATE 1 AMPULE: .5; 3 SOLUTION RESPIRATORY (INHALATION) at 19:27

## 2019-01-01 RX ADMIN — APIXABAN 5 MG: 5 TABLET, FILM COATED ORAL at 19:57

## 2019-01-01 RX ADMIN — INSULIN LISPRO 1 UNITS: 100 INJECTION, SOLUTION INTRAVENOUS; SUBCUTANEOUS at 21:44

## 2019-01-01 RX ADMIN — TIZANIDINE 4 MG: 4 TABLET ORAL at 20:39

## 2019-01-01 RX ADMIN — INSULIN LISPRO 2 UNITS: 100 INJECTION, SOLUTION INTRAVENOUS; SUBCUTANEOUS at 09:09

## 2019-01-01 RX ADMIN — HYDROCODONE BITARTRATE AND ACETAMINOPHEN 1 TABLET: 5; 325 TABLET ORAL at 20:20

## 2019-01-01 RX ADMIN — FAMOTIDINE 20 MG: 20 TABLET ORAL at 08:14

## 2019-01-01 RX ADMIN — IPRATROPIUM BROMIDE AND ALBUTEROL SULFATE 1 AMPULE: .5; 3 SOLUTION RESPIRATORY (INHALATION) at 19:57

## 2019-01-01 RX ADMIN — Medication 10 ML: at 20:37

## 2019-01-01 RX ADMIN — FAMOTIDINE 20 MG: 20 TABLET ORAL at 08:13

## 2019-01-01 RX ADMIN — FUROSEMIDE 40 MG: 40 TABLET ORAL at 17:38

## 2019-01-01 RX ADMIN — HYDROCODONE BITARTRATE AND ACETAMINOPHEN 1 TABLET: 5; 325 TABLET ORAL at 20:39

## 2019-01-01 RX ADMIN — INSULIN LISPRO 1 UNITS: 100 INJECTION, SOLUTION INTRAVENOUS; SUBCUTANEOUS at 17:03

## 2019-01-01 RX ADMIN — IPRATROPIUM BROMIDE AND ALBUTEROL SULFATE 1 AMPULE: .5; 3 SOLUTION RESPIRATORY (INHALATION) at 04:29

## 2019-01-01 RX ADMIN — INSULIN LISPRO 1 UNITS: 100 INJECTION, SOLUTION INTRAVENOUS; SUBCUTANEOUS at 07:01

## 2019-01-01 RX ADMIN — ATORVASTATIN CALCIUM 40 MG: 40 TABLET, FILM COATED ORAL at 21:56

## 2019-01-01 RX ADMIN — INSULIN LISPRO 1 UNITS: 100 INJECTION, SOLUTION INTRAVENOUS; SUBCUTANEOUS at 17:52

## 2019-01-01 RX ADMIN — IPRATROPIUM BROMIDE AND ALBUTEROL SULFATE 1 AMPULE: .5; 3 SOLUTION RESPIRATORY (INHALATION) at 11:32

## 2019-01-01 RX ADMIN — TIZANIDINE 4 MG: 4 TABLET ORAL at 21:57

## 2019-01-01 RX ADMIN — FUROSEMIDE 40 MG: 40 TABLET ORAL at 10:34

## 2019-01-01 RX ADMIN — INSULIN LISPRO 1 UNITS: 100 INJECTION, SOLUTION INTRAVENOUS; SUBCUTANEOUS at 08:37

## 2019-01-01 RX ADMIN — LISINOPRIL 20 MG: 20 TABLET ORAL at 07:42

## 2019-01-01 RX ADMIN — BUPROPION HYDROCHLORIDE 150 MG: 150 TABLET, FILM COATED, EXTENDED RELEASE ORAL at 08:03

## 2019-01-01 RX ADMIN — APIXABAN 10 MG: 5 TABLET, FILM COATED ORAL at 21:35

## 2019-01-01 RX ADMIN — CEFUROXIME AXETIL 500 MG: 250 TABLET ORAL at 21:37

## 2019-01-01 RX ADMIN — IPRATROPIUM BROMIDE AND ALBUTEROL SULFATE 1 AMPULE: .5; 3 SOLUTION RESPIRATORY (INHALATION) at 15:43

## 2019-01-01 RX ADMIN — Medication 10 ML: at 10:35

## 2019-01-01 RX ADMIN — BUPROPION HYDROCHLORIDE 150 MG: 150 TABLET, FILM COATED, EXTENDED RELEASE ORAL at 10:34

## 2019-01-01 RX ADMIN — BUPROPION HYDROCHLORIDE 150 MG: 150 TABLET, FILM COATED, EXTENDED RELEASE ORAL at 08:35

## 2019-01-01 RX ADMIN — Medication 10 ML: at 09:32

## 2019-01-01 RX ADMIN — INSULIN LISPRO 1 UNITS: 100 INJECTION, SOLUTION INTRAVENOUS; SUBCUTANEOUS at 17:48

## 2019-01-01 RX ADMIN — IPRATROPIUM BROMIDE AND ALBUTEROL SULFATE 1 AMPULE: .5; 3 SOLUTION RESPIRATORY (INHALATION) at 12:26

## 2019-01-01 RX ADMIN — CEFTRIAXONE SODIUM 1 G: 1 INJECTION, POWDER, FOR SOLUTION INTRAMUSCULAR; INTRAVENOUS at 15:14

## 2019-01-01 RX ADMIN — FAMOTIDINE 20 MG: 20 TABLET ORAL at 20:41

## 2019-01-01 RX ADMIN — FUROSEMIDE 40 MG: 40 TABLET ORAL at 10:31

## 2019-01-01 RX ADMIN — HYDROCODONE BITARTRATE AND ACETAMINOPHEN 1 TABLET: 5; 325 TABLET ORAL at 21:03

## 2019-01-01 RX ADMIN — DOXYCYCLINE 100 MG: 100 INJECTION, POWDER, LYOPHILIZED, FOR SOLUTION INTRAVENOUS at 18:31

## 2019-01-01 RX ADMIN — CEFTRIAXONE SODIUM 1 G: 1 INJECTION, POWDER, FOR SOLUTION INTRAMUSCULAR; INTRAVENOUS at 14:34

## 2019-01-01 RX ADMIN — FAMOTIDINE 20 MG: 20 TABLET ORAL at 10:34

## 2019-01-01 RX ADMIN — Medication 10 ML: at 09:11

## 2019-01-01 RX ADMIN — FAMOTIDINE 20 MG: 20 TABLET ORAL at 21:27

## 2019-01-01 RX ADMIN — FENOFIBRATE 160 MG: 160 TABLET ORAL at 08:53

## 2019-01-01 RX ADMIN — LISINOPRIL 20 MG: 20 TABLET ORAL at 10:34

## 2019-01-01 RX ADMIN — FAMOTIDINE 20 MG: 20 TABLET ORAL at 08:04

## 2019-01-01 RX ADMIN — BUPROPION HYDROCHLORIDE 150 MG: 150 TABLET, FILM COATED, EXTENDED RELEASE ORAL at 08:19

## 2019-01-01 RX ADMIN — Medication 10 ML: at 09:10

## 2019-01-01 RX ADMIN — ATORVASTATIN CALCIUM 40 MG: 40 TABLET, FILM COATED ORAL at 19:53

## 2019-01-01 RX ADMIN — ATORVASTATIN CALCIUM 40 MG: 10 TABLET, FILM COATED ORAL at 21:04

## 2019-01-01 RX ADMIN — APIXABAN 10 MG: 5 TABLET, FILM COATED ORAL at 20:30

## 2019-01-01 RX ADMIN — FUROSEMIDE 40 MG: 40 TABLET ORAL at 09:48

## 2019-01-01 RX ADMIN — APIXABAN 5 MG: 5 TABLET, FILM COATED ORAL at 08:28

## 2019-01-01 RX ADMIN — SODIUM CHLORIDE 1983 ML: 9 INJECTION, SOLUTION INTRAVENOUS at 15:14

## 2019-01-01 RX ADMIN — Medication 10 ML: at 22:05

## 2019-01-01 RX ADMIN — DOXYCYCLINE 100 MG: 100 INJECTION, POWDER, LYOPHILIZED, FOR SOLUTION INTRAVENOUS at 06:22

## 2019-01-01 RX ADMIN — APIXABAN 5 MG: 5 TABLET, FILM COATED ORAL at 09:47

## 2019-01-01 RX ADMIN — IPRATROPIUM BROMIDE AND ALBUTEROL SULFATE 1 AMPULE: .5; 3 SOLUTION RESPIRATORY (INHALATION) at 23:29

## 2019-01-01 RX ADMIN — APIXABAN 10 MG: 5 TABLET, FILM COATED ORAL at 09:23

## 2019-01-01 RX ADMIN — POTASSIUM CHLORIDE 10 MEQ: 10 INJECTION, SOLUTION INTRAVENOUS at 22:55

## 2019-01-01 RX ADMIN — FUROSEMIDE 40 MG: 10 INJECTION, SOLUTION INTRAMUSCULAR; INTRAVENOUS at 17:57

## 2019-01-01 RX ADMIN — POTASSIUM CHLORIDE 10 MEQ: 10 INJECTION, SOLUTION INTRAVENOUS at 21:28

## 2019-01-01 RX ADMIN — APIXABAN 5 MG: 5 TABLET, FILM COATED ORAL at 09:20

## 2019-01-01 RX ADMIN — Medication 10 ML: at 09:24

## 2019-01-01 RX ADMIN — HYDROCODONE BITARTRATE AND ACETAMINOPHEN 1 TABLET: 5; 325 TABLET ORAL at 19:23

## 2019-01-01 RX ADMIN — APIXABAN 5 MG: 5 TABLET, FILM COATED ORAL at 09:16

## 2019-01-01 RX ADMIN — HYDROCODONE BITARTRATE AND ACETAMINOPHEN 1 TABLET: 5; 325 TABLET ORAL at 08:45

## 2019-01-01 RX ADMIN — HYDROCODONE BITARTRATE AND ACETAMINOPHEN 1 TABLET: 5; 325 TABLET ORAL at 20:47

## 2019-01-01 RX ADMIN — IPRATROPIUM BROMIDE AND ALBUTEROL SULFATE 1 AMPULE: .5; 3 SOLUTION RESPIRATORY (INHALATION) at 16:19

## 2019-01-01 RX ADMIN — FAMOTIDINE 20 MG: 20 TABLET ORAL at 20:21

## 2019-01-01 RX ADMIN — LISINOPRIL 20 MG: 20 TABLET ORAL at 08:29

## 2019-01-01 RX ADMIN — MORPHINE SULFATE 2 MG: 2 INJECTION, SOLUTION INTRAMUSCULAR; INTRAVENOUS at 10:46

## 2019-01-01 RX ADMIN — APIXABAN 5 MG: 5 TABLET, FILM COATED ORAL at 21:55

## 2019-01-01 RX ADMIN — BUPROPION HYDROCHLORIDE 150 MG: 150 TABLET, FILM COATED, EXTENDED RELEASE ORAL at 08:14

## 2019-01-01 RX ADMIN — TIZANIDINE 4 MG: 4 TABLET ORAL at 20:07

## 2019-01-01 RX ADMIN — FAMOTIDINE 20 MG: 20 TABLET ORAL at 20:47

## 2019-01-01 RX ADMIN — FUROSEMIDE 40 MG: 40 TABLET ORAL at 09:38

## 2019-01-01 RX ADMIN — APIXABAN 5 MG: 5 TABLET, FILM COATED ORAL at 20:39

## 2019-01-01 RX ADMIN — HYDROCODONE BITARTRATE AND ACETAMINOPHEN 1 TABLET: 5; 325 TABLET ORAL at 14:43

## 2019-01-01 RX ADMIN — IPRATROPIUM BROMIDE AND ALBUTEROL SULFATE 1 AMPULE: .5; 3 SOLUTION RESPIRATORY (INHALATION) at 23:20

## 2019-01-01 RX ADMIN — HYDROCODONE BITARTRATE AND ACETAMINOPHEN 1 TABLET: 5; 325 TABLET ORAL at 21:02

## 2019-01-01 RX ADMIN — IPRATROPIUM BROMIDE AND ALBUTEROL SULFATE 1 AMPULE: .5; 3 SOLUTION RESPIRATORY (INHALATION) at 15:48

## 2019-01-01 RX ADMIN — FAMOTIDINE 20 MG: 20 TABLET ORAL at 08:20

## 2019-01-01 RX ADMIN — INSULIN LISPRO 1 UNITS: 100 INJECTION, SOLUTION INTRAVENOUS; SUBCUTANEOUS at 20:56

## 2019-01-01 RX ADMIN — IPRATROPIUM BROMIDE AND ALBUTEROL SULFATE 1 AMPULE: .5; 3 SOLUTION RESPIRATORY (INHALATION) at 07:47

## 2019-01-01 RX ADMIN — INSULIN LISPRO 1 UNITS: 100 INJECTION, SOLUTION INTRAVENOUS; SUBCUTANEOUS at 20:12

## 2019-01-01 RX ADMIN — HYDROCODONE BITARTRATE AND ACETAMINOPHEN 1 TABLET: 5; 325 TABLET ORAL at 15:58

## 2019-01-01 RX ADMIN — HYDROCODONE BITARTRATE AND ACETAMINOPHEN 1 TABLET: 5; 325 TABLET ORAL at 17:03

## 2019-01-01 RX ADMIN — BUPROPION HYDROCHLORIDE 150 MG: 150 TABLET, FILM COATED, EXTENDED RELEASE ORAL at 09:10

## 2019-01-01 RX ADMIN — IPRATROPIUM BROMIDE AND ALBUTEROL SULFATE 1 AMPULE: .5; 3 SOLUTION RESPIRATORY (INHALATION) at 11:58

## 2019-01-01 RX ADMIN — HYDROCODONE BITARTRATE AND ACETAMINOPHEN 1 TABLET: 5; 325 TABLET ORAL at 05:02

## 2019-01-01 RX ADMIN — LISINOPRIL 20 MG: 20 TABLET ORAL at 08:36

## 2019-01-01 RX ADMIN — HYDROCODONE BITARTRATE AND ACETAMINOPHEN 1 TABLET: 5; 325 TABLET ORAL at 21:06

## 2019-01-01 RX ADMIN — HYDROCODONE BITARTRATE AND ACETAMINOPHEN 1 TABLET: 5; 325 TABLET ORAL at 08:36

## 2019-01-01 RX ADMIN — HYDROCODONE BITARTRATE AND ACETAMINOPHEN 1 TABLET: 5; 325 TABLET ORAL at 19:49

## 2019-01-01 RX ADMIN — FUROSEMIDE 20 MG: 10 INJECTION, SOLUTION INTRAMUSCULAR; INTRAVENOUS at 11:55

## 2019-01-01 RX ADMIN — IPRATROPIUM BROMIDE AND ALBUTEROL SULFATE 1 AMPULE: .5; 3 SOLUTION RESPIRATORY (INHALATION) at 19:11

## 2019-01-01 RX ADMIN — IPRATROPIUM BROMIDE AND ALBUTEROL SULFATE 1 AMPULE: .5; 3 SOLUTION RESPIRATORY (INHALATION) at 11:50

## 2019-01-01 RX ADMIN — FAMOTIDINE 20 MG: 20 TABLET ORAL at 21:37

## 2019-01-01 RX ADMIN — HYDROCODONE BITARTRATE AND ACETAMINOPHEN 1 TABLET: 5; 325 TABLET ORAL at 10:07

## 2019-01-01 RX ADMIN — IPRATROPIUM BROMIDE AND ALBUTEROL SULFATE 1 AMPULE: .5; 3 SOLUTION RESPIRATORY (INHALATION) at 07:20

## 2019-01-01 RX ADMIN — FUROSEMIDE 40 MG: 10 INJECTION, SOLUTION INTRAMUSCULAR; INTRAVENOUS at 18:55

## 2019-01-01 RX ADMIN — TIZANIDINE 4 MG: 4 TABLET ORAL at 20:00

## 2019-01-01 RX ADMIN — HYDROCODONE BITARTRATE AND ACETAMINOPHEN 1 TABLET: 5; 325 TABLET ORAL at 09:08

## 2019-01-01 RX ADMIN — FUROSEMIDE 40 MG: 40 TABLET ORAL at 15:37

## 2019-01-01 RX ADMIN — LISINOPRIL 20 MG: 20 TABLET ORAL at 09:08

## 2019-01-01 RX ADMIN — APIXABAN 10 MG: 5 TABLET, FILM COATED ORAL at 09:09

## 2019-01-01 RX ADMIN — LISINOPRIL 20 MG: 20 TABLET ORAL at 08:35

## 2019-01-01 RX ADMIN — IPRATROPIUM BROMIDE AND ALBUTEROL SULFATE 1 AMPULE: .5; 3 SOLUTION RESPIRATORY (INHALATION) at 15:10

## 2019-01-01 RX ADMIN — IPRATROPIUM BROMIDE AND ALBUTEROL SULFATE 1 AMPULE: .5; 3 SOLUTION RESPIRATORY (INHALATION) at 12:39

## 2019-01-01 RX ADMIN — ATORVASTATIN CALCIUM 40 MG: 10 TABLET, FILM COATED ORAL at 21:00

## 2019-01-01 RX ADMIN — IPRATROPIUM BROMIDE AND ALBUTEROL SULFATE 1 AMPULE: .5; 3 SOLUTION RESPIRATORY (INHALATION) at 16:16

## 2019-01-01 RX ADMIN — FAMOTIDINE 20 MG: 20 TABLET ORAL at 07:50

## 2019-01-01 RX ADMIN — FAMOTIDINE 20 MG: 20 TABLET ORAL at 21:05

## 2019-01-01 RX ADMIN — LISINOPRIL 20 MG: 20 TABLET ORAL at 09:10

## 2019-01-01 RX ADMIN — FAMOTIDINE 20 MG: 20 TABLET ORAL at 09:16

## 2019-01-01 RX ADMIN — FAMOTIDINE 20 MG: 20 TABLET ORAL at 21:39

## 2019-01-01 RX ADMIN — HYDROCODONE BITARTRATE AND ACETAMINOPHEN 1 TABLET: 5; 325 TABLET ORAL at 11:43

## 2019-01-01 RX ADMIN — HYDROCODONE BITARTRATE AND ACETAMINOPHEN 1 TABLET: 5; 325 TABLET ORAL at 08:50

## 2019-01-01 RX ADMIN — FUROSEMIDE 40 MG: 40 TABLET ORAL at 17:45

## 2019-01-01 RX ADMIN — IPRATROPIUM BROMIDE AND ALBUTEROL SULFATE 1 AMPULE: .5; 3 SOLUTION RESPIRATORY (INHALATION) at 15:23

## 2019-01-01 RX ADMIN — HYDROCODONE BITARTRATE AND ACETAMINOPHEN 1 TABLET: 5; 325 TABLET ORAL at 07:42

## 2019-01-01 RX ADMIN — FAMOTIDINE 20 MG: 20 TABLET ORAL at 08:57

## 2019-01-01 RX ADMIN — TIZANIDINE 4 MG: 4 TABLET ORAL at 20:55

## 2019-01-01 RX ADMIN — FUROSEMIDE 40 MG: 10 INJECTION, SOLUTION INTRAMUSCULAR; INTRAVENOUS at 09:24

## 2019-01-01 RX ADMIN — HYDROCODONE BITARTRATE AND ACETAMINOPHEN 1 TABLET: 5; 325 TABLET ORAL at 13:38

## 2019-01-01 RX ADMIN — TIZANIDINE 4 MG: 4 TABLET ORAL at 20:20

## 2019-01-01 RX ADMIN — APIXABAN 5 MG: 5 TABLET, FILM COATED ORAL at 08:04

## 2019-01-01 RX ADMIN — INSULIN LISPRO 1 UNITS: 100 INJECTION, SOLUTION INTRAVENOUS; SUBCUTANEOUS at 21:38

## 2019-01-01 RX ADMIN — SODIUM CHLORIDE: 9 INJECTION, SOLUTION INTRAVENOUS at 12:25

## 2019-01-01 RX ADMIN — INSULIN LISPRO 1 UNITS: 100 INJECTION, SOLUTION INTRAVENOUS; SUBCUTANEOUS at 11:55

## 2019-01-01 RX ADMIN — HYDROCODONE BITARTRATE AND ACETAMINOPHEN 1 TABLET: 5; 325 TABLET ORAL at 20:21

## 2019-01-01 RX ADMIN — IPRATROPIUM BROMIDE AND ALBUTEROL SULFATE 1 AMPULE: .5; 3 SOLUTION RESPIRATORY (INHALATION) at 08:37

## 2019-01-01 RX ADMIN — TIZANIDINE 4 MG: 4 TABLET ORAL at 21:39

## 2019-01-01 RX ADMIN — ATORVASTATIN CALCIUM 40 MG: 40 TABLET, FILM COATED ORAL at 21:02

## 2019-01-01 RX ADMIN — HYDROCODONE BITARTRATE AND ACETAMINOPHEN 1 TABLET: 5; 325 TABLET ORAL at 19:48

## 2019-01-01 RX ADMIN — INSULIN LISPRO 1 UNITS: 100 INJECTION, SOLUTION INTRAVENOUS; SUBCUTANEOUS at 09:11

## 2019-01-01 RX ADMIN — APIXABAN 10 MG: 5 TABLET, FILM COATED ORAL at 10:02

## 2019-01-01 RX ADMIN — LISINOPRIL 20 MG: 20 TABLET ORAL at 10:07

## 2019-01-01 RX ADMIN — FUROSEMIDE 40 MG: 40 TABLET ORAL at 16:40

## 2019-01-01 RX ADMIN — IPRATROPIUM BROMIDE AND ALBUTEROL SULFATE 1 AMPULE: .5; 3 SOLUTION RESPIRATORY (INHALATION) at 19:37

## 2019-01-01 RX ADMIN — APIXABAN 5 MG: 5 TABLET, FILM COATED ORAL at 08:35

## 2019-01-01 RX ADMIN — APIXABAN 5 MG: 5 TABLET, FILM COATED ORAL at 19:49

## 2019-01-01 RX ADMIN — HYDROCODONE BITARTRATE AND ACETAMINOPHEN 1 TABLET: 5; 325 TABLET ORAL at 08:35

## 2019-01-01 RX ADMIN — INSULIN LISPRO 1 UNITS: 100 INJECTION, SOLUTION INTRAVENOUS; SUBCUTANEOUS at 08:33

## 2019-01-01 RX ADMIN — CEFUROXIME AXETIL 500 MG: 250 TABLET ORAL at 11:55

## 2019-01-01 RX ADMIN — TIZANIDINE 4 MG: 4 TABLET ORAL at 21:02

## 2019-01-01 RX ADMIN — ENOXAPARIN SODIUM 40 MG: 40 INJECTION SUBCUTANEOUS at 17:03

## 2019-01-01 RX ADMIN — HYDROCODONE BITARTRATE AND ACETAMINOPHEN 1 TABLET: 5; 325 TABLET ORAL at 09:20

## 2019-01-01 RX ADMIN — INSULIN LISPRO 1 UNITS: 100 INJECTION, SOLUTION INTRAVENOUS; SUBCUTANEOUS at 16:18

## 2019-01-01 RX ADMIN — FUROSEMIDE 40 MG: 40 TABLET ORAL at 08:29

## 2019-01-01 RX ADMIN — FAMOTIDINE 20 MG: 20 TABLET ORAL at 21:56

## 2019-01-01 RX ADMIN — FUROSEMIDE 40 MG: 10 INJECTION, SOLUTION INTRAMUSCULAR; INTRAVENOUS at 16:02

## 2019-01-01 RX ADMIN — BUPROPION HYDROCHLORIDE 150 MG: 150 TABLET, FILM COATED, EXTENDED RELEASE ORAL at 08:13

## 2019-01-01 RX ADMIN — FAMOTIDINE 20 MG: 20 TABLET ORAL at 20:20

## 2019-01-01 RX ADMIN — FAMOTIDINE 20 MG: 20 TABLET ORAL at 07:42

## 2019-01-01 RX ADMIN — HYDROCODONE BITARTRATE AND ACETAMINOPHEN 1 TABLET: 5; 325 TABLET ORAL at 09:38

## 2019-01-01 RX ADMIN — Medication 10 ML: at 09:09

## 2019-01-01 RX ADMIN — IPRATROPIUM BROMIDE AND ALBUTEROL SULFATE 1 AMPULE: .5; 3 SOLUTION RESPIRATORY (INHALATION) at 12:35

## 2019-01-01 RX ADMIN — INSULIN LISPRO 1 UNITS: 100 INJECTION, SOLUTION INTRAVENOUS; SUBCUTANEOUS at 11:49

## 2019-01-01 RX ADMIN — IPRATROPIUM BROMIDE AND ALBUTEROL SULFATE 1 AMPULE: .5; 3 SOLUTION RESPIRATORY (INHALATION) at 15:45

## 2019-01-01 RX ADMIN — INSULIN LISPRO 1 UNITS: 100 INJECTION, SOLUTION INTRAVENOUS; SUBCUTANEOUS at 21:00

## 2019-01-01 RX ADMIN — FUROSEMIDE 40 MG: 40 TABLET ORAL at 08:36

## 2019-01-01 RX ADMIN — ATORVASTATIN CALCIUM 40 MG: 10 TABLET, FILM COATED ORAL at 20:39

## 2019-01-01 RX ADMIN — FAMOTIDINE 20 MG: 20 TABLET ORAL at 10:02

## 2019-01-01 RX ADMIN — HYDROCODONE BITARTRATE AND ACETAMINOPHEN 1 TABLET: 5; 325 TABLET ORAL at 08:03

## 2019-01-01 RX ADMIN — HYDROCODONE BITARTRATE AND ACETAMINOPHEN 1 TABLET: 5; 325 TABLET ORAL at 08:20

## 2019-01-01 RX ADMIN — HYDROCODONE BITARTRATE AND ACETAMINOPHEN 1 TABLET: 5; 325 TABLET ORAL at 19:34

## 2019-01-01 RX ADMIN — HYDROCODONE BITARTRATE AND ACETAMINOPHEN 1 TABLET: 5; 325 TABLET ORAL at 15:45

## 2019-01-01 RX ADMIN — BUPROPION HYDROCHLORIDE 150 MG: 150 TABLET, FILM COATED, EXTENDED RELEASE ORAL at 09:23

## 2019-01-01 RX ADMIN — FUROSEMIDE 40 MG: 10 INJECTION, SOLUTION INTRAMUSCULAR; INTRAVENOUS at 09:10

## 2019-01-01 RX ADMIN — HYDROCODONE BITARTRATE AND ACETAMINOPHEN 1 TABLET: 5; 325 TABLET ORAL at 20:19

## 2019-01-01 RX ADMIN — BUPROPION HYDROCHLORIDE 150 MG: 150 TABLET, FILM COATED, EXTENDED RELEASE ORAL at 08:53

## 2019-01-01 RX ADMIN — APIXABAN 5 MG: 5 TABLET, FILM COATED ORAL at 21:57

## 2019-01-01 RX ADMIN — ATORVASTATIN CALCIUM 40 MG: 40 TABLET, FILM COATED ORAL at 20:21

## 2019-01-01 RX ADMIN — IPRATROPIUM BROMIDE AND ALBUTEROL SULFATE 1 AMPULE: .5; 3 SOLUTION RESPIRATORY (INHALATION) at 03:29

## 2019-01-01 RX ADMIN — FAMOTIDINE 20 MG: 20 TABLET ORAL at 20:39

## 2019-01-01 RX ADMIN — LISINOPRIL 20 MG: 20 TABLET ORAL at 08:14

## 2019-01-01 RX ADMIN — HYDROCODONE BITARTRATE AND ACETAMINOPHEN 1 TABLET: 5; 325 TABLET ORAL at 12:52

## 2019-01-01 RX ADMIN — TIZANIDINE 4 MG: 4 TABLET ORAL at 22:20

## 2019-01-01 RX ADMIN — FUROSEMIDE 40 MG: 40 TABLET ORAL at 17:55

## 2019-01-01 RX ADMIN — IPRATROPIUM BROMIDE AND ALBUTEROL SULFATE 1 AMPULE: .5; 3 SOLUTION RESPIRATORY (INHALATION) at 12:18

## 2019-01-01 RX ADMIN — INSULIN LISPRO 1 UNITS: 100 INJECTION, SOLUTION INTRAVENOUS; SUBCUTANEOUS at 16:25

## 2019-01-01 RX ADMIN — IPRATROPIUM BROMIDE AND ALBUTEROL SULFATE 1 AMPULE: .5; 3 SOLUTION RESPIRATORY (INHALATION) at 11:53

## 2019-01-01 RX ADMIN — IPRATROPIUM BROMIDE AND ALBUTEROL SULFATE 1 AMPULE: .5; 3 SOLUTION RESPIRATORY (INHALATION) at 16:10

## 2019-01-01 RX ADMIN — LISINOPRIL 20 MG: 20 TABLET ORAL at 08:50

## 2019-01-01 RX ADMIN — ATORVASTATIN CALCIUM 40 MG: 10 TABLET, FILM COATED ORAL at 20:00

## 2019-01-01 RX ADMIN — HYDROCODONE BITARTRATE AND ACETAMINOPHEN 1 TABLET: 5; 325 TABLET ORAL at 19:00

## 2019-01-01 RX ADMIN — HYDROCODONE BITARTRATE AND ACETAMINOPHEN 1 TABLET: 5; 325 TABLET ORAL at 21:37

## 2019-01-01 RX ADMIN — APIXABAN 5 MG: 5 TABLET, FILM COATED ORAL at 10:07

## 2019-01-01 RX ADMIN — HYDROCODONE BITARTRATE AND ACETAMINOPHEN 1 TABLET: 5; 325 TABLET ORAL at 09:10

## 2019-01-01 RX ADMIN — FUROSEMIDE 40 MG: 40 TABLET ORAL at 07:56

## 2019-01-01 RX ADMIN — IPRATROPIUM BROMIDE AND ALBUTEROL SULFATE 1 AMPULE: .5; 3 SOLUTION RESPIRATORY (INHALATION) at 08:27

## 2019-01-01 RX ADMIN — APIXABAN 5 MG: 5 TABLET, FILM COATED ORAL at 20:19

## 2019-01-01 RX ADMIN — INSULIN LISPRO 1 UNITS: 100 INJECTION, SOLUTION INTRAVENOUS; SUBCUTANEOUS at 06:37

## 2019-01-01 RX ADMIN — INSULIN LISPRO 1 UNITS: 100 INJECTION, SOLUTION INTRAVENOUS; SUBCUTANEOUS at 20:37

## 2019-01-01 RX ADMIN — IPRATROPIUM BROMIDE AND ALBUTEROL SULFATE 1 AMPULE: .5; 3 SOLUTION RESPIRATORY (INHALATION) at 19:29

## 2019-01-01 RX ADMIN — IPRATROPIUM BROMIDE AND ALBUTEROL SULFATE 1 AMPULE: .5; 3 SOLUTION RESPIRATORY (INHALATION) at 15:22

## 2019-01-01 RX ADMIN — INSULIN LISPRO 1 UNITS: 100 INJECTION, SOLUTION INTRAVENOUS; SUBCUTANEOUS at 21:07

## 2019-01-01 RX ADMIN — HYDROCODONE BITARTRATE AND ACETAMINOPHEN 1 TABLET: 5; 325 TABLET ORAL at 19:53

## 2019-01-01 RX ADMIN — IPRATROPIUM BROMIDE AND ALBUTEROL SULFATE 1 AMPULE: .5; 3 SOLUTION RESPIRATORY (INHALATION) at 15:41

## 2019-01-01 RX ADMIN — FUROSEMIDE 40 MG: 40 TABLET ORAL at 16:42

## 2019-01-01 RX ADMIN — TIZANIDINE 4 MG: 4 TABLET ORAL at 21:00

## 2019-01-01 RX ADMIN — HYDROCODONE BITARTRATE AND ACETAMINOPHEN 1 TABLET: 5; 325 TABLET ORAL at 19:57

## 2019-01-01 RX ADMIN — FUROSEMIDE 40 MG: 10 INJECTION, SOLUTION INTRAMUSCULAR; INTRAVENOUS at 08:19

## 2019-01-01 RX ADMIN — INSULIN LISPRO 1 UNITS: 100 INJECTION, SOLUTION INTRAVENOUS; SUBCUTANEOUS at 06:28

## 2019-01-01 RX ADMIN — APIXABAN 5 MG: 5 TABLET, FILM COATED ORAL at 07:56

## 2019-01-01 RX ADMIN — IPRATROPIUM BROMIDE AND ALBUTEROL SULFATE 1 AMPULE: .5; 3 SOLUTION RESPIRATORY (INHALATION) at 20:29

## 2019-01-01 RX ADMIN — IPRATROPIUM BROMIDE AND ALBUTEROL SULFATE 1 AMPULE: .5; 3 SOLUTION RESPIRATORY (INHALATION) at 03:56

## 2019-01-01 RX ADMIN — INSULIN LISPRO 1 UNITS: 100 INJECTION, SOLUTION INTRAVENOUS; SUBCUTANEOUS at 19:03

## 2019-01-01 RX ADMIN — HYDROCODONE BITARTRATE AND ACETAMINOPHEN 1 TABLET: 5; 325 TABLET ORAL at 11:49

## 2019-01-01 RX ADMIN — IPRATROPIUM BROMIDE AND ALBUTEROL SULFATE 1 AMPULE: .5; 3 SOLUTION RESPIRATORY (INHALATION) at 07:43

## 2019-01-01 RX ADMIN — APIXABAN 5 MG: 5 TABLET, FILM COATED ORAL at 07:42

## 2019-01-01 RX ADMIN — INSULIN LISPRO 1 UNITS: 100 INJECTION, SOLUTION INTRAVENOUS; SUBCUTANEOUS at 20:26

## 2019-01-01 RX ADMIN — INSULIN LISPRO 1 UNITS: 100 INJECTION, SOLUTION INTRAVENOUS; SUBCUTANEOUS at 20:02

## 2019-01-01 RX ADMIN — ATORVASTATIN CALCIUM 40 MG: 10 TABLET, FILM COATED ORAL at 21:36

## 2019-01-01 RX ADMIN — BUPROPION HYDROCHLORIDE 150 MG: 150 TABLET, FILM COATED, EXTENDED RELEASE ORAL at 09:38

## 2019-01-01 RX ADMIN — APIXABAN 5 MG: 5 TABLET, FILM COATED ORAL at 20:21

## 2019-01-01 RX ADMIN — SODIUM CHLORIDE: 9 INJECTION, SOLUTION INTRAVENOUS at 17:04

## 2019-01-01 RX ADMIN — HEPARIN SODIUM AND DEXTROSE 16.5 ML/HR: 10000; 5 INJECTION INTRAVENOUS at 23:29

## 2019-01-01 RX ADMIN — HYDROCODONE BITARTRATE AND ACETAMINOPHEN 1 TABLET: 5; 325 TABLET ORAL at 08:51

## 2019-01-01 RX ADMIN — AZITHROMYCIN MONOHYDRATE 500 MG: 500 INJECTION, POWDER, LYOPHILIZED, FOR SOLUTION INTRAVENOUS at 15:19

## 2019-01-01 RX ADMIN — SODIUM CHLORIDE: 9 INJECTION, SOLUTION INTRAVENOUS at 12:21

## 2019-01-01 RX ADMIN — TIZANIDINE 4 MG: 4 TABLET ORAL at 20:33

## 2019-01-01 RX ADMIN — FUROSEMIDE 40 MG: 10 INJECTION, SOLUTION INTRAMUSCULAR; INTRAVENOUS at 09:08

## 2019-01-01 RX ADMIN — HYDROCODONE BITARTRATE AND ACETAMINOPHEN 1 TABLET: 5; 325 TABLET ORAL at 21:00

## 2019-01-01 RX ADMIN — HYDROCODONE BITARTRATE AND ACETAMINOPHEN 1 TABLET: 5; 325 TABLET ORAL at 22:06

## 2019-01-01 RX ADMIN — FUROSEMIDE 40 MG: 40 TABLET ORAL at 15:10

## 2019-01-01 RX ADMIN — APIXABAN 5 MG: 5 TABLET, FILM COATED ORAL at 20:56

## 2019-01-01 RX ADMIN — TIZANIDINE 4 MG: 4 TABLET ORAL at 21:07

## 2019-01-01 RX ADMIN — INSULIN LISPRO 1 UNITS: 100 INJECTION, SOLUTION INTRAVENOUS; SUBCUTANEOUS at 07:33

## 2019-01-01 RX ADMIN — FAMOTIDINE 20 MG: 20 TABLET ORAL at 21:07

## 2019-01-01 RX ADMIN — APIXABAN 5 MG: 5 TABLET, FILM COATED ORAL at 08:13

## 2019-01-01 RX ADMIN — FAMOTIDINE 20 MG: 20 TABLET ORAL at 20:55

## 2019-01-01 RX ADMIN — FAMOTIDINE 20 MG: 20 TABLET ORAL at 20:36

## 2019-01-01 RX ADMIN — HYDROCODONE BITARTRATE AND ACETAMINOPHEN 1 TABLET: 5; 325 TABLET ORAL at 09:48

## 2019-01-01 RX ADMIN — INSULIN LISPRO 1 UNITS: 100 INJECTION, SOLUTION INTRAVENOUS; SUBCUTANEOUS at 22:02

## 2019-01-01 RX ADMIN — IPRATROPIUM BROMIDE AND ALBUTEROL SULFATE 1 AMPULE: .5; 3 SOLUTION RESPIRATORY (INHALATION) at 08:01

## 2019-01-01 RX ADMIN — FAMOTIDINE 20 MG: 20 TABLET ORAL at 21:02

## 2019-01-01 RX ADMIN — INSULIN LISPRO 1 UNITS: 100 INJECTION, SOLUTION INTRAVENOUS; SUBCUTANEOUS at 21:34

## 2019-01-01 RX ADMIN — CEFTRIAXONE SODIUM 1 G: 1 INJECTION, POWDER, FOR SOLUTION INTRAMUSCULAR; INTRAVENOUS at 14:37

## 2019-01-01 RX ADMIN — Medication 10 ML: at 21:00

## 2019-01-01 RX ADMIN — Medication 10 ML: at 21:05

## 2019-01-01 RX ADMIN — IPRATROPIUM BROMIDE AND ALBUTEROL SULFATE 1 AMPULE: .5; 3 SOLUTION RESPIRATORY (INHALATION) at 20:42

## 2019-01-01 RX ADMIN — INSULIN LISPRO 1 UNITS: 100 INJECTION, SOLUTION INTRAVENOUS; SUBCUTANEOUS at 20:42

## 2019-01-01 RX ADMIN — BUPROPION HYDROCHLORIDE 150 MG: 150 TABLET, FILM COATED, EXTENDED RELEASE ORAL at 10:07

## 2019-01-01 RX ADMIN — FUROSEMIDE 40 MG: 40 TABLET ORAL at 08:14

## 2019-01-01 RX ADMIN — APIXABAN 5 MG: 5 TABLET, FILM COATED ORAL at 20:20

## 2019-01-01 RX ADMIN — HYDROCODONE BITARTRATE AND ACETAMINOPHEN 1 TABLET: 5; 325 TABLET ORAL at 14:37

## 2019-01-01 RX ADMIN — HYDROCODONE BITARTRATE AND ACETAMINOPHEN 1 TABLET: 5; 325 TABLET ORAL at 14:34

## 2019-01-01 RX ADMIN — INSULIN LISPRO 1 UNITS: 100 INJECTION, SOLUTION INTRAVENOUS; SUBCUTANEOUS at 09:01

## 2019-01-01 RX ADMIN — APIXABAN 10 MG: 5 TABLET, FILM COATED ORAL at 08:50

## 2019-01-01 RX ADMIN — BUPROPION HYDROCHLORIDE 150 MG: 150 TABLET, FILM COATED, EXTENDED RELEASE ORAL at 08:57

## 2019-01-01 RX ADMIN — IPRATROPIUM BROMIDE AND ALBUTEROL SULFATE 1 AMPULE: .5; 3 SOLUTION RESPIRATORY (INHALATION) at 08:59

## 2019-01-01 RX ADMIN — IPRATROPIUM BROMIDE AND ALBUTEROL SULFATE 1 AMPULE: .5; 3 SOLUTION RESPIRATORY (INHALATION) at 12:03

## 2019-01-01 RX ADMIN — HYDROCODONE BITARTRATE AND ACETAMINOPHEN 1 TABLET: 5; 325 TABLET ORAL at 20:40

## 2019-01-01 RX ADMIN — IPRATROPIUM BROMIDE AND ALBUTEROL SULFATE 1 AMPULE: .5; 3 SOLUTION RESPIRATORY (INHALATION) at 11:37

## 2019-01-01 RX ADMIN — IPRATROPIUM BROMIDE AND ALBUTEROL SULFATE 1 AMPULE: .5; 3 SOLUTION RESPIRATORY (INHALATION) at 12:16

## 2019-01-01 RX ADMIN — IPRATROPIUM BROMIDE AND ALBUTEROL SULFATE 1 AMPULE: .5; 3 SOLUTION RESPIRATORY (INHALATION) at 20:03

## 2019-01-01 RX ADMIN — APIXABAN 5 MG: 5 TABLET, FILM COATED ORAL at 21:27

## 2019-01-01 RX ADMIN — IPRATROPIUM BROMIDE AND ALBUTEROL SULFATE 1 AMPULE: .5; 3 SOLUTION RESPIRATORY (INHALATION) at 07:18

## 2019-01-01 RX ADMIN — APIXABAN 5 MG: 5 TABLET, FILM COATED ORAL at 21:02

## 2019-01-01 RX ADMIN — HYDROCODONE BITARTRATE AND ACETAMINOPHEN 1 TABLET: 5; 325 TABLET ORAL at 13:36

## 2019-01-01 RX ADMIN — LISINOPRIL 20 MG: 20 TABLET ORAL at 08:53

## 2019-01-01 RX ADMIN — INSULIN LISPRO 1 UNITS: 100 INJECTION, SOLUTION INTRAVENOUS; SUBCUTANEOUS at 11:28

## 2019-01-01 RX ADMIN — FUROSEMIDE 40 MG: 10 INJECTION, SOLUTION INTRAMUSCULAR; INTRAVENOUS at 09:20

## 2019-01-01 RX ADMIN — INSULIN LISPRO 1 UNITS: 100 INJECTION, SOLUTION INTRAVENOUS; SUBCUTANEOUS at 17:56

## 2019-01-01 RX ADMIN — HYDROCODONE BITARTRATE AND ACETAMINOPHEN 1 TABLET: 5; 325 TABLET ORAL at 09:23

## 2019-01-01 RX ADMIN — LISINOPRIL 20 MG: 20 TABLET ORAL at 09:16

## 2019-01-01 RX ADMIN — APIXABAN 5 MG: 5 TABLET, FILM COATED ORAL at 08:57

## 2019-01-01 RX ADMIN — INSULIN LISPRO 1 UNITS: 100 INJECTION, SOLUTION INTRAVENOUS; SUBCUTANEOUS at 16:46

## 2019-01-01 RX ADMIN — FAMOTIDINE 20 MG: 20 TABLET ORAL at 08:36

## 2019-01-01 RX ADMIN — HYDROCODONE BITARTRATE AND ACETAMINOPHEN 1 TABLET: 5; 325 TABLET ORAL at 14:54

## 2019-01-01 RX ADMIN — HYDROCODONE BITARTRATE AND ACETAMINOPHEN 1 TABLET: 5; 325 TABLET ORAL at 20:56

## 2019-01-01 RX ADMIN — HYDROCODONE BITARTRATE AND ACETAMINOPHEN 1 TABLET: 5; 325 TABLET ORAL at 14:09

## 2019-01-01 RX ADMIN — Medication 10 ML: at 20:39

## 2019-01-01 RX ADMIN — MIDAZOLAM 1 MG: 1 INJECTION INTRAMUSCULAR; INTRAVENOUS at 12:30

## 2019-01-01 RX ADMIN — INSULIN LISPRO 1 UNITS: 100 INJECTION, SOLUTION INTRAVENOUS; SUBCUTANEOUS at 06:54

## 2019-01-01 RX ADMIN — Medication 10 ML: at 08:47

## 2019-01-01 RX ADMIN — FAMOTIDINE 20 MG: 20 TABLET ORAL at 08:35

## 2019-01-01 RX ADMIN — FAMOTIDINE 20 MG: 20 TABLET ORAL at 09:39

## 2019-01-01 RX ADMIN — HYDROCODONE BITARTRATE AND ACETAMINOPHEN 1 TABLET: 5; 325 TABLET ORAL at 08:53

## 2019-01-01 RX ADMIN — IPRATROPIUM BROMIDE AND ALBUTEROL SULFATE 1 AMPULE: .5; 3 SOLUTION RESPIRATORY (INHALATION) at 08:36

## 2019-01-01 RX ADMIN — INSULIN LISPRO 1 UNITS: 100 INJECTION, SOLUTION INTRAVENOUS; SUBCUTANEOUS at 14:03

## 2019-01-01 RX ADMIN — IPRATROPIUM BROMIDE AND ALBUTEROL SULFATE 1 AMPULE: .5; 3 SOLUTION RESPIRATORY (INHALATION) at 08:57

## 2019-01-01 RX ADMIN — IPRATROPIUM BROMIDE AND ALBUTEROL SULFATE 1 AMPULE: .5; 3 SOLUTION RESPIRATORY (INHALATION) at 12:43

## 2019-01-01 RX ADMIN — FAMOTIDINE 20 MG: 20 TABLET ORAL at 20:07

## 2019-01-01 RX ADMIN — FAMOTIDINE 20 MG: 20 TABLET ORAL at 21:00

## 2019-01-01 RX ADMIN — FUROSEMIDE 40 MG: 40 TABLET ORAL at 14:48

## 2019-01-01 RX ADMIN — INSULIN LISPRO 1 UNITS: 100 INJECTION, SOLUTION INTRAVENOUS; SUBCUTANEOUS at 07:46

## 2019-01-01 RX ADMIN — ALTEPLASE 1 MG/HR: 2.2 INJECTION, POWDER, LYOPHILIZED, FOR SOLUTION INTRAVENOUS at 13:13

## 2019-01-01 RX ADMIN — APIXABAN 5 MG: 5 TABLET, FILM COATED ORAL at 13:31

## 2019-01-01 RX ADMIN — LISINOPRIL 20 MG: 20 TABLET ORAL at 08:04

## 2019-01-01 RX ADMIN — ATORVASTATIN CALCIUM 40 MG: 40 TABLET, FILM COATED ORAL at 20:33

## 2019-01-01 RX ADMIN — ATORVASTATIN CALCIUM 40 MG: 10 TABLET, FILM COATED ORAL at 20:46

## 2019-01-01 RX ADMIN — IPRATROPIUM BROMIDE AND ALBUTEROL SULFATE 1 AMPULE: .5; 3 SOLUTION RESPIRATORY (INHALATION) at 07:56

## 2019-01-01 RX ADMIN — IPRATROPIUM BROMIDE AND ALBUTEROL SULFATE 1 AMPULE: .5; 3 SOLUTION RESPIRATORY (INHALATION) at 20:43

## 2019-01-01 RX ADMIN — Medication 10 ML: at 22:10

## 2019-01-01 RX ADMIN — IPRATROPIUM BROMIDE AND ALBUTEROL SULFATE 1 AMPULE: .5; 3 SOLUTION RESPIRATORY (INHALATION) at 08:14

## 2019-01-01 RX ADMIN — ALTEPLASE 1 MG/HR: 2.2 INJECTION, POWDER, LYOPHILIZED, FOR SOLUTION INTRAVENOUS at 13:14

## 2019-01-01 RX ADMIN — Medication 10 ML: at 08:50

## 2019-01-01 RX ADMIN — BUPROPION HYDROCHLORIDE 150 MG: 150 TABLET, FILM COATED, EXTENDED RELEASE ORAL at 08:50

## 2019-01-01 RX ADMIN — ATORVASTATIN CALCIUM 40 MG: 40 TABLET, FILM COATED ORAL at 21:27

## 2019-01-01 RX ADMIN — ATORVASTATIN CALCIUM 40 MG: 40 TABLET, FILM COATED ORAL at 20:41

## 2019-01-01 RX ADMIN — IPRATROPIUM BROMIDE AND ALBUTEROL SULFATE 1 AMPULE: .5; 3 SOLUTION RESPIRATORY (INHALATION) at 20:04

## 2019-01-01 RX ADMIN — HYDROCODONE BITARTRATE AND ACETAMINOPHEN 1 TABLET: 5; 325 TABLET ORAL at 03:08

## 2019-01-01 RX ADMIN — FAMOTIDINE 20 MG: 20 TABLET ORAL at 07:56

## 2019-01-01 RX ADMIN — FAMOTIDINE 20 MG: 20 TABLET ORAL at 09:20

## 2019-01-01 RX ADMIN — INSULIN LISPRO 1 UNITS: 100 INJECTION, SOLUTION INTRAVENOUS; SUBCUTANEOUS at 17:38

## 2019-01-01 RX ADMIN — HYDROCODONE BITARTRATE AND ACETAMINOPHEN 1 TABLET: 5; 325 TABLET ORAL at 07:31

## 2019-01-01 RX ADMIN — MUPIROCIN: 20 OINTMENT TOPICAL at 09:32

## 2019-01-01 RX ADMIN — ATORVASTATIN CALCIUM 40 MG: 40 TABLET, FILM COATED ORAL at 20:20

## 2019-01-01 RX ADMIN — INSULIN LISPRO 1 UNITS: 100 INJECTION, SOLUTION INTRAVENOUS; SUBCUTANEOUS at 16:54

## 2019-01-01 RX ADMIN — ATORVASTATIN CALCIUM 40 MG: 10 TABLET, FILM COATED ORAL at 21:56

## 2019-01-01 RX ADMIN — HYDROCODONE BITARTRATE AND ACETAMINOPHEN 1 TABLET: 5; 325 TABLET ORAL at 06:33

## 2019-01-01 RX ADMIN — Medication 10 ML: at 20:40

## 2019-01-01 RX ADMIN — ATORVASTATIN CALCIUM 40 MG: 10 TABLET, FILM COATED ORAL at 20:36

## 2019-01-01 RX ADMIN — FUROSEMIDE 40 MG: 40 TABLET ORAL at 08:35

## 2019-01-01 RX ADMIN — INSULIN LISPRO 1 UNITS: 100 INJECTION, SOLUTION INTRAVENOUS; SUBCUTANEOUS at 17:36

## 2019-01-01 RX ADMIN — APIXABAN 5 MG: 5 TABLET, FILM COATED ORAL at 21:00

## 2019-01-01 RX ADMIN — MUPIROCIN: 20 OINTMENT TOPICAL at 09:10

## 2019-01-01 RX ADMIN — IPRATROPIUM BROMIDE AND ALBUTEROL SULFATE 1 AMPULE: .5; 3 SOLUTION RESPIRATORY (INHALATION) at 16:00

## 2019-01-01 RX ADMIN — INSULIN LISPRO 1 UNITS: 100 INJECTION, SOLUTION INTRAVENOUS; SUBCUTANEOUS at 20:43

## 2019-01-01 RX ADMIN — APIXABAN 5 MG: 5 TABLET, FILM COATED ORAL at 07:51

## 2019-01-01 RX ADMIN — HEPARIN SODIUM AND DEXTROSE 250 UNITS/HR: 10000; 5 INJECTION INTRAVENOUS at 12:26

## 2019-01-01 RX ADMIN — IPRATROPIUM BROMIDE AND ALBUTEROL SULFATE 1 AMPULE: .5; 3 SOLUTION RESPIRATORY (INHALATION) at 13:01

## 2019-01-01 RX ADMIN — IPRATROPIUM BROMIDE AND ALBUTEROL SULFATE 1 AMPULE: .5; 3 SOLUTION RESPIRATORY (INHALATION) at 10:06

## 2019-01-01 RX ADMIN — IPRATROPIUM BROMIDE AND ALBUTEROL SULFATE 1 AMPULE: .5; 3 SOLUTION RESPIRATORY (INHALATION) at 15:31

## 2019-01-01 RX ADMIN — LISINOPRIL 20 MG: 20 TABLET ORAL at 09:38

## 2019-01-01 RX ADMIN — APIXABAN 5 MG: 5 TABLET, FILM COATED ORAL at 08:14

## 2019-01-01 RX ADMIN — IPRATROPIUM BROMIDE AND ALBUTEROL SULFATE 1 AMPULE: .5; 3 SOLUTION RESPIRATORY (INHALATION) at 14:23

## 2019-01-01 RX ADMIN — FUROSEMIDE 40 MG: 40 TABLET ORAL at 08:58

## 2019-01-01 RX ADMIN — FUROSEMIDE 40 MG: 40 TABLET ORAL at 09:15

## 2019-01-01 RX ADMIN — IPRATROPIUM BROMIDE AND ALBUTEROL SULFATE 1 AMPULE: .5; 3 SOLUTION RESPIRATORY (INHALATION) at 20:40

## 2019-01-01 RX ADMIN — HYDROCODONE BITARTRATE AND ACETAMINOPHEN 1 TABLET: 5; 325 TABLET ORAL at 08:57

## 2019-01-01 RX ADMIN — ENOXAPARIN SODIUM 40 MG: 40 INJECTION SUBCUTANEOUS at 08:52

## 2019-01-01 RX ADMIN — IPRATROPIUM BROMIDE AND ALBUTEROL SULFATE 1 AMPULE: .5; 3 SOLUTION RESPIRATORY (INHALATION) at 19:52

## 2019-01-01 RX ADMIN — FUROSEMIDE 40 MG: 40 TABLET ORAL at 07:51

## 2019-01-01 RX ADMIN — ATORVASTATIN CALCIUM 40 MG: 40 TABLET, FILM COATED ORAL at 19:57

## 2019-01-01 RX ADMIN — FUROSEMIDE 40 MG: 40 TABLET ORAL at 08:04

## 2019-01-01 RX ADMIN — TIZANIDINE 4 MG: 4 TABLET ORAL at 20:21

## 2019-01-01 RX ADMIN — HYDROCODONE BITARTRATE AND ACETAMINOPHEN 1 TABLET: 5; 325 TABLET ORAL at 19:40

## 2019-01-01 RX ADMIN — FAMOTIDINE 20 MG: 20 TABLET ORAL at 20:33

## 2019-01-01 RX ADMIN — ATORVASTATIN CALCIUM 40 MG: 40 TABLET, FILM COATED ORAL at 20:19

## 2019-01-01 RX ADMIN — IPRATROPIUM BROMIDE AND ALBUTEROL SULFATE 1 AMPULE: .5; 3 SOLUTION RESPIRATORY (INHALATION) at 16:04

## 2019-01-01 RX ADMIN — IPRATROPIUM BROMIDE AND ALBUTEROL SULFATE 1 AMPULE: .5; 3 SOLUTION RESPIRATORY (INHALATION) at 12:19

## 2019-01-01 RX ADMIN — IPRATROPIUM BROMIDE AND ALBUTEROL SULFATE 1 AMPULE: .5; 3 SOLUTION RESPIRATORY (INHALATION) at 09:28

## 2019-01-01 RX ADMIN — LISINOPRIL 20 MG: 20 TABLET ORAL at 09:11

## 2019-01-01 RX ADMIN — TIZANIDINE 4 MG: 4 TABLET ORAL at 19:57

## 2019-01-01 RX ADMIN — DOXYCYCLINE 100 MG: 100 INJECTION, POWDER, LYOPHILIZED, FOR SOLUTION INTRAVENOUS at 08:51

## 2019-01-01 RX ADMIN — FUROSEMIDE 40 MG: 40 TABLET ORAL at 10:07

## 2019-01-01 RX ADMIN — FAMOTIDINE 20 MG: 20 TABLET ORAL at 19:53

## 2019-01-01 ASSESSMENT — PAIN DESCRIPTION - DESCRIPTORS
DESCRIPTORS: ACHING
DESCRIPTORS: DISCOMFORT;ACHING
DESCRIPTORS: ACHING
DESCRIPTORS: ACHING;DISCOMFORT
DESCRIPTORS: ACHING;RADIATING
DESCRIPTORS: ACHING

## 2019-01-01 ASSESSMENT — PAIN DESCRIPTION - ORIENTATION
ORIENTATION: RIGHT;LEFT
ORIENTATION: RIGHT;LOWER
ORIENTATION: RIGHT
ORIENTATION: RIGHT;LEFT
ORIENTATION: RIGHT
ORIENTATION: LOWER
ORIENTATION: LOWER
ORIENTATION: RIGHT;LEFT
ORIENTATION: RIGHT
ORIENTATION: RIGHT;LEFT
ORIENTATION: RIGHT
ORIENTATION: LOWER
ORIENTATION: MID;RIGHT
ORIENTATION: RIGHT
ORIENTATION: LEFT;RIGHT
ORIENTATION: RIGHT
ORIENTATION: RIGHT;MID
ORIENTATION: RIGHT;LEFT
ORIENTATION: LOWER
ORIENTATION: RIGHT
ORIENTATION: LOWER
ORIENTATION: RIGHT
ORIENTATION: RIGHT;LEFT
ORIENTATION: RIGHT;LEFT
ORIENTATION: RIGHT
ORIENTATION: RIGHT;MID
ORIENTATION: RIGHT
ORIENTATION: LEFT;RIGHT
ORIENTATION: RIGHT
ORIENTATION: RIGHT;LEFT
ORIENTATION: RIGHT;LEFT
ORIENTATION: RIGHT
ORIENTATION: RIGHT;LEFT
ORIENTATION: LEFT;RIGHT
ORIENTATION: RIGHT;LEFT
ORIENTATION: RIGHT;LEFT
ORIENTATION: RIGHT
ORIENTATION: LOWER
ORIENTATION: RIGHT
ORIENTATION: LEFT;RIGHT

## 2019-01-01 ASSESSMENT — PAIN SCALES - GENERAL
PAINLEVEL_OUTOF10: 8
PAINLEVEL_OUTOF10: 5
PAINLEVEL_OUTOF10: 9
PAINLEVEL_OUTOF10: 8
PAINLEVEL_OUTOF10: 3
PAINLEVEL_OUTOF10: 0
PAINLEVEL_OUTOF10: 8
PAINLEVEL_OUTOF10: 6
PAINLEVEL_OUTOF10: 10
PAINLEVEL_OUTOF10: 5
PAINLEVEL_OUTOF10: 3
PAINLEVEL_OUTOF10: 0
PAINLEVEL_OUTOF10: 8
PAINLEVEL_OUTOF10: 4
PAINLEVEL_OUTOF10: 8
PAINLEVEL_OUTOF10: 0
PAINLEVEL_OUTOF10: 9
PAINLEVEL_OUTOF10: 2
PAINLEVEL_OUTOF10: 0
PAINLEVEL_OUTOF10: 8
PAINLEVEL_OUTOF10: 8
PAINLEVEL_OUTOF10: 2
PAINLEVEL_OUTOF10: 8
PAINLEVEL_OUTOF10: 8
PAINLEVEL_OUTOF10: 7
PAINLEVEL_OUTOF10: 2
PAINLEVEL_OUTOF10: 8
PAINLEVEL_OUTOF10: 9
PAINLEVEL_OUTOF10: 8
PAINLEVEL_OUTOF10: 0
PAINLEVEL_OUTOF10: 7
PAINLEVEL_OUTOF10: 8
PAINLEVEL_OUTOF10: 9
PAINLEVEL_OUTOF10: 7
PAINLEVEL_OUTOF10: 8
PAINLEVEL_OUTOF10: 0
PAINLEVEL_OUTOF10: 7
PAINLEVEL_OUTOF10: 5
PAINLEVEL_OUTOF10: 7
PAINLEVEL_OUTOF10: 8
PAINLEVEL_OUTOF10: 9
PAINLEVEL_OUTOF10: 0
PAINLEVEL_OUTOF10: 7
PAINLEVEL_OUTOF10: 3
PAINLEVEL_OUTOF10: 8
PAINLEVEL_OUTOF10: 9
PAINLEVEL_OUTOF10: 7
PAINLEVEL_OUTOF10: 8
PAINLEVEL_OUTOF10: 6
PAINLEVEL_OUTOF10: 8
PAINLEVEL_OUTOF10: 9
PAINLEVEL_OUTOF10: 7
PAINLEVEL_OUTOF10: 7
PAINLEVEL_OUTOF10: 3
PAINLEVEL_OUTOF10: 0
PAINLEVEL_OUTOF10: 5
PAINLEVEL_OUTOF10: 5
PAINLEVEL_OUTOF10: 6
PAINLEVEL_OUTOF10: 0
PAINLEVEL_OUTOF10: 8
PAINLEVEL_OUTOF10: 0
PAINLEVEL_OUTOF10: 0
PAINLEVEL_OUTOF10: 5
PAINLEVEL_OUTOF10: 9
PAINLEVEL_OUTOF10: 0
PAINLEVEL_OUTOF10: 5
PAINLEVEL_OUTOF10: 8
PAINLEVEL_OUTOF10: 3
PAINLEVEL_OUTOF10: 5
PAINLEVEL_OUTOF10: 8
PAINLEVEL_OUTOF10: 8
PAINLEVEL_OUTOF10: 7
PAINLEVEL_OUTOF10: 3
PAINLEVEL_OUTOF10: 7
PAINLEVEL_OUTOF10: 9
PAINLEVEL_OUTOF10: 0
PAINLEVEL_OUTOF10: 0
PAINLEVEL_OUTOF10: 8
PAINLEVEL_OUTOF10: 10
PAINLEVEL_OUTOF10: 9
PAINLEVEL_OUTOF10: 0
PAINLEVEL_OUTOF10: 8
PAINLEVEL_OUTOF10: 8
PAINLEVEL_OUTOF10: 9
PAINLEVEL_OUTOF10: 9
PAINLEVEL_OUTOF10: 0
PAINLEVEL_OUTOF10: 0
PAINLEVEL_OUTOF10: 7
PAINLEVEL_OUTOF10: 0
PAINLEVEL_OUTOF10: 8
PAINLEVEL_OUTOF10: 8
PAINLEVEL_OUTOF10: 3
PAINLEVEL_OUTOF10: 0
PAINLEVEL_OUTOF10: 7
PAINLEVEL_OUTOF10: 3
PAINLEVEL_OUTOF10: 0
PAINLEVEL_OUTOF10: 7
PAINLEVEL_OUTOF10: 6
PAINLEVEL_OUTOF10: 8
PAINLEVEL_OUTOF10: 8
PAINLEVEL_OUTOF10: 7
PAINLEVEL_OUTOF10: 5
PAINLEVEL_OUTOF10: 8
PAINLEVEL_OUTOF10: 8
PAINLEVEL_OUTOF10: 4
PAINLEVEL_OUTOF10: 0
PAINLEVEL_OUTOF10: 7
PAINLEVEL_OUTOF10: 0
PAINLEVEL_OUTOF10: 0
PAINLEVEL_OUTOF10: 8
PAINLEVEL_OUTOF10: 0
PAINLEVEL_OUTOF10: 0
PAINLEVEL_OUTOF10: 7
PAINLEVEL_OUTOF10: 4
PAINLEVEL_OUTOF10: 8
PAINLEVEL_OUTOF10: 0
PAINLEVEL_OUTOF10: 8
PAINLEVEL_OUTOF10: 7
PAINLEVEL_OUTOF10: 4
PAINLEVEL_OUTOF10: 8
PAINLEVEL_OUTOF10: 5
PAINLEVEL_OUTOF10: 7
PAINLEVEL_OUTOF10: 8
PAINLEVEL_OUTOF10: 5
PAINLEVEL_OUTOF10: 0
PAINLEVEL_OUTOF10: 8
PAINLEVEL_OUTOF10: 7
PAINLEVEL_OUTOF10: 8
PAINLEVEL_OUTOF10: 3
PAINLEVEL_OUTOF10: 5
PAINLEVEL_OUTOF10: 0
PAINLEVEL_OUTOF10: 0
PAINLEVEL_OUTOF10: 7
PAINLEVEL_OUTOF10: 9
PAINLEVEL_OUTOF10: 6
PAINLEVEL_OUTOF10: 1
PAINLEVEL_OUTOF10: 6
PAINLEVEL_OUTOF10: 9
PAINLEVEL_OUTOF10: 9
PAINLEVEL_OUTOF10: 8
PAINLEVEL_OUTOF10: 9
PAINLEVEL_OUTOF10: 0
PAINLEVEL_OUTOF10: 3
PAINLEVEL_OUTOF10: 9
PAINLEVEL_OUTOF10: 8

## 2019-01-01 ASSESSMENT — PAIN DESCRIPTION - PAIN TYPE
TYPE: CHRONIC PAIN
TYPE: ACUTE PAIN;CHRONIC PAIN
TYPE: CHRONIC PAIN
TYPE: ACUTE PAIN;CHRONIC PAIN
TYPE: CHRONIC PAIN
TYPE: ACUTE PAIN;CHRONIC PAIN
TYPE: CHRONIC PAIN
TYPE: ACUTE PAIN;CHRONIC PAIN
TYPE: CHRONIC PAIN
TYPE: ACUTE PAIN;CHRONIC PAIN

## 2019-01-01 ASSESSMENT — PAIN DESCRIPTION - LOCATION
LOCATION: BACK
LOCATION: HIP
LOCATION: LEG;HIP
LOCATION: BACK;HIP
LOCATION: LEG
LOCATION: BACK
LOCATION: BACK;LEG
LOCATION: BACK;HIP
LOCATION: HIP
LOCATION: BACK
LOCATION: HIP
LOCATION: BACK
LOCATION: LEG
LOCATION: HIP
LOCATION: BACK
LOCATION: HIP
LOCATION: LEG;BACK
LOCATION: HIP
LOCATION: HIP
LOCATION: HIP;LEG
LOCATION: BACK;HIP
LOCATION: LEG;HIP
LOCATION: LEG
LOCATION: BACK;HIP
LOCATION: HIP
LOCATION: BACK;HIP
LOCATION: BACK
LOCATION: LEG;HIP
LOCATION: BACK
LOCATION: BACK
LOCATION: HIP
LOCATION: LEG;HIP
LOCATION: BACK;HIP
LOCATION: HIP
LOCATION: HIP
LOCATION: HIP;LEG
LOCATION: LEG;HIP
LOCATION: HIP
LOCATION: BACK
LOCATION: HIP;LEG
LOCATION: BACK
LOCATION: BACK
LOCATION: HIP
LOCATION: BACK
LOCATION: LEG
LOCATION: LEG;HIP
LOCATION: LEG
LOCATION: BACK
LOCATION: BACK
LOCATION: BACK;HIP

## 2019-01-01 ASSESSMENT — PAIN DESCRIPTION - ONSET
ONSET: GRADUAL
ONSET: PROGRESSIVE
ONSET: ON-GOING

## 2019-01-01 ASSESSMENT — PAIN - FUNCTIONAL ASSESSMENT
PAIN_FUNCTIONAL_ASSESSMENT: PREVENTS OR INTERFERES SOME ACTIVE ACTIVITIES AND ADLS
PAIN_FUNCTIONAL_ASSESSMENT: ACTIVITIES ARE NOT PREVENTED
PAIN_FUNCTIONAL_ASSESSMENT: PREVENTS OR INTERFERES SOME ACTIVE ACTIVITIES AND ADLS
PAIN_FUNCTIONAL_ASSESSMENT: ACTIVITIES ARE NOT PREVENTED
PAIN_FUNCTIONAL_ASSESSMENT: PREVENTS OR INTERFERES SOME ACTIVE ACTIVITIES AND ADLS
PAIN_FUNCTIONAL_ASSESSMENT: 0-10

## 2019-01-01 ASSESSMENT — PAIN DESCRIPTION - PROGRESSION
CLINICAL_PROGRESSION: GRADUALLY IMPROVING
CLINICAL_PROGRESSION: NOT CHANGED
CLINICAL_PROGRESSION: NOT CHANGED
CLINICAL_PROGRESSION: GRADUALLY IMPROVING
CLINICAL_PROGRESSION: NOT CHANGED
CLINICAL_PROGRESSION: NOT CHANGED
CLINICAL_PROGRESSION: GRADUALLY WORSENING
CLINICAL_PROGRESSION: NOT CHANGED
CLINICAL_PROGRESSION: GRADUALLY WORSENING
CLINICAL_PROGRESSION: NOT CHANGED
CLINICAL_PROGRESSION: GRADUALLY IMPROVING
CLINICAL_PROGRESSION: NOT CHANGED
CLINICAL_PROGRESSION: GRADUALLY IMPROVING
CLINICAL_PROGRESSION: NOT CHANGED

## 2019-01-01 ASSESSMENT — ENCOUNTER SYMPTOMS
GASTROINTESTINAL NEGATIVE: 1
NAUSEA: 0
SHORTNESS OF BREATH: 1
TROUBLE SWALLOWING: 0
BACK PAIN: 0
VOMITING: 0
RESPIRATORY NEGATIVE: 1
GASTROINTESTINAL NEGATIVE: 1
WHEEZING: 0
EYES NEGATIVE: 1
PHOTOPHOBIA: 0
STRIDOR: 0
COLOR CHANGE: 0
FACIAL SWELLING: 0
ABDOMINAL PAIN: 0
EYES NEGATIVE: 1
GASTROINTESTINAL NEGATIVE: 1
EYES NEGATIVE: 1
SHORTNESS OF BREATH: 1
BLOOD IN STOOL: 0
RESPIRATORY NEGATIVE: 1
VOICE CHANGE: 0

## 2019-01-01 ASSESSMENT — PAIN DESCRIPTION - FREQUENCY
FREQUENCY: CONTINUOUS
FREQUENCY: INTERMITTENT
FREQUENCY: CONTINUOUS
FREQUENCY: CONTINUOUS
FREQUENCY: INTERMITTENT
FREQUENCY: CONTINUOUS
FREQUENCY: INTERMITTENT
FREQUENCY: INTERMITTENT
FREQUENCY: CONTINUOUS
FREQUENCY: CONTINUOUS
FREQUENCY: INTERMITTENT
FREQUENCY: INTERMITTENT

## 2019-01-03 RX ORDER — GLIPIZIDE 5 MG/1
TABLET ORAL
Qty: 30 TABLET | Refills: 3 | Status: SHIPPED | OUTPATIENT
Start: 2019-01-03 | End: 2019-05-31 | Stop reason: SDUPTHER

## 2019-01-09 DIAGNOSIS — M19.90 ARTHRITIS: ICD-10-CM

## 2019-01-09 RX ORDER — HYDROCODONE BITARTRATE AND ACETAMINOPHEN 5; 325 MG/1; MG/1
1 TABLET ORAL 3 TIMES DAILY
Qty: 90 TABLET | Refills: 0 | Status: SHIPPED | OUTPATIENT
Start: 2019-01-09 | End: 2019-02-07 | Stop reason: SDUPTHER

## 2019-01-14 RX ORDER — BUPROPION HYDROCHLORIDE 150 MG/1
TABLET ORAL
Qty: 30 TABLET | Refills: 1 | Status: SHIPPED | OUTPATIENT
Start: 2019-01-14 | End: 2019-03-18 | Stop reason: SDUPTHER

## 2019-02-07 ENCOUNTER — OFFICE VISIT (OUTPATIENT)
Dept: FAMILY MEDICINE CLINIC | Age: 84
End: 2019-02-07
Payer: MEDICARE

## 2019-02-07 VITALS
BODY MASS INDEX: 46.36 KG/M2 | OXYGEN SATURATION: 92 % | HEART RATE: 77 BPM | SYSTOLIC BLOOD PRESSURE: 145 MMHG | WEIGHT: 296 LBS | DIASTOLIC BLOOD PRESSURE: 76 MMHG

## 2019-02-07 DIAGNOSIS — E66.01 MORBID OBESITY DUE TO EXCESS CALORIES (HCC): ICD-10-CM

## 2019-02-07 DIAGNOSIS — E78.00 HYPERCHOLESTEREMIA: ICD-10-CM

## 2019-02-07 DIAGNOSIS — K21.9 GASTROESOPHAGEAL REFLUX DISEASE WITHOUT ESOPHAGITIS: ICD-10-CM

## 2019-02-07 DIAGNOSIS — E66.01 MORBID OBESITY WITH BMI OF 45.0-49.9, ADULT (HCC): ICD-10-CM

## 2019-02-07 DIAGNOSIS — E11.9 TYPE 2 DIABETES MELLITUS WITHOUT COMPLICATION, WITHOUT LONG-TERM CURRENT USE OF INSULIN (HCC): Primary | ICD-10-CM

## 2019-02-07 DIAGNOSIS — Z99.89 OSA ON CPAP: ICD-10-CM

## 2019-02-07 DIAGNOSIS — I10 ESSENTIAL HYPERTENSION: ICD-10-CM

## 2019-02-07 DIAGNOSIS — Z72.0 SMOKELESS TOBACCO USE: ICD-10-CM

## 2019-02-07 DIAGNOSIS — J43.1 PANLOBULAR EMPHYSEMA (HCC): ICD-10-CM

## 2019-02-07 DIAGNOSIS — I25.10 CORONARY ARTERY DISEASE INVOLVING NATIVE CORONARY ARTERY OF NATIVE HEART WITHOUT ANGINA PECTORIS: ICD-10-CM

## 2019-02-07 DIAGNOSIS — G47.33 OSA ON CPAP: ICD-10-CM

## 2019-02-07 DIAGNOSIS — M19.90 ARTHRITIS: ICD-10-CM

## 2019-02-07 LAB
A/G RATIO: 1.8 (ref 1.1–2.2)
ALBUMIN SERPL-MCNC: 4.2 G/DL (ref 3.4–5)
ALP BLD-CCNC: 39 U/L (ref 40–129)
ALT SERPL-CCNC: 28 U/L (ref 10–40)
ANION GAP SERPL CALCULATED.3IONS-SCNC: 13 MMOL/L (ref 3–16)
AST SERPL-CCNC: 23 U/L (ref 15–37)
BASOPHILS ABSOLUTE: 0.1 K/UL (ref 0–0.2)
BASOPHILS RELATIVE PERCENT: 0.9 %
BILIRUB SERPL-MCNC: 0.3 MG/DL (ref 0–1)
BUN BLDV-MCNC: 13 MG/DL (ref 7–20)
CALCIUM SERPL-MCNC: 9.5 MG/DL (ref 8.3–10.6)
CHLORIDE BLD-SCNC: 95 MMOL/L (ref 99–110)
CHOLESTEROL, TOTAL: 164 MG/DL (ref 0–199)
CO2: 28 MMOL/L (ref 21–32)
CREAT SERPL-MCNC: 0.5 MG/DL (ref 0.8–1.3)
EOSINOPHILS ABSOLUTE: 0.1 K/UL (ref 0–0.6)
EOSINOPHILS RELATIVE PERCENT: 2.1 %
GFR AFRICAN AMERICAN: >60
GFR NON-AFRICAN AMERICAN: >60
GLOBULIN: 2.4 G/DL
GLUCOSE BLD-MCNC: 149 MG/DL (ref 70–99)
HCT VFR BLD CALC: 43 % (ref 40.5–52.5)
HDLC SERPL-MCNC: 40 MG/DL (ref 40–60)
HEMOGLOBIN: 14.2 G/DL (ref 13.5–17.5)
LDL CHOLESTEROL CALCULATED: 78 MG/DL
LYMPHOCYTES ABSOLUTE: 1 K/UL (ref 1–5.1)
LYMPHOCYTES RELATIVE PERCENT: 15.2 %
MCH RBC QN AUTO: 31.5 PG (ref 26–34)
MCHC RBC AUTO-ENTMCNC: 33.1 G/DL (ref 31–36)
MCV RBC AUTO: 95.4 FL (ref 80–100)
MONOCYTES ABSOLUTE: 0.5 K/UL (ref 0–1.3)
MONOCYTES RELATIVE PERCENT: 8.5 %
NEUTROPHILS ABSOLUTE: 4.7 K/UL (ref 1.7–7.7)
NEUTROPHILS RELATIVE PERCENT: 73.3 %
PDW BLD-RTO: 13.9 % (ref 12.4–15.4)
PLATELET # BLD: 309 K/UL (ref 135–450)
PMV BLD AUTO: 8.1 FL (ref 5–10.5)
POTASSIUM SERPL-SCNC: 4.5 MMOL/L (ref 3.5–5.1)
RBC # BLD: 4.51 M/UL (ref 4.2–5.9)
SODIUM BLD-SCNC: 136 MMOL/L (ref 136–145)
TOTAL PROTEIN: 6.6 G/DL (ref 6.4–8.2)
TRIGL SERPL-MCNC: 228 MG/DL (ref 0–150)
VLDLC SERPL CALC-MCNC: 46 MG/DL
WBC # BLD: 6.4 K/UL (ref 4–11)

## 2019-02-07 PROCEDURE — 4004F PT TOBACCO SCREEN RCVD TLK: CPT | Performed by: NURSE PRACTITIONER

## 2019-02-07 PROCEDURE — G8926 SPIRO NO PERF OR DOC: HCPCS | Performed by: NURSE PRACTITIONER

## 2019-02-07 PROCEDURE — 3023F SPIROM DOC REV: CPT | Performed by: NURSE PRACTITIONER

## 2019-02-07 PROCEDURE — 1123F ACP DISCUSS/DSCN MKR DOCD: CPT | Performed by: NURSE PRACTITIONER

## 2019-02-07 PROCEDURE — 99214 OFFICE O/P EST MOD 30 MIN: CPT | Performed by: NURSE PRACTITIONER

## 2019-02-07 PROCEDURE — 36415 COLL VENOUS BLD VENIPUNCTURE: CPT | Performed by: NURSE PRACTITIONER

## 2019-02-07 PROCEDURE — G8417 CALC BMI ABV UP PARAM F/U: HCPCS | Performed by: NURSE PRACTITIONER

## 2019-02-07 PROCEDURE — G8427 DOCREV CUR MEDS BY ELIG CLIN: HCPCS | Performed by: NURSE PRACTITIONER

## 2019-02-07 PROCEDURE — G8599 NO ASA/ANTIPLAT THER USE RNG: HCPCS | Performed by: NURSE PRACTITIONER

## 2019-02-07 PROCEDURE — G8482 FLU IMMUNIZE ORDER/ADMIN: HCPCS | Performed by: NURSE PRACTITIONER

## 2019-02-07 PROCEDURE — 4040F PNEUMOC VAC/ADMIN/RCVD: CPT | Performed by: NURSE PRACTITIONER

## 2019-02-07 PROCEDURE — 1101F PT FALLS ASSESS-DOCD LE1/YR: CPT | Performed by: NURSE PRACTITIONER

## 2019-02-07 RX ORDER — LISINOPRIL 20 MG/1
TABLET ORAL
Qty: 30 TABLET | Refills: 5 | Status: ON HOLD | OUTPATIENT
Start: 2019-02-07 | End: 2019-01-01 | Stop reason: HOSPADM

## 2019-02-07 RX ORDER — HYDROCODONE BITARTRATE AND ACETAMINOPHEN 5; 325 MG/1; MG/1
1 TABLET ORAL 3 TIMES DAILY
Qty: 90 TABLET | Refills: 0 | Status: SHIPPED | OUTPATIENT
Start: 2019-02-07 | End: 2019-03-08 | Stop reason: SDUPTHER

## 2019-02-07 ASSESSMENT — PATIENT HEALTH QUESTIONNAIRE - PHQ9
2. FEELING DOWN, DEPRESSED OR HOPELESS: 0
SUM OF ALL RESPONSES TO PHQ9 QUESTIONS 1 & 2: 0
1. LITTLE INTEREST OR PLEASURE IN DOING THINGS: 0
SUM OF ALL RESPONSES TO PHQ QUESTIONS 1-9: 0
SUM OF ALL RESPONSES TO PHQ QUESTIONS 1-9: 0

## 2019-02-07 ASSESSMENT — ENCOUNTER SYMPTOMS
RESPIRATORY NEGATIVE: 1
ALLERGIC/IMMUNOLOGIC NEGATIVE: 1
GASTROINTESTINAL NEGATIVE: 1
EYES NEGATIVE: 1

## 2019-02-08 LAB
ESTIMATED AVERAGE GLUCOSE: 171.4 MG/DL
HBA1C MFR BLD: 7.6 %

## 2019-02-15 RX ORDER — TIZANIDINE 4 MG/1
TABLET ORAL
Qty: 30 TABLET | Refills: 1 | Status: SHIPPED | OUTPATIENT
Start: 2019-02-15 | End: 2019-05-15

## 2019-02-27 RX ORDER — FENOFIBRATE 200 MG/1
CAPSULE ORAL
Qty: 30 CAPSULE | Refills: 5 | Status: SHIPPED | OUTPATIENT
Start: 2019-02-27 | End: 2019-01-01 | Stop reason: SDUPTHER

## 2019-03-08 DIAGNOSIS — M19.90 ARTHRITIS: ICD-10-CM

## 2019-03-08 RX ORDER — HYDROCODONE BITARTRATE AND ACETAMINOPHEN 5; 325 MG/1; MG/1
1 TABLET ORAL 3 TIMES DAILY
Qty: 90 TABLET | Refills: 0 | Status: SHIPPED | OUTPATIENT
Start: 2019-03-08 | End: 2019-04-10 | Stop reason: SDUPTHER

## 2019-03-15 RX ORDER — FUROSEMIDE 40 MG/1
TABLET ORAL
Qty: 30 TABLET | Refills: 2 | Status: SHIPPED | OUTPATIENT
Start: 2019-03-15 | End: 2019-01-01 | Stop reason: SDUPTHER

## 2019-03-18 RX ORDER — BUPROPION HYDROCHLORIDE 150 MG/1
TABLET ORAL
Qty: 30 TABLET | Refills: 3 | Status: SHIPPED | OUTPATIENT
Start: 2019-03-18 | End: 2019-01-01 | Stop reason: SDUPTHER

## 2019-04-03 RX ORDER — METFORMIN HYDROCHLORIDE 500 MG/1
TABLET, EXTENDED RELEASE ORAL
Qty: 30 TABLET | Refills: 3 | Status: SHIPPED | OUTPATIENT
Start: 2019-04-03 | End: 2019-01-01 | Stop reason: SDUPTHER

## 2019-04-10 DIAGNOSIS — M19.90 ARTHRITIS: ICD-10-CM

## 2019-04-10 RX ORDER — HYDROCODONE BITARTRATE AND ACETAMINOPHEN 5; 325 MG/1; MG/1
1 TABLET ORAL 3 TIMES DAILY
Qty: 90 TABLET | Refills: 0 | Status: SHIPPED | OUTPATIENT
Start: 2019-04-10 | End: 2019-05-09 | Stop reason: SDUPTHER

## 2019-04-10 NOTE — TELEPHONE ENCOUNTER
Date of last refill of this med was 3-8-19, # of pills given 90 and # of refills given 0. Their next appointment is 6-10-19, the last date patient was seen was 2-7-19. Does patient have medication agreement on file? Yes  Has drug screen been done in last 12 months if needed? no  Has OARRS report been ran in last 90 days?  Yes 4-10-19 Please review (must input date)

## 2019-05-01 DIAGNOSIS — M19.90 ARTHRITIS: ICD-10-CM

## 2019-05-01 RX ORDER — HYDROCODONE BITARTRATE AND ACETAMINOPHEN 5; 325 MG/1; MG/1
1 TABLET ORAL 3 TIMES DAILY
Qty: 90 TABLET | Refills: 0 | OUTPATIENT
Start: 2019-05-01 | End: 2019-05-31

## 2019-05-01 NOTE — TELEPHONE ENCOUNTER
Date of last refill of this med was 4/10/19, # of pills given 90 and # of refills given 0. Their next appointment is 6/10/19, the last date patient was seen was 2/7/19. Does patient have medication agreement on file? Yes  Has drug screen been done in last 12 months if needed? no  Has OARRS report been ran in last 90 days? 4/10/19 (must input date)    Last refill was 4/11/19 per OARRS. Request for refill is about 10 days early.

## 2019-05-09 DIAGNOSIS — M19.90 ARTHRITIS: ICD-10-CM

## 2019-05-09 RX ORDER — HYDROCODONE BITARTRATE AND ACETAMINOPHEN 5; 325 MG/1; MG/1
1 TABLET ORAL 3 TIMES DAILY
Qty: 90 TABLET | Refills: 0 | Status: SHIPPED | OUTPATIENT
Start: 2019-05-09 | End: 2019-01-01 | Stop reason: SDUPTHER

## 2019-05-09 NOTE — TELEPHONE ENCOUNTER
Date of last refill of this med was 04/10/2019, # of pills given 90 and # of refills given 0. Their next appointment is 06/10/2019, the last date patient was seen was 02/07/2019. Does patient have medication agreement on file? Yes  Has drug screen been done in last 12 months if needed? no  Has OARRS report been ran in last 90 days?  05/09/2019

## 2019-05-14 RX ORDER — POTASSIUM CHLORIDE 750 MG/1
TABLET, EXTENDED RELEASE ORAL
Qty: 30 TABLET | Refills: 3 | Status: ON HOLD | OUTPATIENT
Start: 2019-05-14 | End: 2019-01-01 | Stop reason: HOSPADM

## 2019-05-15 RX ORDER — ATORVASTATIN CALCIUM 40 MG/1
TABLET, FILM COATED ORAL
Qty: 30 TABLET | Refills: 5 | Status: SHIPPED | OUTPATIENT
Start: 2019-05-15 | End: 2019-01-01 | Stop reason: SDUPTHER

## 2019-05-15 RX ORDER — TIZANIDINE 4 MG/1
TABLET ORAL
Qty: 30 TABLET | Refills: 1 | Status: SHIPPED | OUTPATIENT
Start: 2019-05-15 | End: 2019-01-01

## 2019-05-31 RX ORDER — GLIPIZIDE 5 MG/1
TABLET ORAL
Qty: 30 TABLET | Refills: 3 | Status: ON HOLD | OUTPATIENT
Start: 2019-05-31 | End: 2019-01-01 | Stop reason: HOSPADM

## 2019-05-31 NOTE — TELEPHONE ENCOUNTER
Future appt scheduled 6/10/19  Last appt 10/9/18  Refilled medication per verbal order from provider.

## 2019-06-10 NOTE — PATIENT INSTRUCTIONS
Please read the healthy family handout that you were given and share it with your family. Please compare this printed medication list with your medications at home to be sure they are the same. If you have any medications that are different please contact us immediately at [phone]. Also review your allergies that we have listed, these may also include medications that you have not been able to tolerate, make sure everything listed is correct. If you have any allergies that are different please contact us immediately at [phone]. Patient Education      Monitor blood pressure 3 times weekly over the next 2 weeks and call if consistently > 138/88. Home Blood Pressure Test: About This Test  What is it? A home blood pressure test allows you to keep track of your blood pressure at home. Blood pressure is a measure of the force of blood against the walls of your arteries. Blood pressure readings include two numbers, such as 130/80 (say \"130 over 80\"). The first number is the systolic pressure. The second number is the diastolic pressure. Why is this test done? You may do this test at home to:  · Find out if you have high blood pressure. · Track your blood pressure if you have high blood pressure. · Track how well medicine is working to reduce high blood pressure. · Check how lifestyle changes, such as weight loss and exercise, are affecting blood pressure. How can you prepare for the test?  · Do not use caffeine, tobacco, or medicines known to raise blood pressure (such as nasal decongestant sprays) for at least 30 minutes before taking your blood pressure. · Do not exercise for at least 30 minutes before taking your blood pressure. What happens before the test?  Take your blood pressure while you feel comfortable and relaxed.  Sit quietly with both feet on the floor for at least 5 minutes before the test.  What happens during the test?  · Sit with your arm slightly bent and resting on a table so that your upper arm is at the same level as your heart. · Roll up your sleeve or take off your shirt to expose your upper arm. · Wrap the blood pressure cuff around your upper arm so that the lower edge of the cuff is about 1 inch above the bend of your elbow. Proceed with the following steps depending on if you are using an automatic or manual pressure monitor. Automatic blood pressure monitors  · Press the on/off button on the automatic monitor and wait until the ready-to-measure \"heart\" symbol appears next to zero in the display window. · Press the start button. The cuff will inflate and deflate by itself. · Your blood pressure numbers will appear on the screen. · Write your numbers in your log book, along with the date and time. Manual blood pressure monitors  · Place the earpieces of a stethoscope in your ears, and place the bell of the stethoscope over the artery, just below the cuff. · Close the valve on the rubber inflating bulb. · Squeeze the bulb rapidly with your opposite hand to inflate the cuff until the dial or column of mercury reads about 30 mm Hg higher than your usual systolic pressure. If you do not know your usual pressure, inflate the cuff to 210 mm Hg or until the pulse at your wrist disappears. · Open the pressure valve just slightly by twisting or pressing the valve on the bulb. · As you watch the pressure slowly fall, note the level on the dial at which you first start to hear a pulsing or tapping sound through the stethoscope. This is your systolic blood pressure. · Continue letting the air out slowly. The sounds will become muffled and will finally disappear. Note the pressure when the sounds completely disappear. This is your diastolic blood pressure. Let out all the remaining air. · Write your numbers in your log book, along with the date and time.   What else should you know about the test?  It is more accurate to take the average of several readings made

## 2019-06-10 NOTE — PROGRESS NOTES
Subjective:      Patient ID: Janie Lake is a 80 y.o. male. HPI    Chief Complaint   Patient presents with    Check-Up       Treatment Adherence:   Medication compliance:  compliant most of the time  Diet compliance:  noncompliant: most of the time  Weight trend: stable  Current exercise: no regular exercise  Barriers: impairment:  physical: arthritis    Diabetes Mellitus Type 2: Current symptoms/problems include none. Home blood sugar records: fasting range: 160-200  Any episodes of hypoglycemia? no  Eye exam current (within one year): yes  Tobacco history: He  reports that he quit smoking about 9 years ago. He has a 70.00 pack-year smoking history. His smokeless tobacco use includes chew. Daily Aspirin? Yes    Hypertension:  Home blood pressure monitoring: No.  He is not adherent to a low sodium diet. Patient denies chest pain, shortness of breath, headache, lightheadedness, blurred vision, peripheral edema, palpitations, dry cough and fatigue. Antihypertensive medication side effects: no medication side effects noted. Use of agents associated with hypertension: none. Hyperlipidemia:  No new myalgias or GI upset on atorvastatin (Lipitor). Lab Results   Component Value Date    LABA1C 7.6 02/07/2019    LABA1C 7.2 10/09/2018    LABA1C 7.2 07/09/2018     Lab Results   Component Value Date    CREATININE 0.5 (L) 02/07/2019     Lab Results   Component Value Date    ALT 28 02/07/2019    AST 23 02/07/2019     Lab Results   Component Value Date    CHOL 164 02/07/2019    TRIG 228 (H) 02/07/2019    HDL 40 02/07/2019    LDLCALC 78 02/07/2019    LDLDIRECT 111 (H) 07/04/2016        Review of Systems   Constitutional: Negative for appetite change, chills and fever. HENT: Negative. Eyes: Negative. Respiratory: Negative. Cardiovascular: Positive for leg swelling. Negative for chest pain and palpitations. Gastrointestinal: Negative. Endocrine: Negative. Genitourinary: Negative. Musculoskeletal: Positive for arthralgias. Skin: Negative. Neurological: Negative. Hematological: Negative. Psychiatric/Behavioral: Negative. Patient Active Problem List   Diagnosis    Bladder carcinoma ([facility] [address] [facility]    History of melanoma    Arthritis    Hypercholesteremia    Gastroesophageal reflux disease without esophagitis    Narcolepsy    History of diverticulitis of colon    Benign prostatic hyperplasia with urinary obstruction    Smokeless tobacco use    Type 2 diabetes mellitus without complication, without long-term current use of insulin (HCC)    Essential hypertension    Panlobular emphysema (HCC)    Coronary artery disease involving native coronary artery of native heart without angina pectoris    GENESIS on CPAP    Morbid obesity due to excess calories ([facility] [facility]    History of Bell's palsy       Outpatient Medications Marked as Taking for the 6/10/19 encounter (Office Visit) with MADY Linn CNP   Medication Sig Dispense Refill    glipiZIDE (GLUCOTROL) 5 MG tablet TAKE (1) TABLET DAILY 30 tablet 3    tiZANidine (ZANAFLEX) 4 MG tablet TAKE 1 TABLET NIGHTLY 30 tablet 1    atorvastatin (LIPITOR) 40 MG tablet TAKE (1) TABLET DAILY 30 tablet 5    potassium chloride (KLOR-CON M) 10 MEQ extended release tablet TAKE 1 TABLET EVERY DAY 30 tablet 3    metFORMIN (GLUCOPHAGE-XR) 500 MG extended release tablet TAKE (1) TABLET DAILY WITH BREAKFAST.  30 tablet 3    buPROPion (WELLBUTRIN XL) 150 MG extended release tablet TAKE 1 TABLET EVERY MORNING 30 tablet 3    furosemide (LASIX) 40 MG tablet TAKE 1 TABLET ONCE DAILY 30 tablet 2    fenofibrate micronized (LOFIBRA) 200 MG capsule TAKE 1 CAPSULE BY MOUTH EVERY MORNING BEFORE BREAKFAST 30 capsule 5    lisinopril (PRINIVIL;ZESTRIL) 20 MG tablet TAKE (1) TABLET DAILY 30 tablet 5    meloxicam (MOBIC) 7.5 MG tablet TAKE (1) TABLET DAILY 30 tablet 5    blood glucose test strips (ONE TOUCH ULTRA TEST) strip Test once daily DX: E11.9 100 strip 3    potassium chloride (KLOR-CON M) 10 MEQ extended release tablet TAKE 1 TABLET EVERY DAY 30 tablet 5    Blood Glucose Monitoring Suppl ANDRIA 1 applicator by Does not apply route daily DX: E11.9 ONE TOUCH MACHINE AND TESTS ONCE A DAY 1 Device 0    Lancets MISC DX E11.9 AND TESTS ONCE A  each 3    fish oil-omega-3 fatty acids 1000 MG capsule Take 1 g by mouth 2 times daily.  OXYGEN 2 L nightly. No Known Allergies    Social History     Tobacco Use    Smoking status: Former Smoker     Packs/day: 1.00     Years: 70.00     Pack years: 70.00     Last attempt to quit: 3/6/2010     Years since quittin.2    Smokeless tobacco: Current User     Types: Chew    Tobacco comment: chews   Substance Use Topics    Alcohol use: No       Objective:   BP (!) 160/95   Pulse 78   SpO2 (!) 89% Comment: uses oxygen at night    Vitals:    06/10/19 1100 06/10/19 1312   BP: (!) 160/95 (!) 158/92   Pulse: 78    SpO2: (!) 89%      Physical Exam   Constitutional: He is oriented to person, place, and time. He appears well-developed and well-nourished. HENT:   Head: Normocephalic and atraumatic. Eyes: Conjunctivae and EOM are normal.   Neck: Neck supple. Cardiovascular: Normal rate, regular rhythm, S1 normal, S2 normal, normal heart sounds and intact distal pulses. 2+ pitting edema bilat lower legs/ankles   Pulmonary/Chest: Effort normal and breath sounds normal.   Abdominal: Soft. Bowel sounds are normal. There is no tenderness. Musculoskeletal:   W/c   Neurological: He is alert and oriented to person, place, and time. Skin: Skin is warm, dry and intact. No rash noted. Psychiatric: He has a normal mood and affect. His speech is normal and behavior is normal.       Assessment/Plan:   1.  Coronary artery disease involving native coronary artery of native heart without angina pectoris  Patient presents today to follow-up on chronic conditions including coronary artery disease, obstructive sleep apnea, diabetes, hypertension, hyperlipidemia and arthritis. Patient reports he is doing well over all. Fasting blood sugars typically range 160-200. A1c today 7.8 which is up from 7.6. Patient reports he does take medications as prescribed however does not follow a diabetic diet. Patient is unable to exercise due to physical limitations and joint pain. Recommend patient continue with current medication regimen and monitor blood sugars at home. Blood pressure is significantly elevated today however patient has not taken any of his morning medications including Lasix 40 mg and lisinopril 20 mg. Reviewed A1c goal 7.5 given age and co-morbidities. 2. GENESIS on CPAP  Stable. 3. Type 2 diabetes mellitus without complication, without long-term current use of insulin ([facility] [address]  See note above. - POCT glycosylated hemoglobin (Hb A1C)  - POCT microalbumin  - TSH with Reflex    4. Essential hypertension  Elevated. - Basic Metabolic Panel    5. Hypercholesteremia    6. Smokeless tobacco use      7. Arthritis  Chronic pain. Refill as below.   - HYDROcodone-acetaminophen (NORCO) 5-325 MG per tablet; Take 1 tablet by mouth 3 times daily for 30 days. Dispense: 90 tablet; Refill: 0           Controlled Substance Monitoring:    Acute and Chronic Pain Monitoring:   RX Monitoring 6/10/2019   Attestation -   Periodic Controlled Substance Monitoring No signs of potential drug abuse or diversion identified.

## 2019-08-03 PROBLEM — J18.9 PNA (PNEUMONIA): Status: ACTIVE | Noted: 2019-01-01

## 2019-08-03 PROBLEM — J18.9 CAP (COMMUNITY ACQUIRED PNEUMONIA): Status: ACTIVE | Noted: 2019-01-01

## 2019-08-03 PROBLEM — Z87.891 HISTORY OF TOBACCO ABUSE: Status: ACTIVE | Noted: 2019-01-01

## 2019-08-03 PROBLEM — J96.01 ACUTE RESPIRATORY FAILURE WITH HYPOXEMIA (HCC): Status: ACTIVE | Noted: 2019-01-01

## 2019-08-03 PROBLEM — R79.89 ELEVATED LACTIC ACID LEVEL: Status: ACTIVE | Noted: 2019-01-01

## 2019-08-03 NOTE — ED PROVIDER NOTES
[facility]  ED  eMERGENCY dEPARTMENT eNCOUnter      Pt Name: Dian Rahman  MRN: [id number]  Ronigfdominick 10/17/1933  Date of evaluation: 8/3/2019  Provider: Luis Enrique Miller MD    CHIEF COMPLAINT       Chief Complaint   Patient presents with    Shortness of Breath     STARTED YESTERDAY, DOES NOT [facility], ARRIVED ON 3L PER NC, 78% O2 SAT, PT ALSO ARRIVED INCONTINENT OF URINE         HISTORY OF PRESENT ILLNESS   (Location/Symptom, Timing/Onset, Context/Setting, Quality, Duration, Modifying Factors, Severity)  Note limiting factors. Dian Rahman is a 80 y.o. male with complex medical history including COPD he uses BiPAP at night but does not use oxygen during the day who presents with shortness of breath starting early yesterday. On arrival the patient has an oxygen saturation of 78% on room air. He denies any chest pain abdominal pain or hemoptysis. He denies any confusion or altered mental status. He does report soreness of breath and increased work of breathing. He denies cough. Reports his symptoms are severe, ongoing for approximately 24 hours, constant, worsening, exertion worsens his symptoms and rest improves them. HPI    Nursing Notes were reviewed. REVIEW OFSYSTEMS    (2-9 systems for level 4, 10 or more for level 5)     Review of Systems   Constitutional: Positive for fatigue. Negative for appetite change, fever and unexpected weight change. HENT: Negative for facial swelling, trouble swallowing and voice change. Eyes: Negative for photophobia and visual disturbance. Respiratory: Positive for shortness of breath. Negative for wheezing and stridor. Cardiovascular: Positive for leg swelling. Negative for chest pain and palpitations. Gastrointestinal: Negative for abdominal pain, blood in stool, nausea and vomiting. Genitourinary: Negative for difficulty urinating and dysuria. Musculoskeletal: Negative for back pain, gait problem and neck pain. equal bilaterally). He exhibits no tenderness or deformity. Neurological: He is alert and oriented to person, place, and time. No cranial nerve deficit. Skin: Skin is warm and dry. Capillary refill takes less than 2 seconds. Nursing note and vitals reviewed. DIAGNOSTIC RESULTS     EKG:All EKG's are interpreted by the Emergency Department Physician who either signs or Co-signs this chart in the absence of a cardiologist.    The Ekg interpreted by me shows  sinus tachycardia, kaaj=[id number]  Axis is   Normal  QTc is  405ms  Intervals and Durations are unremarkable. ST Segments: nonspecific changes  Nonspecific changes to inferior leads new from previous EKG on 7/4/2016      RADIOLOGY:     Interpretation per the Radiologist below, if available at the time of this note:    XR CHEST STANDARD (2 VW)   Final Result   Right perihilar infiltrate, likely a pneumonia. No overt failure or   significant pleural fluid.                ED BEDSIDE ULTRASOUND:   Performed by ED Physician - none    LABS:  Labs Reviewed   CBC WITH AUTO DIFFERENTIAL - Abnormal; Notable for the following components:       Result Value    WBC 12.2 (*)     Neutrophils # 10.5 (*)     Lymphocytes # 0.5 (*)     All other components within normal limits    Narrative:     Performed at:  [place] [address] [address] [address]   Phone [phone]   COMPREHENSIVE METABOLIC PANEL - Abnormal; Notable for the following components:    Chloride 95 (*)     Glucose 250 (*)     CREATININE 0.6 (*)     AST 14 (*)     All other components within normal limits    Narrative:     Performed at:  [address], [facility] [place] [facility]   Phone [phone]   URINE RT REFLEX TO CULTURE - Abnormal; Notable for the following components:    Blood, Urine LARGE (*)     Protein, UA TRACE (*)     Leukocyte Esterase, Urine TRACE (*)     All other components within normal limits Narrative:     Performed at:  [address], [facility] [place] [facility]   Phone [phone] - Abnormal; Notable for the following components:    Pro-BNP 2,377 (*)     All other components within normal limits    Narrative:     Performed at:  [address], [facility]   Phone [phone]   BLOOD GAS, VENOUS - Abnormal; Notable for the following components:    pO2, Librado 58.4 (*)     All other components within normal limits    Narrative:     Performed at:  [address], [facility] [place]   Phone [phone]   LACTIC ACID, PLASMA - Abnormal; Notable for the following components:    Lactic Acid 2.9 (*)     All other components within normal limits    Narrative:     Performed at:  [address], [facility] [place] [facility]   Phone [phone]   MICROSCOPIC URINALYSIS - Abnormal; Notable for the following components:    RBC, UA  (*)     Bacteria, UA Rare (*)     All other components within normal limits    Narrative:     Performed at:  [address], [facility] [place] [facility]   Phone [phone]   CULTURE BLOOD #1   CULTURE BLOOD #2   URINE CULTURE   TROPONIN    Narrative:     Performed at:  [address], [facility] [place] [facility]   Phone [phone]   LACTIC ACID, PLASMA   URINE RT REFLEX TO CULTURE   LACTIC ACID, PLASMA       All otherlabs were within normal range or not returned as of this dictation.     EMERGENCY DEPARTMENT COURSE and DIFFERENTIAL DIAGNOSIS/MDM:   Vitals:    Vitals:    08/03/19 1435 08/03/19 1436 08/03/19 1453 08/03/19 1516   BP:  (!) 142/77  (!) 143/71   Pulse:  118  115   Resp: 26 (!) 31  28   Temp:       TempSrc:       SpO2: (!) 75% 90%  91%   Weight:       Height:   5' 7\" (minutes):  35    Critical care time was exclusive of:  Separately billable procedures and treating other patients and teaching time    Critical care was necessary to treat or prevent imminent or life-threatening deterioration of the following conditions:  Sepsis and respiratory failure    Critical care was time spent personally by me on the following activities:  Ordering and performing treatments and interventions, development of treatment plan with patient or surrogate, ordering and review of laboratory studies, discussions with consultants, ordering and review of radiographic studies, pulse oximetry, evaluation of patient's response to treatment, re-evaluation of patient's condition, examination of patient and obtaining history from patient or surrogate        FINAL IMPRESSION      1. Acute respiratory failure with hypoxia ([facility] [address]    2. Pneumonia of right lung due to infectious organism, unspecified part of lung    3. Severe sepsis [facility])          DISPOSITION/PLAN   DISPOSITION Admitted 08/03/2019 03:41:28 PM      PATIENT REFERRED TO:  MADY Rodriguez - CNP  [address] [place]  [phone]              (Please note that portions of this note were completed with a voice recognition program.  Efforts were made to edit the dictations but occasionally words aremis-transcribed. )    Yusuf Edmonds MD (electronically signed)  Attending Emergency Physician           Yusuf Edmonds MD  08/03/19 5213

## 2019-08-03 NOTE — H&P
PROSTATE, TRANSURETHRAL RESECTION OF BLADDER TUMOR    CYSTOSCOPY  2/25/15    CYSTOSCOPY N/A 1/20/16    TRANSURETHRAL RESECTION OF THE PROSTATE    CYSTOSCOPY  09/21/2016    CYSTOSCOPY, URETHERAL DILATION      JOINT REPLACEMENT      bilateral knee    OTHER SURGICAL HISTORY  6/6/11    TURP    OTHER SURGICAL HISTORY  2012    cystoscopy with urethral dilation    OTHER SURGICAL HISTORY      Colostomy Reversal    OTHER SURGICAL HISTORY  11/20/2013    cystoscopy, TUR bladder tumor with Holmium laser    OTHER SURGICAL HISTORY  07/22/2015    cystoscopy, urethral dilatation    [place] COLOSTOMY  11/3/10    bowel resect w/colostomy    PROSTATE SURGERY      TURP  2012    TURP  2/20/13       Medications Prior to Admission:      Prior to Admission medications    Medication Sig Start Date End Date Taking? Authorizing Provider   metFORMIN (GLUCOPHAGE-XR) 500 MG extended release tablet TAKE (1) TABLET DAILY WITH BREAKFAST. 7/29/19   Marie Nichols APRN - CNP   HYDROcodone-acetaminophen (NORCO) 5-325 MG per tablet Take 1 tablet by mouth 3 times daily for 30 days.  7/9/19 8/8/19  Marie Nichols APRN - CNP   furosemide (LASIX) 40 MG tablet TAKE 1 TABLET ONCE DAILY 7/9/19   Marie Nichols APRN - CNP   glipiZIDE (GLUCOTROL) 5 MG tablet TAKE (1) TABLET DAILY 5/31/19   Marie Nichols APRN - CNP   tiZANidine (ZANAFLEX) 4 MG tablet TAKE 1 TABLET NIGHTLY 5/15/19   Marie Nichols APRN - CNP   atorvastatin (LIPITOR) 40 MG tablet TAKE (1) TABLET DAILY 5/15/19   Marie Nichols APRN - CNP   potassium chloride (KLOR-CON M) 10 MEQ extended release tablet TAKE 1 TABLET EVERY DAY 5/14/19   Marie Nichols APRN - CNP   buPROPion (WELLBUTRIN XL) 150 MG extended release tablet TAKE 1 TABLET EVERY MORNING 3/18/19   Marie Nichols, APRN - CNP   fenofibrate micronized (LOFIBRA) 200 MG capsule TAKE 1 CAPSULE BY MOUTH EVERY MORNING BEFORE BREAKFAST 2/27/19   Marie Nichols, APRN - [place]   lisinopril bilaterally. Full range of motion without deformity. BLE edema 1+  Skin: Skin color, texture, turgor normal.  No rashes or lesions. Neurologic:  Neurovascularly intact without any focal sensory/motor deficits. Cranial nerves: II-XII intact, grossly non-focal.  Psychiatric:  Alert and oriented, thought content appropriate, normal insight  Capillary Refill: Brisk,< 3 seconds   Peripheral Pulses: +2 palpable, equal bilaterally       Labs:     Recent Labs     08/03/19  1338   WBC 12.2*   HGB 15.6   HCT 47.1        Recent Labs     08/03/19  1338      K 3.9   CL 95*   CO2 29   BUN 20   CREATININE 0.6*   CALCIUM 9.5     Recent Labs     08/03/19  1338   AST 14*   ALT 21   BILITOT 0.4   ALKPHOS 46     No results for input(s): INR in the last 72 hours. Recent Labs     08/03/19  1338   TROPONINI 0.01       Urinalysis:      Component Value    NITRU Negative    WBCUA 3-5    BACTERIA Rare    RBCUA     BLOODU LARGE    SPECGRAV 1.020    GLUCOSEU Negative    GLUCOSEU NEGATIVE       Radiology:     CXR: I have reviewed the CXR with the following interpretation: Right perihilar infiltrate  EKG:  I have reviewed the EKG with the following interpretation: Sinus tachycardia    XR CHEST STANDARD (2 VW)   Final Result   Right perihilar infiltrate, likely a pneumonia. No overt failure or   significant pleural fluid.              ASSESSMENT:PLAN:      Sepsis due to Right lung CAP (community acquired pneumonia)  Presented with non productive minimal cough/ shortness of breath/tachycardia/ leukocytosis/tachypnea/severe hypoxia, lactic 2.9  Chest imaging reveals infiltrates on the right perihilar region  - Admit to IP  - IV rocephin and doxycycline -started on 8/3/2019  - sputum culture/gram, blood c/s, strep pneumo and legionella urine ag  - Acapella/I-S  - supplemental O2 to keep sats >92%, currently on 15 L oxygen  - MRSA swab, influenza a/b swab  - bipap  - lactic 2.9, 2 L bolus given in er with lactic now 2.2      Acute respiratory failure with hypoxemia  2/2 pna  ABG reviewed  -supplemental O2 to keep sats >92%, currently on 15 L oxygen  -bipap trial for increased work of breathing, uses trilogy AVAPS at home  -bronchodilators      History of tobacco abuse  -Significant smoking history about 60 pack years       COPD  Follows up with Dr. Deidre Redding with [facility] pulmonology  Resume bronchodilators      dCHF  - BNP high, leg edema  - echo pending, cautious fluids      Morbid obesity due to excess calories   Complicating assessment and treatment, placing patient at high risk for multiple co-morbidities as well as early death and contributing to the patient's presentation. Counseled on weight loss.  GENESIS on trilogy avaps - resume bipap at night      Essential hypertension [I10] -controlled, resume lisinopril      Type 2 diabetes mellitus without complication, without long-term current use of insulin   -Controlled, resume metformin and glipizide, add SSI with Accu-Cheks before meals and at bedtime      Hypercholesteremia [E78.00]- -resume statin and fenofibrate     DVT Prophylaxis: Lovenox  Diet: Carb controlled  Code Status: Full code  PT/OT Eval Status: Not consulted    Dispo -IP stay 2-3 midnights anticipated       Shannon Draper MD    Thank you MADY Chang - O[facility]EL for the opportunity to be involved in this patient's care. If you have any questions or concerns please feel free to contact me at [phone].

## 2019-08-03 NOTE — PROGRESS NOTES
frequency of the bronchodilator can be decreased, based on the patient's RSI, but not less than home treatment regimen frequency. 5. Bronchodilator(s) will be discontinued if patient has a RSI less than 9 and has received no scheduled or as needed treatment for 72  Hrs. Patients Ordered on a Mucolytic Agent:    1. Must always be administered with a bronchodilator. 2. Discontinue if patient experiences worsened bronchospasm, or secretions have lessened to the point that the patient is able to clear them with a cough. Anti-inflammatory and Combination Medications:    1. If the patient lacks prior history of lung disease, is not using inhaled anti-inflammatory medication at home, and lacks wheezing by examination or by history for at least 24 hours, contact physician for possible discontinuation.

## 2019-08-04 PROBLEM — J98.11 ATELECTASIS: Status: ACTIVE | Noted: 2019-01-01

## 2019-08-04 PROBLEM — J90 PLEURAL EFFUSION: Status: ACTIVE | Noted: 2019-01-01

## 2019-08-04 NOTE — CONSULTS
INPATIENT PULMONARY CRITICAL CARE CONSULT NOTE      Chief Complaint/Referring Provider:  Patient is being seen at the request of Dr. Sandoval Schrader  for a consultation for sepsis/pneumonia       Presenting HPI:  Patient came to the hospital with increased shortness of breath     As per ER physician -Domenico Garcia is a 80 y.o. male with complex medical history including COPD he uses BiPAP at night but does not use oxygen during the day who presents with shortness of breath starting early yesterday. On arrival the patient has an oxygen saturation of 78% on room air. He denies any chest pain abdominal pain or hemoptysis. He denies any confusion or altered mental status. He does report shortness of breath and increased work of breathing. He denies cough.   Reports his symptoms are severe, ongoing for approximately 24 hours, constant, worsening, exertion worsens his symptoms and rest improves them.     Patient on questioning states that he has been in his usual state of health until about 2 days back and started having increased coughing without any significant expectoration along with that patient started having increasing shortness of breath and patient states that he was having increased work of breathing, patient was also having some nonspecific chest soreness but no chest pain per se, patient does not have any significant increasing nasal congestion, patient did not have any sore throat or any difficulty in swallowing, no coughing or choking while eating, patient was not having any significant odynophagia or dysphagia, patient was having feverish feeling, patient did not have any overt chest pain which is pleuritic in nature, patient did not have any increasing abdominal pain nausea vomiting, patient did not have any dysuria or hematuria, patient has leg edema which is not more than usual, patient did not have any recent change in ambient environment or any sick contacts, patient did not have any exposure to any 228 (H)    VLDL Cholesterol Calculated Latest Ref Range: Not Established mg/dL 39 46    Troponin Latest Ref Range: <0.01 ng/mL   0.01     Results for Zuleima Ledezma (MRN [id number]) as of 8/3/2019 20:21   Ref. Range 7/9/2018 11:01 10/9/2018 10:56 2/7/2019 14:01 8/3/2019 13:38   Albumin Latest Ref Range: 3.4 - 5.0 g/dL 4.1 4.1 4.2 4.1   Globulin Latest Units: g/dL 2.6 2.4 2.4 3.5   Albumin/Globulin Ratio Latest Ref Range: 1.1 - 2.2  1.6 1.7 1.8 1.2   Alk Phos Latest Ref Range: 40 - 129 U/L 35 (L) 33 (L) 39 (L) 46   ALT Latest Ref Range: 10 - 40 U/L 27 26 28 21   AST Latest Ref Range: 15 - 37 U/L 22 21 23 14 (L)   Bilirubin Latest Ref Range: 0.0 - 1.0 mg/dL 0.4 0.4 0.3 0.4   Total Protein Latest Ref Range: 6.4 - 8.2 g/dL 6.7 6.5 6.6 7.6     Results for Zuleima Ledezma (MRN [id number]) as of 8/3/2019 20:21   Ref.  Range 8/3/2019 13:36   Color, UA Latest Ref Range: Straw/Yellow  Yellow   Clarity, UA Latest Ref Range: Clear  Clear   Glucose, UA Latest Ref Range: Negative mg/dL Negative   Bilirubin, Urine Latest Ref Range: Negative  Negative   Ketones, Urine Latest Ref Range: Negative mg/dL Negative   Specific Gravity, UA Latest Ref Range: 1.005 - 1.030  1.020   Blood, Urine Latest Ref Range: Negative  LARGE (A)   pH, UA Latest Ref Range: 5.0 - 8.0  5.5   Protein, UA Latest Ref Range: Negative mg/dL TRACE (A)   Urobilinogen, Urine Latest Ref Range: <2.0 E.U./dL 0.2   Nitrite, Urine Latest Ref Range: Negative  Negative   Leukocyte Esterase, Urine Latest Ref Range: Negative  TRACE (A)   Urine Type Unknown Not Specified   Urine Reflex to Culture Unknown Yes   WBC, UA Latest Ref Range: 0 - 5 /HPF 3-5   RBC, UA Latest Ref Range: 0 - 2 /HPF  (A)   Epi Cells Latest Units: /HPF 3-5   Bacteria, UA Latest Units: /HPF Rare (A)   Microscopic Examination Unknown YES   URINE RT REFLEX TO CULTURE Unknown Rpt (A)       TWO XRAY VIEWS OF THE CHEST       8/3/2019 1:36 pm       COMPARISON:   03/29/2013.       HISTORY: Panlobular emphysema (HCC)    GENESIS on CPAP    Morbid obesity due to excess calories ([address]    CAP (community acquired pneumonia)    Acute respiratory failure with hypoxemia ([address]    History of tobacco abuse    PNA (pneumonia)    DM (diabetes mellitus), secondary uncontrolled (HCC)    Elevated lactic acid level  Resolved Problems:    * No resolved hospital problems.  *          Plan:   · Oxygen supplementation to keep saturation between 90 to 94% only  · Patient's home trilogy to be given to him at nighttime and when he is taking a nap in the daytime or on PRN basis  · Patient does not have any hypercarbic respiratory at this time and does not need any BiPAP on a regular basis but now  · Patient and family were shown the x-ray chest along with findings along with differential diagnosis  · Patient has been given 1 dose of IV Zithromax along with that patient has been started on Rocephin and doxycycline which can be continued  · Patient has a large Aa gradient which may be not explainable by simple pneumonia  · Will repeat an x-ray chest and ABG in the morning on the trilogy and reassess  · If patient is not clinically better or has worsening of clinical status we will consider CT chest plus minus bronchoscopy  · Bronchodilators  · Blood glucose monitoring with sliding insulin  · Patient is on p.o. glipizide and Glucophage which has been continued by the internal medicine team while the patient in the hospital-I am to decide upon his continuation of the inpatient  · Patient will benefit from insulin therapy in the long run  · No need for any steroids at this time  · Patient is on chronic narcotic therapy and needs to monitor for any hypoventilation and hypercarbia  · Lactic acid needs to be trended by the IM team  · PUD and DVT prophylaxis    Patient's further treatment depending on patient's clinical status and the follow-up on above recommendations along with labs and repeat x-ray chest in the morning    Case discussed

## 2019-08-04 NOTE — FLOWSHEET NOTE
08/03/19 2020   Assessment   Charting Type Shift assessment   Neurological   Neuro (WDL) WDL   Level of Consciousness 0   Swallow Screening   Is the patient able to remain alert for testing? Yes   Was the Patient Eating a Modified Diet Prior to being Admitted? No   [place] Coma Scale   Eye Opening 4   Best Verbal Response 5   Best Motor Response 6   [place] Coma Scale Score 15   NIH/MNHISS Stroke Scale   NIH/MNIHSS Stroke Scale Assessed No   HEENT   Right Eye Intact   Left Eye Intact   Right Ear Impaired hearing   Left Ear Impaired hearing   Teeth Missing teeth   Mucous Membrane Moist;Pink; Intact   Respiratory   Respiratory Pattern Regular   Respiratory Depth Deep   Respiratory Quality/Effort Labored;Dyspnea with exertion;Dyspnea at rest   Chest Assessment Chest expansion symmetrical   Breath Sounds   Right Upper Lobe Diminished   Right Middle Lobe Fine Crackles   Right Lower Lobe Fine Crackles   Left Upper Lobe Diminished   Left Lower Lobe Fine Crackles   Cardiac   Cardiac Regularity Regular   Heart Sounds S1, S2   Cardiac Rhythm ST  (on tele  monitor)   Rhythm Interpretation   Pulse 108   Gastrointestinal   Abdomen Inspection Distended;Rounded; Soft   Last BM (including prior to admit) 08/03/19   RUQ Bowel Sounds Active   LUQ Bowel Sounds Active   RLQ Bowel Sounds Active   LLQ Bowel Sounds Active   Peripheral Vascular   RLE Edema +2;Pitting   LLE Edema +2;Pitting   Skin Color/Condition   Skin Color Appropriate for ethnicity   Skin Condition/Temp Dry;Poor turgor; Warm   Skin Integrity   Skin Integrity Abrasion;Bruising   Location scattered   Musculoskeletal   RUE Full movement   LUE Full movement   RL Extremity Swelling;Limited movement   LL Extremity Swelling;Limited movement   Genitourinary   Genitourinary (WDL) WDL   Flank Tenderness No   Suprapubic Tenderness No   Dysuria SENIA   Anus/Rectum   Anus/Rectum (WDL) WDL   Psychosocial   Patient Behaviors Anxious; Cooperative

## 2019-08-04 NOTE — OP NOTE
[facility]                                OPERATIVE REPORT    PATIENT NAME: Cynthia Villarreal                 :        10/17/1933  MED REC NO:   [id number]                          ROOM:       9271  ACCOUNT NO:   [de-identified]                           ADMIT DATE: 2019  PROVIDER:     Brenton Epps MD    DATE OF PROCEDURE:  2019    PREOPERATIVE DIAGNOSES:  Acute saddle pulmonary embolus with cor  pulmonale. 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 PROCEDURE:  1. Ultrasound-guided right femoral venous access x2.  2.  Insertion of catheters into the right and left pulmonary arteries. 3.  Institution of pulmonary artery thrombolysis via bilateral pulmonary  artery catheters. ANESTHESIA:  Local with monitored anesthesia with monitoring provided  for 30 minutes. INDICATIONS:  The patient is an 51-year-old male presented with acute  onset of severe shortness of breath. CT pulmonary angiogram  demonstrated saddle PE with evidence of right ventricular strain (cor  pulmonale). The patient is brought to the angio suite at this time to  undergo institution of pulmonary artery thrombolysis. PROCEDURE:  The patient was brought to the angio suite, placed in supine  position. Monitors placed, IV sedation administered using incremental  doses of intravenous Versed. The patient was monitored for 30 minutes  by dedicated monitoring nurse using EKG, blood pressure and pulse  oximetry. The groins were prepped and draped in sterile fashion,  ultrasound was performed of the right femoral region. The right common  femoral vein was noted to be completely compressible and free of  thrombus. The skin and subcutaneous tissues were infiltrated with 1%  lidocaine and the femoral vein was accessed using ultrasound guidance  with two micropuncture sheath.   Digital copy of the ultrasound image was  saved and placed in the patient's record. Through the micropuncture  sheath, Bentson wires were inserted. The micropuncture sheaths were  removed and replaced with 6-Vietnamese 23 cm BriteTip sheath which were  placed in the iliac vein over the Bentson wires. Pigtail catheter was  then used over each wire to cannulate the right and left pulmonary  arteries. With the pigtail catheter in the main pulmonary arteries, the  pigtail was removed and replaced with an angled glide catheter which was  used to direct the wires into the more distal right and left pulmonary  arteries. The 18 cm infusion length EKOS infusion catheters were then  advanced over the Bentson wires which were then removed and the  ultrasound wires were placed within the EKOS catheters. The catheters  and sheaths were secured to skin. Institution of pulmonary artery  thrombolysis then commenced. Sterile dressings were applied to the  sheath. The patient was then transferred to his hospital bed and to the  ICU in stable condition.         Tricia Clemente MD    D: 08/04/2019 12:48:34       T: 08/04/2019 12:50:23     [email or web address]  Job#: [id number]     Doc#: [id number]    CC:

## 2019-08-04 NOTE — PLAN OF CARE
Problem: Pain:  Goal: Pain level will decrease  Description  Pain level will decrease  Outcome: Ongoing  Goal: Control of acute pain  Description  Control of acute pain  Outcome: Ongoing     Problem: Falls - Risk of:  Goal: Will remain free from falls  Description  Will remain free from falls  Outcome: Ongoing     Problem: Discharge Planning:  Goal: Discharged to appropriate level of care  Description  Discharged to appropriate level of care  Outcome: Ongoing  Goal: Participates in care planning  Description  Participates in care planning  Outcome: Ongoing     Problem: Airway Clearance - Ineffective:  Goal: Clear lung sounds  Description  Clear lung sounds  Outcome: Ongoing  Goal: Ability to maintain a clear airway will improve  Description  Ability to maintain a clear airway will improve  Outcome: Ongoing     Problem: Fluid Volume - Deficit:  Goal: Achieves intake and output within specified parameters  Description  Achieves intake and output within specified parameters  Outcome: Ongoing     Problem: Gas Exchange - Impaired:  Goal: Levels of oxygenation will improve  Description  Levels of oxygenation will improve  Outcome: Ongoing     Problem: Hyperthermia:  Goal: Ability to maintain a body temperature in the normal range will improve  Description  Ability to maintain a body temperature in the normal range will improve  Outcome: Ongoing

## 2019-08-04 NOTE — PROGRESS NOTES
Hospitalist Progress Note      PCP: Coleen Ruano, APRN - CNP    Date of Admission: 8/3/2019    Chief Complaint: Shortness of breath    Hospital Course:   81 yo WM p/w resp failure and suspected copd exaccerbation. pulm consulted. CTPA done and noted saddle embolus and transferred to icu. vasc surg consulted. Subjective: denies sob/cp currently, on bipap, being transferred to icu now       Medications:  Reviewed    Infusion Medications    dextrose      sodium chloride 75 mL/hr at 08/04/19 0628     Scheduled Medications    atorvastatin  40 mg Oral Nightly    insulin lispro  0-6 Units Subcutaneous TID WC    insulin lispro  0-3 Units Subcutaneous Nightly    buPROPion  150 mg Oral Daily    glipiZIDE  5 mg Oral QAM AC    HYDROcodone-acetaminophen  1 tablet Oral TID    lisinopril  20 mg Oral Daily    metFORMIN  500 mg Oral Daily with breakfast    tiZANidine  4 mg Oral Nightly    sodium chloride flush  10 mL Intravenous 2 times per day    enoxaparin  40 mg Subcutaneous Daily    cefTRIAXone (ROCEPHIN) IV  1 g Intravenous Q24H    And    doxycycline (VIBRAMYCIN) IV  100 mg Intravenous Q12H    ipratropium-albuterol  1 ampule Inhalation Q4H     PRN Meds: glucose, dextrose, glucagon (rDNA), dextrose, sodium chloride flush, magnesium hydroxide, ondansetron, acetaminophen      Intake/Output Summary (Last 24 hours) at 8/4/2019 0857  Last data filed at 8/4/2019 7966  Gross per 24 hour   Intake 1008.75 ml   Output 350 ml   Net 658.75 ml       Physical Exam Performed:    /82   Pulse 103   Temp 98.8 °F (37.1 °C) (Axillary)   Resp 24   Ht 5' 7\" (1.702 m)   Wt 287 lb 7.7 oz (130.4 kg)   SpO2 96%   BMI 45.03 kg/m²   General appearance: Ill-appearing, morbidly obese , moderate respiratory distress, appears stated age and cooperative. HEENT:  Normal cephalic, atraumatic without obvious deformity. Pupils equal, round, and reactive to light. Extra ocular muscles intact.  Conjunctivae/corneas clear.  Neck: Supple, with full range of motion. No jugular venous distention. Trachea midline. Respiratory: Increased respiratory effort. Diminished breath sounds bilaterally   Cardiovascular: Tachycardic, regular rhythm with normal S1/S2 without murmurs, rubs or gallops. Abdomen: Soft, non-tender, non-distended with normal bowel sounds. Musculoskeletal:  No clubbing, cyanosis  bilaterally. Full range of motion without deformity. BLE edema 1+  Skin: Skin color, texture, turgor normal.  No rashes or lesions. Neurologic:  Neurovascularly intact without any focal sensory/motor deficits. Cranial nerves: II-XII intact, grossly non-focal.  Psychiatric:  Alert and oriented, thought content appropriate, normal insight  Capillary Refill: Brisk,< 3 seconds   Peripheral Pulses: +2 palpable, equal bilaterally        Labs:   Recent Labs     08/03/19  1338 08/04/19  0526   WBC 12.2* 11.7*   HGB 15.6 13.9   HCT 47.1 41.9    242     Recent Labs     08/03/19  1338 08/04/19  0526    140   K 3.9 3.9   CL 95* 101   CO2 29 29   BUN 20 13   CREATININE 0.6* <0.5*   CALCIUM 9.5 8.7     Recent Labs     08/03/19  1338   AST 14*   ALT 21   BILITOT 0.4   ALKPHOS 46     No results for input(s): INR in the last 72 hours. Recent Labs     08/03/19  1338 08/03/19  1859 08/04/19  0018   TROPONINI 0.01 0.02* <0.01       Urinalysis:      Lab Results   Component Value Date    NITRU Negative 08/03/2019    WBCUA 3-5 08/03/2019    BACTERIA Rare 08/03/2019    RBCUA  08/03/2019    BLOODU LARGE 08/03/2019    SPECGRAV 1.020 08/03/2019    GLUCOSEU Negative 08/03/2019    GLUCOSEU NEGATIVE 11/17/2010       Radiology:  XR CHEST PORTABLE   Final Result   Persistent right perihilar airspace disease, which can reflect pneumonia or   asymmetric edema. Radiopaque device of uncertain etiology projects over the left apex.    Correlate with physical exam.         XR CHEST STANDARD (2 VW)   Final Result   Right perihilar infiltrate, likely

## 2019-08-04 NOTE — PROGRESS NOTES
Pulmonary & Critical Care Medicine ICU Progress Note    66-year-old male with history of HTN, DM 2, CAD, COPD/emphysema, GENESIS on Trilogy AVAPS, narcolepsy, morbid obesity, past bladder carcinoma, past melanoma admitted with acute hypoxemic respiratory failure presumed to be due to right lung community-acquired pneumonia. He was placed on Rocephin and doxycycline on 8/3/2019. He was placed on BiPAP, received IV fluids for lactic acidosis. He could not tolerate his Trilogy. Due to hypoxemia out of proportion to imaging findings, he underwent CT chest PE protocol. This showed bilateral pulmonary emboli, acute cor pulmonale. He underwent catheter directed thrombolysis. The patient follows with Dr. Sameer Murphy at [facility]    Events of Last 24 hours: The patient remained on high flow oxygen, underwent a CT chest with PE protocol that showed saddle pulmonary embolism. He was evaluated by Dr. Lashawn Arriaga, felt to be a candidate for catheter directed thrombolysis which was completed. The sheath was removed early this morning, there was some oozing. Patient says his breathing has improved, though not back to baseline. Recent Labs     08/04/19  0745   PHART 7.421   BTU1RCJ 45.4*   PO2ART 66.2*         IV:     heparin (porcine) Stopped (08/04/19 2000)    And    heparin (porcine) Stopped (08/04/19 2000)    sodium chloride Stopped (08/04/19 2000)    And    sodium chloride Stopped (08/04/19 2000)    sodium chloride      heparin (porcine) 18.3 mL/hr (08/05/19 0615)    dextrose         Vitals:  /61   Pulse 93   Temp 99.4 °F (37.4 °C) (Axillary)   Resp 24   Ht 5' 7\" (1.702 m)   Wt 287 lb 7.7 oz (130.4 kg)   SpO2 90%   BMI 45.03 kg/m²          Intake/Output Summary (Last 24 hours) at 8/5/2019 0904  Last data filed at 8/4/2019 1836  Gross per 24 hour   Intake --   Output 450 ml   Net -450 ml       EXAM:  General: Awake and oriented, conversant. No distress. Eyes: PERRL. No sclera icterus.  No

## 2019-08-04 NOTE — PROGRESS NOTES
months, then CT at 18-24 months. Nodule size greater than 8 mm         In a low-risk patient, consider CT at 3 months, PET/CT, or tissue sampling. In a high-risk patient, consider CT at 3 months, PET/CT, or tissue sampling. Multiple Solid Nodules:      Nodule size less than 6 mm   In a low-risk patient, no routine follow-up. In a high-risk patient, optional CT at 12 months. Nodule size equals 6-8 mm   In a low-risk patient, CT at 3-6 months, then consider CT at 18-24 months. In a high-risk patient, CT at 3-6 months, then CT at 18-24 months. Nodule size greater than 8 mm   In a low-risk patient, CT at 3-6 months, then consider CT at 18-24 months. In a high-risk patient, CT at 3-6 months, then CT at 18-24 months. - Low risk patients include individuals with minimal or absent history of   smoking and other known risk factors. - High risk patients include individuals with a history or smoking or known   risk factors. Radiology 2017 http://pubs. rsna.org/doi/full/10.1148/radiol. 2857852172         XR CHEST PORTABLE   Final Result   Persistent right perihilar airspace disease, which can reflect pneumonia or   asymmetric edema. Radiopaque device of uncertain etiology projects over the left apex. Correlate with physical exam.         XR CHEST STANDARD (2 VW)   Final Result   Right perihilar infiltrate, likely a pneumonia. No overt failure or   significant pleural fluid. Xr Chest Standard (2 Vw)    Result Date: 8/3/2019  EXAMINATION: TWO XRAY VIEWS OF THE CHEST 8/3/2019 1:36 pm COMPARISON: 03/29/2013. HISTORY: ORDERING SYSTEM PROVIDED HISTORY: SHORNTESS OF BREATH TECHNOLOGIST PROVIDED HISTORY: Reason for exam:->SHORNTESS OF BREATH FINDINGS: The cardiomediastinal silhouette is stable. Aortic vascular calcification. Right perihilar infiltrate with elevation of the right hemidiaphragm. The lungs otherwise are clear.   There is no evidence of overt failure Filling defects are seen within pulmonary arterial branches to all lobes. The right ventricle is larger than the left, suggestive of right heart strain. Mediastinum: Calcified and soft plaque within the thoracic aorta. Coronary artery calcification. Calcified subcarinal lymph nodes, in keeping with granulomatous disease. Shotty noncalcified mediastinal lymph nodes. Calcified loose bodies about the right shoulder. Lungs/pleura: Moderate right pleural effusion. Compressive atelectasis of the posterior right lower lobe. Patchy heterogeneous and ground-glass opacity is seen within the right upper, middle, lower lobes. Noncalcified 5 mm left upper lobe pulmonary nodule on series 2, image 51. No significant left pleural effusion. No pneumothorax. Upper Abdomen: No acute process in the uppermost abdomen. Soft Tissues/Bones: Degenerative change of the spine. Extensive bilateral pulmonary embolism, including saddle embolism. Enlargement of the right ventricle, compatible with right heart strain. Moderate right pleural effusion. Partial atelectasis of the right lower lobe. Additional patchy heterogeneous and ground-glass opacity can reflect developing infarct or pneumonitis. 5 mm left upper lobe pulmonary nodule, for which continued follow-up is recommended. Findings were discussed with Duran Zimmer at 10:19 on 8/4/2019. RECOMMENDATIONS: Fleischner Society guidelines for follow-up and management of incidentally detected pulmonary nodules: Single Solid Nodule: Nodule size less than 6 mm In a low-risk patient, no routine follow-up. In a high-risk patient, optional CT at 12 months. Nodule size equals 6-8 mm In a low-risk patient, CT at 6-12 months, then consider CT at 18-24 months. In a high-risk patient, CT at 6-12 months, then CT at 18-24 months. Nodule size greater than 8 mm In a low-risk patient, consider CT at 3 months, PET/CT, or tissue sampling.  In a high-risk patient, consider CT at 3 months, PET/CT, or tissue sampling. Multiple Solid Nodules: Nodule size less than 6 mm In a low-risk patient, no routine follow-up. In a high-risk patient, optional CT at 12 months. Nodule size equals 6-8 mm In a low-risk patient, CT at 3-6 months, then consider CT at 18-24 months. In a high-risk patient, CT at 3-6 months, then CT at 18-24 months. Nodule size greater than 8 mm In a low-risk patient, CT at 3-6 months, then consider CT at 18-24 months. In a high-risk patient, CT at 3-6 months, then CT at 18-24 months. - Low risk patients include individuals with minimal or absent history of smoking and other known risk factors. - High risk patients include individuals with a history or smoking or known risk factors. Results for Primo Edgar (MRN [id number]) as of 8/4/2019 12:31   Ref. Range 8/3/2019 13:35 8/3/2019 13:38 8/3/2019 15:29 8/3/2019 16:56 8/3/2019 18:54 8/3/2019 19:40 8/4/2019 05:26   Sodium Latest Ref Range: 136 - 145 mmol/L  137     140   Potassium Latest Ref Range: 3.5 - 5.1 mmol/L  3.9     3.9   Chloride Latest Ref Range: 99 - 110 mmol/L  95 (L)     101   CO2 Latest Ref Range: 21 - 32 mmol/L  29     29   BUN Latest Ref Range: 7 - 20 mg/dL  20     13   Creatinine Latest Ref Range: 0.8 - 1.3 mg/dL  0.6 (L)     <0.5 (L)   Anion Gap Latest Ref Range: 3 - 16   13     10   GFR Non- Latest Ref Range: >60   >60     >60   GFR  Latest Ref Range: >60   >60     >60   Lactic Acid Latest Ref Range: 0.4 - 2.0 mmol/L 2.9 (H)  2.2 (H)  2.2 (H)  1.5   Glucose Latest Ref Range: 70 - 99 mg/dL  250 (H)     191 (H)   POC Glucose Latest Ref Range: 70 - 99 mg/dl    194 (H)  189 (H)    Calcium Latest Ref Range: 8.3 - 10.6 mg/dL  9.5     8.7   Total Protein Latest Ref Range: 6.4 - 8.2 g/dL  7.6          Results for Primo Edgar (MRN [id number]) as of 8/4/2019 12:31   Ref.  Range 8/3/2019 13:38 8/3/2019 18:59 8/4/2019 00:18   Pro-BNP Latest Ref Range: 0 - 449 pg/mL 2,377 (H)     Troponin Latest Ref Range: <0.01 ng/mL 0.01 0.02 (H) <0.01     Results for Abelino Chol (MRN [id number]) as of 8/4/2019 12:31   Ref. Range 2/7/2019 14:01 8/3/2019 13:38 8/4/2019 05:26   WBC Latest Ref Range: 4.0 - 11.0 K/uL 6.4 12.2 (H) 11.7 (H)   RBC Latest Ref Range: 4.20 - 5.90 M/uL 4.51 5.04 4.49   Hemoglobin Quant Latest Ref Range: 13.5 - 17.5 g/dL 14.2 15.6 13.9   Hematocrit Latest Ref Range: 40.5 - 52.5 % 43.0 47.1 41.9   MCV Latest Ref Range: 80.0 - 100.0 fL 95.4 93.4 93.3   MCH Latest Ref Range: 26.0 - 34.0 pg 31.5 31.0 30.9   MCHC Latest Ref Range: 31.0 - 36.0 g/dL 33.1 33.1 33.1   MPV Latest Ref Range: 5.0 - 10.5 fL 8.1 7.5 7.5   RDW Latest Ref Range: 12.4 - 15.4 % 13.9 14.2 14.3   Platelet Count Latest Ref Range: 135 - 450 K/uL 309 289 242   Neutrophils % Latest Units: % 73.3 86.2 79.8   Lymphocyte % Latest Units: % 15.2 4.5 8.3   Monocytes % Latest Units: % 8.5 9.0 11.5   Results for Abelino Chol (MRN [id number]) as of 8/4/2019 12:31   Ref.  Range 8/3/2019 13:38 8/4/2019 07:45   Hemoglobin, Art, Extended Latest Ref Range: 13.5 - 17.5 g/dL  14.6   pH, Arterial Latest Ref Range: 7.350 - 7.450   7.421   pCO2, Arterial Latest Ref Range: 35.0 - 45.0 mmHg  45.4 (H)   pO2, Arterial Latest Ref Range: 75.0 - 108.0 mmHg  66.2 (L)   HCO3, Arterial Latest Ref Range: 21.0 - 29.0 mmol/L  28.9   TCO2 (calc), Art Latest Ref Range: Not Established mmol/L  30.2   Base Excess, Arterial Latest Ref Range: -3.0 - 3.0 mmol/L  3.7 (H)   O2 Sat, Arterial Latest Ref Range: >92 %  93.7   O2 Content, Arterial Latest Ref Range: Not Established mL/dL  19   Methemoglobin, Arterial Latest Ref Range: <1.5 %  0.5   Carboxyhgb, Arterial Latest Ref Range: 0.0 - 1.5 %  0.6   pH, Librado Latest Ref Range: 7.350 - 7.450  7.360    pCO2, Librado Latest Ref Range: 40.0 - 50.0 mmHg 49.8    pO2, Librado Latest Ref Range: 25.0 - 40.0 mmHg 58.4 (H)    HCO3, Venous Latest Ref Range: 23.0 - 29.0 mmol/L 27.5    TC02 (Calc), Librado Latest Ref Range: Not Established mmol/L

## 2019-08-04 NOTE — PROGRESS NOTES
08/03/19 2333   NIV Type   Equipment Type v60   Mode CPAP   Mask Type Full face mask   Mask Size Large   Settings/Measurements   Comfort Level Good   Using Accessory Muscles No   CPAP 13 cmH2O   Resp 25   SpO2 90   FiO2  60 %   Vt Exhaled 612 mL   Mask Leak (lpm) 26 lpm   Skin Protection for O2 Device Yes   Breath Sounds   Right Upper Lobe Diminished   Right Middle Lobe Diminished   Right Lower Lobe Diminished   Left Upper Lobe Diminished   Left Lower Lobe Diminished   Alarm Settings   Alarms On Y   Press Low Alarm 6 cmH2O   High Pressure Alarm 25 cmH2O   Delay Alarm 20 sec(s)   Resp Rate Low Alarm 6   High Respiratory Rate 45 br/min   Oxygen Therapy/Pulse Ox   SpO2 90 %

## 2019-08-04 NOTE — PROGRESS NOTES
Patient back from Vascular surgery, patient awake, alert and oriented x4, on continuous BiPAP, right femoral site is minimal drainage, continuing to monitor, John Carlson RN

## 2019-08-04 NOTE — PLAN OF CARE
Problem: Pain:  Goal: Pain level will decrease  Description  Pain level will decrease  8/4/2019 0917 by Tamanna Hansno RN  Outcome: Ongoing  8/4/2019 0642 by Conchita Tucker RN  Outcome: Ongoing  Goal: Control of acute pain  Description  Control of acute pain  8/4/2019 0917 by Tamanna Hanson RN  Outcome: Ongoing  8/4/2019 0642 by Conchita Tucker RN  Outcome: Ongoing  Goal: Control of chronic pain  Description  Control of chronic pain  Outcome: Ongoing     Problem: Falls - Risk of:  Goal: Will remain free from falls  Description  Will remain free from falls  8/4/2019 0917 by Tamanna Hanson RN  Outcome: Ongoing  8/4/2019 0642 by Conchita Tucker RN  Outcome: Ongoing  Goal: Absence of physical injury  Description  Absence of physical injury  Outcome: Ongoing     Problem: Discharge Planning:  Goal: Discharged to appropriate level of care  Description  Discharged to appropriate level of care  8/4/2019 0917 by Tamanna Hanson RN  Outcome: Ongoing  8/4/2019 0642 by Conchita Tucker RN  Outcome: Ongoing  Goal: Participates in care planning  Description  Participates in care planning  8/4/2019 Nagi Brown 45 by Tamanna Hanson RN  Outcome: Ongoing  8/4/2019 0642 by Conchita Tucker RN  Outcome: Ongoing     Problem: Airway Clearance - Ineffective:  Goal: Clear lung sounds  Description  Clear lung sounds  8/4/2019 0917 by Tamanna Hanson RN  Outcome: Ongoing  8/4/2019 0642 by Conchita Tucker RN  Outcome: Ongoing  Goal: Ability to maintain a clear airway will improve  Description  Ability to maintain a clear airway will improve  8/4/2019 0917 by Tamanna Hanson RN  Outcome: Ongoing  8/4/2019 0642 by Conchita Tucker RN  Outcome: Ongoing     Problem: Fluid Volume - Deficit:  Goal: Achieves intake and output within specified parameters  Description  Achieves intake and output within specified parameters  8/4/2019 0917 by Tamanna Hanson RN  Outcome: Ongoing  8/4/2019 0642 by Conchita Tucker RN  Outcome: Ongoing     Problem: Gas Exchange - Impaired:  Goal: Levels of oxygenation will improve  Description  Levels of oxygenation will improve  8/4/2019 0917 by Jamari Vela RN  Outcome: Ongoing  8/4/2019 0642 by Drew Dai RN  Outcome: Ongoing     Problem: Hyperthermia:  Goal: Ability to maintain a body temperature in the normal range will improve  Description  Ability to maintain a body temperature in the normal range will improve  8/4/2019 0917 by Jamari Vela RN  Outcome: Ongoing  8/4/2019 0642 by Drew Dai RN  Outcome: Ongoing

## 2019-08-04 NOTE — PROGRESS NOTES
08/04/19 0432   NIV Type   Equipment Type v60   Mode CPAP   Mask Type Full face mask   Settings/Measurements   Comfort Level Good   Using Accessory Muscles No   CPAP 13 cmH2O   Resp 23   SpO2 90   FiO2  60 %   Vt Exhaled 725 mL   Mask Leak (lpm) 13 lpm   Skin Protection for O2 Device Yes   Breath Sounds   Right Upper Lobe Diminished   Right Middle Lobe Diminished   Right Lower Lobe Diminished   Left Upper Lobe Diminished   Left Lower Lobe Diminished   Alarm Settings   Alarms On Y   Press Low Alarm 6 cmH2O   High Pressure Alarm 29 cmH2O   Delay Alarm 20 sec(s)   Resp Rate Low Alarm 6   High Respiratory Rate 45 br/min

## 2019-08-04 NOTE — PROGRESS NOTES
Spoke with Dr. Cammy Walton on the phone regarding patient oozing at right femoral site, was told to place thrombix pad at insertion site, to redress the area around the site with 4x4, dressing changed and continuing to monitor, Lane Tejada RN

## 2019-08-04 NOTE — CONSULTS
URETHERAL DILATION      JOINT REPLACEMENT      bilateral knee    OTHER SURGICAL HISTORY  6/6/11    TURP    OTHER SURGICAL HISTORY  2012    cystoscopy with urethral dilation    OTHER SURGICAL HISTORY      Colostomy Reversal    OTHER SURGICAL HISTORY  11/20/2013    cystoscopy, TUR bladder tumor with Holmium laser    OTHER SURGICAL HISTORY  07/22/2015    cystoscopy, urethral dilatation    [place] COLOSTOMY  11/3/10    bowel resect w/colostomy    PROSTATE SURGERY      TURP  2012    TURP  2/20/13       Home Medications:   Prior to Admission medications    Medication Sig Start Date End Date Taking? Authorizing Provider   metFORMIN (GLUCOPHAGE-XR) 500 MG extended release tablet TAKE (1) TABLET DAILY WITH BREAKFAST. 7/29/19   MADY Weir CNP   HYDROcodone-acetaminophen (NORCO) 5-325 MG per tablet Take 1 tablet by mouth 3 times daily for 30 days.  7/9/19 8/8/19  MADY Weir CNP   furosemide (LASIX) 40 MG tablet TAKE 1 TABLET ONCE DAILY 7/9/19   MADY Weir CNP   glipiZIDE (GLUCOTROL) 5 MG tablet TAKE (1) TABLET DAILY 5/31/19   MADY Weir CNP   tiZANidine (ZANAFLEX) 4 MG tablet TAKE 1 TABLET NIGHTLY 5/15/19   MADY Weir CNP   atorvastatin (LIPITOR) 40 MG tablet TAKE (1) TABLET DAILY 5/15/19   MADY Weir CNP   potassium chloride (KLOR-CON M) 10 MEQ extended release tablet TAKE 1 TABLET EVERY DAY 5/14/19   MADY Weir CNP   buPROPion (WELLBUTRIN XL) 150 MG extended release tablet TAKE 1 TABLET EVERY MORNING 3/18/19   MADY Weir CNP   fenofibrate micronized (LOFIBRA) 200 MG capsule TAKE 1 CAPSULE BY MOUTH EVERY MORNING BEFORE BREAKFAST 2/27/19   MADY Weir CNP   lisinopril (PRINIVIL;ZESTRIL) 20 MG tablet TAKE (1) TABLET DAILY 2/7/19   MADY Weir - CNP   meloxicam (MOBIC) 7.5 MG tablet TAKE (1) TABLET DAILY 12/19/18   MADY Weir CNP   blood glucose test Days per week: Not on file     Minutes per session: Not on file    Stress: Not on file   Relationships    Social connections:     Talks on phone: Not on file     Gets together: Not on file     Attends Yazidism service: Not on file     Active member of club or organization: Not on file     Attends meetings of clubs or organizations: Not on file     Relationship status: Not on file    Intimate partner violence:     Fear of current or ex partner: Not on file     Emotionally abused: Not on file     Physically abused: Not on file     Forced sexual activity: Not on file   Other Topics Concern    Not on file   Social History Narrative    Not on file       Family History:    History reviewed. No pertinent family history. Review of Systems:  A 14 point review of systems was completed. Pertinent positives identified in the HPI, all other review of systems negative. Physical Examination:    BP (!) 168/90   Pulse 101   Temp 98.5 °F (36.9 °C) (Axillary)   Resp 15   Ht 5' 7\" (1.702 m)   Wt 287 lb 7.7 oz (130.4 kg)   SpO2 91%   BMI 45.03 kg/m²        Admission Weight: 300 lb (136.1 kg)       General appearance: NAD  Eyes: PERRLA  Neck: no JVD, no lymphadenopathy. Respiratory: effort is labored,   Cardiovascular: regular-tachy, no murmur. No carotid bruits. Pulses:    femoral   RIGHT 2   LEFT 2   GI: abdomen soft, nondistended, no organomegaly. Musculoskeletal: strength and tone normal.  Extremities: warm and pink. 3+ edema bilaterally  Skin: no dermatitis or ulceration. Neuro/psychiatric: grossly intact. MEDICAL DECISION MAKING/TESTING        CT: personally reviewed. Saddle PE with large volume thrombus in bilater pulmonary arteries.   Enlarged RV with compressed LV        Labs:   CBC:   Recent Labs     08/03/19  1338 08/04/19  0526   WBC 12.2* 11.7*   HGB 15.6 13.9   HCT 47.1 41.9   MCV 93.4 93.3    242     BMP:   Recent Labs     08/03/19  1338 08/04/19  0526    140   K 3.9 3.9   CL

## 2019-08-04 NOTE — PROGRESS NOTES
Vascular    Via R Femoral Vein, EKOS Catheters palced in R and L pulmonary arteries and lytic infusion started.    Note dictated

## 2019-08-05 NOTE — PROGRESS NOTES
08/05/19 0738   NIV Type   Equipment Type v60   Mode CPAP   Mask Type Full face mask   Mask Size Large   Settings/Measurements   Comfort Level Good   Using Accessory Muscles No   CPAP 13 cmH2O   IPAP 0 cmH20   EPAP 0 cmH2O   Rate Ordered 0   Resp 19   SpO2 96   FiO2  65 %   Vt Exhaled 615 mL   Mask Leak (lpm) 0 lpm   Skin Protection for O2 Device Yes   Alarm Settings   Alarms On Y   Press Low Alarm 6 cmH2O   High Pressure Alarm 25 cmH2O   Delay Alarm 20 sec(s)   Resp Rate Low Alarm 6   High Respiratory Rate 45 br/min   Oxygen Therapy/Pulse Ox   O2 Therapy Oxygen   $Oxygen $Daily Charge   O2 Device PAP (positive airway pressure)   SpO2 96 %   $Pulse Oximeter $Spot check (multiple/continuous)

## 2019-08-05 NOTE — PROGRESS NOTES
Vascular    S/P PA thrombolysis. Tachycardia improved. Remains with significant hypoxia  R groin without bleeding or hematoma. Patient needs Venous duplex lower extremities. Recc Lower extremity compression stockings. Ok to transition to oral anticoagulation  Please call if further input needed.

## 2019-08-05 NOTE — PROGRESS NOTES
Patient assessment complete and charted. VSS. AOx4. Bed locked and in lowest position. Non-skid socks in place. Call light within reach. Bed alarm on. Patient states no further needs at this time. Will continue to monitor.

## 2019-08-05 NOTE — PROGRESS NOTES
for Referral  Seb Reid was referred for a bedside swallow evaluation to assess the efficiency of his swallow function, identify signs and symptoms of aspiration and make recommendations regarding safe dietary consistencies, effective compensatory strategies, and safe eating environment. Impression  Dysphagia Diagnosis: Mild oral stage dysphagia;Mild pharyngeal stage dysphagia; Suspected needs further assessment  Dysphagia Outcome Severity Scale: Level 4: Mild moderate dysphagia- Intermittent supervision/cueing. One - two diet consistencies restricted   Pt seen upright in bed, alert and agreeable to evaluation, RN OK'd SLP entry and evaluation, pt's wife at bedside. Pt currently on 8 L HFNC. Pt's oral-motor exam grossly unremarkable, completed timely volitional swallow and strong volitional cough. Pt observed with ice chips and thin liquid trials via tsp, cup, and straw with reduced bolus control observed, suspect rapid spillover BOT. x1 immediate cough noted with TL via straw. Pt impulsive at times, taking large, consecutive sips despite cueing. Improved bolus control and grossly timely swallow initiation noted with nectar-thick liquids (tsp, cup, and straw) with no overt s/s of aspiration/penetration -- no coughing, throat clearing, wet vocal quality, or change in O2 (RR 28/min before and after trials). Pt also observed with puree and regular solid trials -- mildly reduced A-P bolus transit noted with regular solid trials, but effective, with no oral/lingual residue post-swallow. Recommend initiating Regular solid diet with nectar-thick liquids, meds in puree, strict aspiration precautions. *If pt has overt s/s of aspiration with PO intake or worsening respiratory status, recommend downgrade to NPO pending re-assessment by ST.  RN notified of recs. Treatment Plan  Requires SLP Intervention: Yes  Duration/Frequency of Treatment: 3-5x/week for LOS  D/C Recommendations:  To be determined functional limits    Oral Motor Deficits  Oral/Motor  Oral Motor: Within functional limits    Oral Phase Dysfunction  Oral Phase  Oral Phase: Exceptions  Oral Phase Dysfunction  Decreased Anterior to Posterior Transit: Reg solid  Suspected Premature Bolus Loss: All     Indicators of Pharyngeal Phase Dysfunction   Pharyngeal Phase  Pharyngeal Phase: Exceptions  Indicators of Pharyngeal Phase Dysfunction  Decreased Laryngeal Elevation: All  Cough - Immediate: Thin - straw(x1)  Pharyngeal Phase   Pharyngeal: Pt's O2 sats fluctuated from % during BSE. RR 20/min at start of BSE, increasing to 28/min during and after PO trials. Prognosis  Prognosis  Prognosis for safe diet advancement: fair  Barriers to reach goals: age;fatigue  Individuals consulted  Consulted and agree with results and recommendations: Patient; Family member;RN  Family member consulted: Pt's wife    Education  Patient Education: Pt educated on reason for referral, role of ST, assessment results and recommendations.   Patient Education Response: Verbalizes understanding;Needs reinforcement  Safety Devices in place: Yes  Type of devices: Left in bed;Call light within reach;Nurse notified       Therapy Time  SLP Individual Minutes  Time In: 0480  Time Out: 9571  Minutes: 24 minutes; dysphagia eval and tx    TRACEY CherryS. [id number] [facility]  Speech-language pathologist  [phone]

## 2019-08-05 NOTE — PROGRESS NOTES
Sheath removed without complications. No bleeding after 15 min of pressure to site.  Transparent dressing placed will continue to monitor

## 2019-08-05 NOTE — PROGRESS NOTES
08/04/19 2029   NIV Type   Equipment Type v60   Mode CPAP   Mask Type Full face mask   Mask Size Large   Settings/Measurements   Comfort Level Good   Using Accessory Muscles No   CPAP 13 cmH2O   Rate Ordered 12   Resp 22   SpO2 95   FiO2  65 %   Vt Exhaled 679 mL   Mask Leak (lpm) 4 lpm   Skin Protection for O2 Device Yes   Breath Sounds   Right Upper Lobe Diminished   Right Middle Lobe Diminished   Right Lower Lobe Diminished   Left Upper Lobe Diminished   Left Lower Lobe Diminished   Patient Observation   Observations D   Alarm Settings   Alarms On Y   Press Low Alarm 6 cmH2O   High Pressure Alarm 29 cmH2O   Delay Alarm 20 sec(s)   Resp Rate Low Alarm 6   High Respiratory Rate 45 br/min   Oxygen Therapy/Pulse Ox   SpO2 94 %

## 2019-08-06 NOTE — PLAN OF CARE
Problem: Pain:  Goal: Pain level will decrease  Description  Pain level will decrease  8/5/2019 2020 by Trinity Noland RN  Outcome: Ongoing  8/5/2019 1011 by Sidonie Severance, RN  Outcome: Ongoing  Goal: Control of acute pain  Description  Control of acute pain  Outcome: Ongoing  Goal: Control of chronic pain  Description  Control of chronic pain  Outcome: Ongoing     Problem: Falls - Risk of:  Goal: Will remain free from falls  Description  Will remain free from falls  8/5/2019 2020 by Trinity Noland RN  Outcome: Ongoing  8/5/2019 1011 by Sidonie Severance, RN  Outcome: Ongoing  Goal: Absence of physical injury  Description  Absence of physical injury  Outcome: Ongoing     Problem: Discharge Planning:  Goal: Discharged to appropriate level of care  Description  Discharged to appropriate level of care  Outcome: Ongoing  Goal: Participates in care planning  Description  Participates in care planning  Outcome: Ongoing     Problem: Airway Clearance - Ineffective:  Goal: Clear lung sounds  Description  Clear lung sounds  Outcome: Ongoing  Goal: Ability to maintain a clear airway will improve  Description  Ability to maintain a clear airway will improve  Outcome: Ongoing     Problem: Fluid Volume - Deficit:  Goal: Achieves intake and output within specified parameters  Description  Achieves intake and output within specified parameters  Outcome: Ongoing     Problem: Gas Exchange - Impaired:  Goal: Levels of oxygenation will improve  Description  Levels of oxygenation will improve  Outcome: Ongoing     Problem: Hyperthermia:  Goal: Ability to maintain a body temperature in the normal range will improve  Description  Ability to maintain a body temperature in the normal range will improve  Outcome: Ongoing

## 2019-08-06 NOTE — PROGRESS NOTES
jugular venous distention. Trachea midline. Respiratory: Improved respiratory effort.  Diminished breath sounds bilaterally   Cardiovascular: Tachycardic, regular rhythm with normal S1/S2 without murmurs, rubs or gallops. Abdomen: Soft, non-tender, non-distended with normal bowel sounds. Musculoskeletal:  No clubbing, cyanosis  bilaterally.  Full range of motion without deformity.  BLE edema 1+  Skin: Skin color, texture, turgor normal.  No rashes or lesions. Neurologic:  Neurovascularly intact without any focal sensory/motor deficits. Cranial nerves: II-XII intact, grossly non-focal.  Psychiatric:  Alert and oriented, thought content appropriate, normal insight  Capillary Refill: Brisk,< 3 seconds   Peripheral Pulses: +2 palpable, equal bilaterally       Labs:   Recent Labs     08/05/19  1152 08/05/19  2350 08/06/19  0428   WBC 9.6 8.3 8.1   HGB 13.0* 13.0* 12.3*   HCT 38.9* 39.2* 37.3*    217 219     Recent Labs     08/03/19  1338 08/04/19  0526    140   K 3.9 3.9   CL 95* 101   CO2 29 29   BUN 20 13   CREATININE 0.6* <0.5*   CALCIUM 9.5 8.7     Recent Labs     08/03/19  1338   AST 14*   ALT 21   BILITOT 0.4   ALKPHOS 46     No results for input(s): INR in the last 72 hours. Recent Labs     08/03/19  1338 08/03/19  1859 08/04/19  0018   TROPONINI 0.01 0.02* <0.01       Urinalysis:      Lab Results   Component Value Date    NITRU Negative 08/03/2019    WBCUA 3-5 08/03/2019    BACTERIA Rare 08/03/2019    RBCUA  08/03/2019    BLOODU LARGE 08/03/2019    SPECGRAV 1.020 08/03/2019    GLUCOSEU Negative 08/03/2019    GLUCOSEU NEGATIVE 11/17/2010       Radiology:  VL Extremity Venous Bilateral   Final Result      CT CHEST PULMONARY EMBOLISM W CONTRAST   Final Result   Extensive bilateral pulmonary embolism, including saddle embolism. Enlargement of the right ventricle, compatible with right heart strain. Moderate right pleural effusion. Partial atelectasis of the right lower   lobe.

## 2019-08-06 NOTE — PROGRESS NOTES
Regimen? No     Does the patient have everything they need prior to discharge? NA     Comments: PT ASSESSED, CHART AND HOME MEDICATIONS REVIEWED    Plan of Care: CHANGE FREQUENCY TO Q4HWA, PT IS WHEEZING AND INCREASED OXYGEN NEEDS    Electronically signed by Gerre Goldmann, RCP on 8/6/2019 at 9:36 AM    Respiratory Protocol Guidelines     1. Assessment and treatment by Respiratory Therapy will be initiated for medication and therapeutic interventions upon initiation of aerosolized medication. 2. Physician will be contacted for respiratory rate (RR) greater than 35 breaths per minute. Therapy will be held for heart rate (HR) greater than 140 beats per minute, pending direction from physician. 3. Bronchodilators will be administered via Metered Dose Inhaler (MDI) with spacer when the following criteria are met:  a. Alert and cooperative     b. HR < 140 bpm  c. RR < 30 bpm                d. Can demonstrate a 2-3 second inspiratory hold  4. Bronchodilators will be administered via Hand Held Nebulizer [facility]) to patients when ANY of the following criteria are met  a. Incognizant or uncooperative          b. Patients treated with HHN at Home        c. Unable to demonstrate proper use of MDI with spacer     d. RR > 30 bpm   5. Bronchodilators will be delivered via Metered Dose Inhaler (MDI), HHN, Aerogen to intubated patients on mechanical ventilation. 6. Inhalation medication orders will be delivered and/or substituted as outlined below. Aerosolized Medications Ordering and Administration Guidelines:    1. All Medications will be ordered by a physician, and their frequency and/or modality will be adjusted as defined by the patients Respiratory Severity Index (RSI) score. 2. If the patient does not have documented COPD, consider discontinuing anticholinergics when RSI is less than 9.  3. If the bronchospasm worsens (increased RSI), then the bronchodilator frequency can be increased to a maximum of every 4 hours.

## 2019-08-06 NOTE — PROGRESS NOTES
08/06/19 0331   NIV Type   $NIV $Daily Charge   NIV Started Yes   Equipment Type v60   Mode CPAP   Mask Type Full face mask   Mask Size Large   Bonnet size Large   Settings/Measurements   Comfort Level Good   Using Accessory Muscles No   CPAP 13 cmH2O   Resp 19   SpO2 98   FiO2  45 %   Vt Exhaled 533 mL   Mask Leak (lpm) 0 lpm   Skin Protection for O2 Device Yes   Breath Sounds   Right Upper Lobe Diminished   Right Middle Lobe Diminished   Right Lower Lobe Diminished   Left Upper Lobe Diminished   Left Lower Lobe Diminished   Patient Observation   Observations mepilex on nose   Alarm Settings   Alarms On Y   Press Low Alarm 6 cmH2O   High Pressure Alarm 25 cmH2O   Delay Alarm 30 sec(s)   Resp Rate Low Alarm 6   High Respiratory Rate 45 br/min   Oxygen Therapy/Pulse Ox   SpO2 98 %

## 2019-08-07 NOTE — PROGRESS NOTES
(10/25/10); pr colostomy (11/3/10); Cystoscopy; other surgical history (6/6/11); Colonoscopy (11/7/2011); colostomy (11/8/11); Prostate surgery; other surgical history (2012); other surgical history; TURP (2012); Cystoscopy (2/6/13); TURP (2/20/13); Cystoscopy (4/3/2013); other surgical history (11/20/2013); joint replacement; Cystocopy (3/19/14); Cystocopy (9/3/14); Cystoscopy (2/25/15); other surgical history (07/22/2015); Cystoscopy (N/A, 1/20/16); and Cystoscopy (09/21/2016). Restrictions  Restrictions/Precautions  Restrictions/Precautions: Up as Tolerated, General Precautions, Fall Risk  Position Activity Restriction  Other position/activity restrictions: 8L high flow (2.5L at home during the night)     Subjective   General  Chart Reviewed: Yes  Response To Previous Treatment: Patient with no complaints from previous session. Family / Caregiver Present: Yes(son)  Referring Practitioner: Katie Daniels  Subjective  Subjective: Patient resting supine in bed upon therapy arrival.  Patient agreeable to therapy eval.  General Comment  Comments: RN cleared pt for therapy eval.  Pain Screening  Patient Currently in Pain: Denies       Orientation  Orientation  Overall Orientation Status: Within Normal Limits     Cognition   Cognition  Overall Cognitive Status: Exceptions  Following Commands: Follows one step commands with repetition; Follows one step commands with increased time  Attention Span: Attends with cues to redirect  Safety Judgement: Decreased awareness of need for assistance;Decreased awareness of need for safety  Problem Solving: Assistance required to generate solutions;Assistance required to identify errors made  Insights: Decreased awareness of deficits  Initiation: Requires cues for some  Sequencing: Requires cues for some     Objective   Bed mobility  Supine to Sit: 2 Person assistance;Dependent/Total(min A x2 to R)  Sit to Supine: Dependent/Total;2 Person assistance(mod A x2)     Transfers  Sit to

## 2019-08-07 NOTE — PROGRESS NOTES
08/07/19 1608   NIV Type   Equipment Type V60   Mode BIPAP   Mask Type Full face mask   Mask Size Large   Bonnet size Large   Settings/Measurements   Comfort Level Good   Using Accessory Muscles No   IPAP 18 cmH20   EPAP 10 cmH2O   Rate Ordered 12   Resp 23   SpO2 93   FiO2  50 %   Vt Exhaled 408 mL   Skin Protection for O2 Device Yes   Breath Sounds   Right Upper Lobe Diminished   Right Middle Lobe Diminished   Right Lower Lobe Diminished   Left Upper Lobe Diminished   Left Lower Lobe Diminished   Alarm Settings   Alarms On Y   Press Low Alarm 6 cmH2O   High Pressure Alarm 25 cmH2O   Delay Alarm 20 sec(s)   Resp Rate Low Alarm 6   High Respiratory Rate 45 br/min

## 2019-08-07 NOTE — PROGRESS NOTES
Hospitalist Progress Note      PCP: Aurora Solis, APRN - CNP    Date of Admission: 8/3/2019         Chief Complaint: Shortness of breath  [address] yo WM p/w resp failure and suspected copd exaccerbation. pulm consulted. CTPA done and noted saddle embolus and transferred to icu. vasc surg consulted.     Subjective: denies sob/cp currently, off bipap currently, no overnight issues, feels very weak         Medications:  Reviewed    Infusion Medications    dextrose       Scheduled Medications    mupirocin   Nasal BID    cefUROXime  500 mg Oral 2 times per day    ipratropium-albuterol  1 ampule Inhalation Q4H WA    apixaban  10 mg Oral BID    Followed by   Renato Nuno ON 8/12/2019] apixaban  5 mg Oral BID    famotidine  20 mg Oral BID    atorvastatin  40 mg Oral Nightly    insulin lispro  0-6 Units Subcutaneous TID WC    insulin lispro  0-3 Units Subcutaneous Nightly    buPROPion  150 mg Oral Daily    HYDROcodone-acetaminophen  1 tablet Oral TID    lisinopril  20 mg Oral Daily    tiZANidine  4 mg Oral Nightly    sodium chloride flush  10 mL Intravenous 2 times per day     PRN Meds: HYDROcodone 5 mg - acetaminophen, morphine, glucose, dextrose, glucagon (rDNA), dextrose, sodium chloride flush, magnesium hydroxide, ondansetron, acetaminophen      Intake/Output Summary (Last 24 hours) at 8/7/2019 0916  Last data filed at 8/7/2019 0840  Gross per 24 hour   Intake 390 ml   Output 1850 ml   Net -1460 ml       Physical Exam Performed:    BP (!) 135/58   Pulse 91   Temp 98.2 °F (36.8 °C) (Axillary)   Resp 22   Ht 5' 7\" (1.702 m)   Wt 292 lb 5.3 oz (132.6 kg)   SpO2 96%   BMI 45.79 kg/m²     General appearance: Ill-appearing, morbidly obese ,less respiratory distress, appears stated age and cooperative. HEENT:  Normal cephalic, atraumatic without obvious deformity. Pupils equal, round, and reactive to light.  Extra ocular muscles intact. Conjunctivae/corneas clear.   Neck: Supple, consulted, apprec recs     Acute resp failure with hypoxia  2/2 pna  ABG reviewed  -supplemental O2 to keep sats >92%, currently on 15 L oxygen  -bipap trial for increased work of breathing, uses trilogy AVAPS at home  -bronchodilators  -pulm consulted, apprec mgmt  -CTPA done,  Noted signif PE/saddle embolus, vasc surg consulted  -transferred to icu 8/4, s/p EKOS     Saddle PE- noted 8/4  -hep ggt, switched to eliquis  -vas surg consulted, apprec mgmt, s/p catheter directed PA thrombolysis with cathflo given   -LE u/s noted acute DVT     Hx of tobacco use  -Significant smoking history about 60 pack years     COPD  Follows up with Dr. Wili Brooks with [facility] pulmonology  Resume bronchodilators     dCHF  - BNP high, leg edema  - echo done(ef 60%, mild lvh, no wm abn, mod pulm htn, mild dilated rv), cautious with fluids     Morbid Obesity -  With Body mass index is 04.69 kg/m². Complicating assessment and treatment. Placing patient at risk for multiple co-morbidities as well as early death and contributing to the patient's presentation.  Counseled on weight loss.      GENESIS- on trilogy avaps  - resume bipap at night     HTN- controlled  -on lisinopril     DM2-w/o insulin use  -Controlled, resume metformin and glipizide, add SSI with Accu-Cheks before meals and at bedtime     HLD--resume statin and fenofibrate       DVT Prophylaxis: eliquis  Diet: DIET CARB CONTROL; Mildly Thick (Nectar)  Code Status: Full Code    PT/OT Eval Status: rec snf    Dispo - icu care, hopefully by Friday    Tiara Renae MD

## 2019-08-07 NOTE — CARE COORDINATION
nHpredict outcome report received and reviewed with , TRINITY Acosta. Report forwarded to Manager of Case Management, KARLOS Roper, ALEX and Case Management , DICKSON Rubio Se. Report uploaded into patient's record and can be viewed under Media Tab.        Amanda BELLAMY, RN, [facility]  Care Transition Nurse   [phone]

## 2019-08-07 NOTE — PROGRESS NOTES
Occupational Therapy  Facility/Department: [facility] C2  Daily Treatment Note  NAME: Ayla Boyer  : 10/17/1933  MRN: [id number]    Date of Service: 2019    Discharge Recommendations:  IP Rehab       Assessment   Performance deficits / Impairments: Decreased functional mobility ; Decreased safe awareness;Decreased balance;Decreased cognition;Decreased ADL status; Decreased endurance;Decreased strength  Assessment: Pt demonstrating improved transfers and standing this session requiring min A of 2 with use of RW. Continue OT per POC. Prognosis: Good  OT Education: OT Role;Plan of Care;Energy Conservation;Transfer Training  REQUIRES OT FOLLOW UP: Yes  Activity Tolerance  Activity Tolerance: Patient Tolerated treatment well  Safety Devices  Safety Devices in place: Yes  Type of devices: Call light within reach;Gait belt;Nurse notified; Left in bed         Patient Diagnosis(es): The primary encounter diagnosis was Acute respiratory failure with hypoxia ([address] Diagnoses of Pneumonia of right lung due to infectious organism, unspecified part of lung and Severe sepsis [facility]) were also pertinent to this visit. has a past medical history of Arthritis, BiPAP (biphasic positive airway pressure) dependence, Bladder carcinoma ([address] CAD (coronary artery disease), COPD (chronic obstructive pulmonary disease) ([address] Diabetes mellitus ([address] Diverticulosis of colon (without mention of hemorrhage), DVT (deep vein thrombosis) in pregnancy ([address] Hyperlipemia, Hypertension, melanoma, Narcolepsy, GENESIS treated with BiPAP, Pulmonary embolism ([address] and Sleep apnea. has a past surgical history that includes Abdomen surgery; Colon surgery (10/25/10); pr colostomy (11/3/10); Cystoscopy; other surgical history (11); Colonoscopy (2011); colostomy (11); Prostate surgery; other surgical history (); other surgical history; TURP (); Cystoscopy (13); TURP (13);  Cystoscopy (4/3/2013); other surgical history

## 2019-08-08 NOTE — PROGRESS NOTES
Speech Language Pathology  Facility/Department: [facility]  Dysphagia Daily Treatment Note    NAME: Ulisses Barnes  : 10/17/1933  MRN: [id number]    Patient Diagnosis(es):   Patient Active Problem List    Diagnosis Date Noted    Pleural effusion 2019    Atelectasis 2019    Acute saddle pulmonary embolism with acute cor pulmonale ([address]     CAP (community acquired pneumonia) 2019    Acute respiratory failure with hypoxemia ([address] 2019    History of tobacco abuse 2019    PNA (pneumonia) 2019    DM (diabetes mellitus), secondary uncontrolled ([address] 2019    Elevated lactic acid level 2019    History of Bell's palsy 2017    Morbid obesity due to excess calories ([address] 2016    GENESIS on CPAP 2016    Type 2 diabetes mellitus without complication, without long-term current use of insulin ([address] 2015    Essential hypertension 2015    Panlobular emphysema ([address] 2015    Coronary artery disease involving native coronary artery of native heart without angina pectoris 2015    Smokeless tobacco use 2013    Benign prostatic hyperplasia with urinary obstruction 2013    History of diverticulitis of colon 2011    Narcolepsy 2011    Bladder carcinoma (HCC)     History of melanoma     Arthritis     Hypercholesteremia     Gastroesophageal reflux disease without esophagitis        Pain: Pt reported chronic back pain; repositioned, pain relieved per pt    Current Diet: Regular solid diet with nectar-thick liquids    Diet Tolerance:  Patient tolerating current diet level without signs/symptoms of penetration / aspiration per chart. P.O. Trials: Thin   x ~4 oz via cup and straw   Nectar       Honey       Puree       Solid   x Pt declined     Impressions:  Pt seen upright in bed, alert and agreeable to therapy, but fatigued. RN OK'd SLP entry and therapy. Pt currently on 10 L HF NC.   Pt appears care.    Discharge Recommendations: TBD    If pt discharges from hospital prior to Speech/Swallowing discharge, this note serves as tx and discharge summary.      Total Treatment Time:  1220-7639 (10 minutes); dysphagia tx    Signature:  Anjum Almeida M.S. Leonel  Speech-language pathologist  [id number]

## 2019-08-08 NOTE — PROGRESS NOTES
crackles. No wheezing. No rhonchi. Diminished air entry right hemithorax  CV: Regular rate. Regular rhythm. No mumur or rub. No edema. GI: Protuberant abdomen. Skin: Warm and dry. No nodule on exposed extremities. No rash on exposed extremities. Lymph: Deferred. M/S: No cyanosis. No joint deformity. No clubbing. Neuro: Awake. Follows commands. No obvious focal deficit, detailed exam not performed  Psych: Oriented to person, place, time. No anxiety or agitation. Medications:  Scheduled Meds:   furosemide  40 mg Oral Daily    mupirocin   Nasal BID    ipratropium-albuterol  1 ampule Inhalation Q4H WA    famotidine  20 mg Oral BID    atorvastatin  40 mg Oral Nightly    insulin lispro  0-6 Units Subcutaneous TID WC    insulin lispro  0-3 Units Subcutaneous Nightly    buPROPion  150 mg Oral Daily    HYDROcodone-acetaminophen  1 tablet Oral TID    lisinopril  20 mg Oral Daily    tiZANidine  4 mg Oral Nightly    sodium chloride flush  10 mL Intravenous 2 times per day       PRN Meds:  HYDROcodone 5 mg - acetaminophen, morphine, glucose, dextrose, glucagon (rDNA), dextrose, sodium chloride flush, magnesium hydroxide, ondansetron, acetaminophen    Results:  CBC:   Recent Labs     08/07/19  1806 08/08/19  0004 08/08/19  0530   WBC 7.9 7.5 7.1   HGB 13.0* 12.6* 13.0*   HCT 39.3* 37.9* 39.3*   MCV 94.4 93.4 94.3    265 263     BMP:   Recent Labs     08/07/19  0414      K 4.1   CL 99   CO2 36*   PHOS 3.2   BUN 12   CREATININE <0.5*       Cultures:  Cultures negative    Films:  CXR reviewed by me  CT chest reviewed by me      Assessment and plan:  Acute respiratory failure. On high flow nasal cannula oxygen. Oxygen requirement has not improved. Was given Lasix for his right pleural effusion, CXR with persistent effusion, confirmed by CT chest.  Acute pulmonary embolism, saddle PE. Status post catheter directed thrombolysis.  Clinically improved symptomatically, has remained hemodynamically stable. On Eliquis. Acute cor pulmonale/right ventricular strain. Echocardiogram with mild right-sided dysfunction, preserved LVEF  Right pleural effusion, right lung atelectasis. Likely due to PE, small to moderate by bedside ultrasound. Repeat CXR and CT chest persistent pleural effusion and atelectasis. Since RV function is improved, could hold anticoagulation for repeated thoracentesis. Will hold Eliquis today, discussed with Dr. Chico Kelley, who reviewed images and is agreeable to performing thoracentesis tomorrow. Fluid studies ordered. ?  Right lung pneumonia. Clinically doubtful, changed to oral Ceftin. COPD/emphysema. Bronchodilator. Leukocytosis. Mild, resolved  HTN, DM 2, hyperlipidemia, obesity. Per IM. Discussed with the patient, his family and nursing. Planned thoracentesis tomorrow.       Electronically signed by:  Mary Kay Reyes MD    8/8/2019    7:26 PM.

## 2019-08-08 NOTE — PROGRESS NOTES
08/07/19 7985   NIV Type   Equipment Type v60   Mode BIPAP   Mask Type Full face mask   Mask Size Large   Bonnet size Large   Settings/Measurements   Comfort Level Good   Using Accessory Muscles No   IPAP 18 cmH20   EPAP 10 cmH2O   Rate Ordered 12   Resp 25   SpO2 95   FiO2  50 %   Vt Exhaled 406 mL   Mask Leak (lpm) 0 lpm   Skin Protection for O2 Device Yes   Breath Sounds   Right Upper Lobe Rhonchi   Right Middle Lobe Rhonchi;Diminished   Right Lower Lobe Diminished   Left Upper Lobe Rhonchi   Left Lower Lobe Diminished   Patient Observation   Observations mepilex on nose   Alarm Settings   Alarms On Y   Press Low Alarm 6 cmH2O   High Pressure Alarm 30 cmH2O   Apnea (secs) 20 secs   Resp Rate Low Alarm 6   High Respiratory Rate 45 br/min

## 2019-08-08 NOTE — PROGRESS NOTES
08/08/19 0841   NIV Type   Equipment Type v60   Mode BIPAP   Mask Type Full face mask   Mask Size Large   Settings/Measurements   Comfort Level Good   Using Accessory Muscles No   IPAP 18 cmH20   EPAP 10 cmH2O   Rate Ordered 12   Resp 18   SpO2 98   FiO2  50 %   Vt Exhaled 621 mL   Mask Leak (lpm) 0 lpm   Skin Protection for O2 Device Yes

## 2019-08-08 NOTE — PLAN OF CARE
Problem: Pain:  Goal: Pain level will decrease  Description  Pain level will decrease  Outcome: Ongoing     Problem: Serum Glucose Level - Abnormal:  Goal: Ability to maintain appropriate glucose levels will improve  Description  Ability to maintain appropriate glucose levels will improve  Outcome: Ongoing     Problem: OXYGENATION/RESPIRATORY FUNCTION  Goal: Patient will maintain patent airway  Outcome: Ongoing  Goal: Patient will achieve/maintain normal respiratory rate/effort  Description  Respiratory rate and effort will be within normal limits for the patient  Outcome: Ongoing     Problem: ACTIVITY INTOLERANCE/IMPAIRED MOBILITY  Goal: Mobility/activity is maintained at optimum level for patient  Outcome: Ongoing       Patient's EF (Ejection Fraction) is less than 40%    Patient's weights and intake/output reviewed:    Patient's Last Weight: 294 lbs obtained by bed scale. Difference of 0 lbs less than last documented weight. Intake/Output Summary (Last 24 hours) at 8/8/2019 1107  Last data filed at 8/8/2019 0950  Gross per 24 hour   Intake 120 ml   Output 1075 ml   Net -955 ml               Pt resting in bed at this time on 10 L O2. Pt with complaints of shortness of breath. Pt with pitting lower extremity edema.      Patient and/or Family's stated Goal of Care this Admission: reduce shortness of breath, increase activity tolerance, better understand heart failure and disease management, be more comfortable and reduce lower extremity edema prior to discharge      Comorbidities Reviewed Yes  Patient has a past medical history of Arthritis, BiPAP (biphasic positive airway pressure) dependence, Bladder carcinoma ([facility] CAD (coronary artery disease), COPD (chronic obstructive pulmonary disease) ([facility] Diabetes mellitus ([facility] [address] Diverticulosis of colon (without mention of hemorrhage), DVT (deep vein thrombosis) in pregnancy ([facility] Hyperlipemia, Hypertension, melanoma, Narcolepsy, GENESIS treated with BiPAP, Pulmonary

## 2019-08-08 NOTE — PROGRESS NOTES
clear.  Neck: Supple, with full range of motion. No jugular venous distention. Trachea midline. Respiratory: Improved respiratory effort.  Diminished breath sounds bilaterally , dullness at right base  Cardiovascular: Tachycardic, regular rhythm with normal S1/S2 without murmurs, rubs or gallops. Abdomen: Soft, non-tender, non-distended with normal bowel sounds. Musculoskeletal:  No clubbing, cyanosis  bilaterally.  Full range of motion without deformity.  BLE edema 1+  Skin: Skin color, texture, turgor normal.  No rashes or lesions. Neurologic:  Neurovascularly intact without any focal sensory/motor deficits. Cranial nerves: II-XII intact, grossly non-focal.  Psychiatric:  Alert and oriented, thought content appropriate, normal insight  Capillary Refill: Brisk,< 3 seconds   Peripheral Pulses: +2 palpable, equal bilaterally           Labs:   Recent Labs     08/07/19  1806 08/08/19  0004 08/08/19  0530   WBC 7.9 7.5 7.1   HGB 13.0* 12.6* 13.0*   HCT 39.3* 37.9* 39.3*    265 263     Recent Labs     08/07/19  0414      K 4.1   CL 99   CO2 36*   BUN 12   CREATININE <0.5*   CALCIUM 9.2   PHOS 3.2     No results for input(s): AST, ALT, BILIDIR, BILITOT, ALKPHOS in the last 72 hours. No results for input(s): INR in the last 72 hours. No results for input(s): Coralyn Honour in the last 72 hours. Urinalysis:      Lab Results   Component Value Date    NITRU Negative 08/03/2019    WBCUA 3-5 08/03/2019    BACTERIA Rare 08/03/2019    RBCUA  08/03/2019    BLOODU LARGE 08/03/2019    SPECGRAV 1.020 08/03/2019    GLUCOSEU Negative 08/03/2019    GLUCOSEU NEGATIVE 11/17/2010       Radiology:  CT CHEST WO CONTRAST   Final Result   There is a large right pleural effusion causing compressive atelectasis of   the right lower lobe, and trace left pleural effusion. There is calcific   coronary atherosclerosis and there is possible pulmonary venous hypertension.    Airspace disease in the right upper lobe may thoracentesis tomorrow, ?weekend    Chani Wilkinson MD

## 2019-08-08 NOTE — PROGRESS NOTES
Physical Therapy  Facility/Department: [facility] C4 PCU  Daily Treatment Note  NAME: Jennifer Thompson  : 10/17/1933  MRN: [id number]    Date of Service: 2019    Discharge Recommendations:  Subacute/Skilled Nursing Facility   PT Equipment Recommendations  Equipment Needed: No(TBD at facility)    Assessment   Body structures, Functions, Activity limitations: Decreased functional mobility ; Decreased endurance;Decreased strength;Decreased safe awareness;Decreased balance  Assessment: Pt participated well with PT today although still demonstrates significantly decreased activity tolerance. Able to perform one rep of sit<>stand from EOB to std. walker with min-modA x 1, although unable to stand >15-20 seconds 2* limited endurance. Pt can be somewhat self-limiting due to fear of falling and anxiety re: decreased endurance. Pt voices doubts about ability to tolerate 3 hrs of therapy/day at current functional level. Therefore, recommend SNF at D/C. Treatment Diagnosis: Decreased strength, endurance, and functional mobility  Specific instructions for Next Treatment: Progress ther ex and mobility as tolerated  Prognosis: Good  Decision Making: Medium Complexity  PT Education: Goals; Functional Mobility Training;PT Role;Transfer Training;Plan of Care;General Safety; Energy Conservation  Patient Education: Pt verbalizes/demonstrates understanding. REQUIRES PT FOLLOW UP: Yes  Activity Tolerance: Patient limited by endurance; Patient limited by fatigue  Activity Tolerance: Pt limited in overall tolerance by c/o fatigue and increasing SOB with activity (particularly after performing seated marching & standing at walker). O2 sats ranged from 92-94% throughout session while on 10L high-flow O2, HR = 78-88 bpm while seated EOB. Patient Diagnosis(es): The primary encounter diagnosis was Acute respiratory failure with hypoxia ([address]  Diagnoses of Pneumonia of right lung due to infectious organism, unspecified part of lung and

## 2019-08-09 NOTE — PROGRESS NOTES
reported that the pt is now less SOB. Pt declined all PO trials during session d/t fatigue, however, denied difficulty with thin liquids. Session spent on education largely with pt's wife regarding aspiration precautions, role of ST.  Pt's wife verbalized understanding of all reviewed information / strategies. Continue current diet. ST to continue to follow. Recommendations: Continue current Regular solid diet with thin liquids / no straws, strict aspiration precautions    Dysphagia goals:  Short-term Goals  Timeframe for Short-term Goals: 7 days (8/12/19)  1) The patient will tolerate recommended diet without observed clinical signs of aspiration. 08/09: ongoing. 2) The patient/caregiver will demonstrate understanding of compensatory strategies for improved swallowing safety. 08/09: ongoing; pt would benefit from continued reinforcement. 3) The patient will tolerate thin liquids without signs and symptoms of aspiration 10/10 via cup. 08/09: ongoing; pt and pt's wife denied difficulty with TL trials this date. Long-term Goals  Timeframe for Long-term Goals: 10 days (8/15/19)  Goal 1: The pt will tolerate safest and least restrictive diet without s/s of aspiration. 08/09: ongoing, continue current diet. Patient/Family/Caregiver Education:  SLP re: role of ST, aspiration precautions. Pt's wife verbalized understanding. Compensatory Strategies:  Small bites/sips;Eat/Feed slowly;Upright as possible for all oral intake;Remain upright for 30-45 minutes after meals; No straws    Plan:    Continued Dysphagia treatment with goals per plan of care. Discharge Recommendations: TBD    If pt discharges from hospital prior to Speech/Swallowing discharge, this note serves as tx and discharge summary.      Total Treatment Time:  6626-1709 (9 minutes); dysphagia tx    Signature:  Donita Diego M.S. [address] [facility], [place], operated by [facility]  Speech-language pathologist  [phone]

## 2019-08-09 NOTE — PLAN OF CARE
Problem: Pain:  Goal: Pain level will decrease  Description  Pain level will decrease  Outcome: Ongoing     Problem: Falls - Risk of:  Goal: Absence of physical injury  Description  Absence of physical injury  Outcome: Ongoing     Problem: Gas Exchange - Impaired:  Goal: Levels of oxygenation will improve  Description  Levels of oxygenation will improve  Outcome: Ongoing     Problem: Serum Glucose Level - Abnormal:  Goal: Ability to maintain appropriate glucose levels will improve  Description  Ability to maintain appropriate glucose levels will improve  Outcome: Ongoing     Problem: OXYGENATION/RESPIRATORY FUNCTION  Goal: Patient will achieve/maintain normal respiratory rate/effort  Description  Respiratory rate and effort will be within normal limits for the patient  Outcome: Ongoing         Patient's EF (Ejection Fraction) is less than 40%    Patient's weights and intake/output reviewed:    Patient's Last Weight: 293 lbs obtained by bed scale. Difference of 0 lbs less than last documented weight. Intake/Output Summary (Last 24 hours) at 8/9/2019 1509  Last data filed at 8/9/2019 1406  Gross per 24 hour   Intake 420 ml   Output 1550 ml   Net -1130 ml           Pt resting in bed at this time on 8 L O2. Pt with complaints of shortness of breath. Pt with pitting lower extremity edema.      Patient and/or Family's stated Goal of Care this Admission: reduce shortness of breath, increase activity tolerance, better understand heart failure and disease management, be more comfortable and reduce lower extremity edema prior to discharge      Comorbidities Reviewed Yes  Patient has a past medical history of Arthritis, BiPAP (biphasic positive airway pressure) dependence, Bladder carcinoma ([facility] [address] CAD (coronary artery disease), COPD (chronic obstructive pulmonary disease) ([facility] [address] Diabetes mellitus ([facility] [address] Diverticulosis of colon (without mention of hemorrhage), DVT (deep vein thrombosis) in pregnancy ([facility] [address] Hyperlipemia,

## 2019-08-09 NOTE — PROGRESS NOTES
Deferred. M/S: No cyanosis. No joint deformity. No clubbing. Neuro: Awake. Follows commands. No obvious focal deficit, detailed exam not performed  Psych: Oriented to person, place, time. No anxiety or agitation. Medications:  Scheduled Meds:   furosemide  40 mg Oral Daily    mupirocin   Nasal BID    ipratropium-albuterol  1 ampule Inhalation Q4H WA    famotidine  20 mg Oral BID    atorvastatin  40 mg Oral Nightly    insulin lispro  0-6 Units Subcutaneous TID WC    insulin lispro  0-3 Units Subcutaneous Nightly    buPROPion  150 mg Oral Daily    HYDROcodone-acetaminophen  1 tablet Oral TID    lisinopril  20 mg Oral Daily    tiZANidine  4 mg Oral Nightly    sodium chloride flush  10 mL Intravenous 2 times per day       PRN Meds:  HYDROcodone 5 mg - acetaminophen, morphine, glucose, dextrose, glucagon (rDNA), dextrose, sodium chloride flush, magnesium hydroxide, ondansetron, acetaminophen    Results:  CBC:   Recent Labs     08/08/19  0004 08/08/19  0530 08/09/19  0507   WBC 7.5 7.1 6.5   HGB 12.6* 13.0* 12.4*   HCT 37.9* 39.3* 37.2*   MCV 93.4 94.3 93.4    263 274     BMP:   Recent Labs     08/07/19  0414      K 4.1   CL 99   CO2 36*   PHOS 3.2   BUN 12   CREATININE <0.5*       Cultures:  Cultures negative    Films:  CXR reviewed by me  CT chest reviewed by me      Assessment and plan:  Acute respiratory failure. On high flow nasal cannula oxygen. Oxygen requirement has improved normally. Informed nursing to try and wean him aggressively today since he underwent thoracentesis. Acute pulmonary embolism, saddle PE. Status post catheter directed thrombolysis. Clinically improved symptomatically, has remained hemodynamically stable. On Eliquis, resume today. Acute cor pulmonale/right ventricular strain. Echocardiogram with mild right-sided dysfunction, preserved LVEF  Right pleural effusion, right lung atelectasis.   Likely due to PE, status post thoracentesis with removal of 550 mL.  Reportedly specimen sent, await results. ?  Right lung pneumonia. Clinically doubtful, changed to oral Ceftin. COPD/emphysema. Bronchodilator. Leukocytosis. Mild, resolved  HTN, DM 2, hyperlipidemia, obesity. Per IM. Discussed with the patient, his family and nursing. Probably home on Monday or so. Discussed with Dr. Agus Zacarias.       Electronically signed by:  Del Lemos MD    8/9/2019    11:53 AM.

## 2019-08-09 NOTE — PROGRESS NOTES
RESPIRATORY THERAPY ASSESSMENT    Name:  Timo Nielsen  Medical Record Number:  [id number]  Age: 80 y.o. Gender: male  : 10/17/1933  Today's Date:  2019  Room:  6752/1429-24    Assessment     Is the patient being admitted for a COPD or Asthma exacerbation? No   (If yes the patient will be seen every 4 hours for the first 24 hours and then reassessed)    Patient Admission Diagnosis      Allergies  No Known Allergies    Minimum Predicted Vital Capacity:     990 mL          Actual Vital Capacity:      N/A (Patient unable to do)              Pulmonary History:COPD  Home Oxygen Therapy:  2-3 LPM via Nasal Cannula QHS and PRN   Home Respiratory Therapy:None   Current Respiratory Therapy:  [facility] HHN Q4H [place]  Treatment Type: HHN  Medications: Albuterol/Ipratropium    Respiratory Severity Index(RSI)   Patients with orders for inhalation medications, oxygen, or any therapeutic treatment modality will be placed on Respiratory Protocol. They will be assessed with the first treatment and at least every 72 hours thereafter. The following severity scale will be used to determine frequency of treatment intervention.     Smoking History: Pulmonary Disease or Smoking History, Greater than 15 pack year = 2    Social History  Social History     Tobacco Use    Smoking status: Former Smoker     Packs/day: 1.00     Years: 70.00     Pack years: 70.00     Last attempt to quit: 3/6/2010     Years since quittin.4    Smokeless tobacco: Current User     Types: Chew    Tobacco comment: chews   Substance Use Topics    Alcohol use: No    Drug use: No       Recent Surgical History: None = 0  Past Surgical History  Past Surgical History:   Procedure Laterality Date    ABDOMEN SURGERY      COLON SURGERY  10/25/10    perforated diverticulum    COLONOSCOPY  2011    COLOSTOMY  11    colostomy reversal    CYSTOSCOPY      CYSTOSCOPY  13    RESECTION OF PROSTATE TUMOR    CYSTOSCOPY  4/3/2013    small tumor being placed on Home Treatment Regimen? Yes     Does the patient have everything they need prior to discharge? NA     Comments: Patient assessed and chart reviewed. Discussed with patient pulmonary disease history, smoking history, surgical history, home oxygen usage, and home respiratory medications. Treatment plan discussed with patient, and patient is in agreement. Patient stated he felt like the treatments were helping him and that he would like to continue taking them Q4H [place]. Plan of Care: [address] [place]. Re-evaluate as needed. Electronically signed by Kirstie Jain RCP, RRT, RRT-ACCS on 8/9/2019 at 4:45 PM    Respiratory Protocol Guidelines     1. Assessment and treatment by Respiratory Therapy will be initiated for medication and therapeutic interventions upon initiation of aerosolized medication. 2. Physician will be contacted for respiratory rate (RR) greater than 35 breaths per minute. Therapy will be held for heart rate (HR) greater than 140 beats per minute, pending direction from physician. 3. Bronchodilators will be administered via Metered Dose Inhaler (MDI) with spacer when the following criteria are met:  a. Alert and cooperative     b. HR < 140 bpm  c. RR < 30 bpm                d. Can demonstrate a 2-3 second inspiratory hold  4. Bronchodilators will be administered via Hand Held Nebulizer [facility]3]) to patients when ANY of the following criteria are met  a. Incognizant or uncooperative          b. Patients treated with HHN at Home        c. Unable to demonstrate proper use of MDI with spacer     d. RR > 30 bpm   5. Bronchodilators will be delivered via Metered Dose Inhaler (MDI), HHN, Aerogen to intubated patients on mechanical ventilation. 6. Inhalation medication orders will be delivered and/or substituted as outlined below. Aerosolized Medications Ordering and Administration Guidelines:    1.  All Medications will be ordered by a physician, and their frequency and/or modality

## 2019-08-09 NOTE — PROGRESS NOTES
Nutrition Assessment (Low Risk)    Type and Reason for Visit: Initial(LOS)    Nutrition Assessment: Patient is nutritionally stable at this time AEB % PO intake at meals this admission and no hx of wt loss. Patient is out of room for procedure at time of visit but family present in room deny any nutritional concerns regarding changes in appetite or intake. Pt deemed low nutrition related risk at this time. Will continue to monitor per [facility].      Malnutrition Assessment:  Malnutrition Status:   No Malnutrition  Nutrition Risk Level   Risk Level: Low    Nutrition Diagnosis:   · Problem: No nutrition diagnosis at this time    Nutrition Intervention:  Food and/or Delivery: Continue current diet  Nutrition Education/Counseling/Coordination of Care:  Continued Inpatient Monitoring      Electronically signed by Gaye Benjamin RD, LD on 8/9/19 at 3:23 PM  Contact Number: Office: [phone]; [address]: [id number]

## 2019-08-09 NOTE — PROGRESS NOTES
Hospitalist Progress Note      PCP: Evelio Montesinos, APRN - CNP    Date of Admission: 8/3/2019       Chief Complaint: Shortness of breath  [address] [employer] yo WM p/w resp failure and suspected copd exaccerbation. pulm consulted. CTPA done and noted saddle embolus and transferred to icu. vasc surg consulted. Subjective: just returned from thoracentesis, family at bedside       Medications:  Reviewed    Infusion Medications    dextrose       Scheduled Medications    furosemide  40 mg Oral Daily    mupirocin   Nasal BID    ipratropium-albuterol  1 ampule Inhalation Q4H WA    famotidine  20 mg Oral BID    atorvastatin  40 mg Oral Nightly    insulin lispro  0-6 Units Subcutaneous TID WC    insulin lispro  0-3 Units Subcutaneous Nightly    buPROPion  150 mg Oral Daily    HYDROcodone-acetaminophen  1 tablet Oral TID    lisinopril  20 mg Oral Daily    tiZANidine  4 mg Oral Nightly    sodium chloride flush  10 mL Intravenous 2 times per day     PRN Meds: HYDROcodone 5 mg - acetaminophen, morphine, glucose, dextrose, glucagon (rDNA), dextrose, sodium chloride flush, magnesium hydroxide, ondansetron, acetaminophen      Intake/Output Summary (Last 24 hours) at 8/9/2019 1225  Last data filed at 8/9/2019 0910  Gross per 24 hour   Intake 840 ml   Output 1450 ml   Net -610 ml       Physical Exam Performed:    BP (!) 157/82   Pulse 81   Temp 98.2 °F (36.8 °C) (Oral)   Resp 20   Ht 5' 7\" (1.702 m)   Wt 293 lb 10.4 oz (133.2 kg)   SpO2 97%   BMI 45.99 kg/m²   General appearance: Ill-appearing, morbidly obese ,less respiratory distress, appears stated age and cooperative. HEENT:  Normal cephalic, atraumatic without obvious deformity. Pupils equal, round, and reactive to light.  Extra ocular muscles intact. Conjunctivae/corneas clear. Neck: Supple, with full range of motion. No jugular venous distention. Trachea midline.   Respiratory: Improved respiratory effort.  Diminished breath sounds Hypercholesteremia [E78.00]     Sepsis- due to rt lung CAP  Presented with non productive minimal cough/ shortness of breath/tachycardia/ leukocytosis/tachypnea/severe hypoxia, lactic 2.9  Chest imaging reveals infiltrates on the right perihilar region  - Admitted to IP  - IV rocephin and doxycycline -started on 8/3/2019, switched to Ceftin  - sputum culture/gram, blood c/s, strep pneumo and legionella urine ag  - Acapella/I-S  - supplemental O2 to keep sats >92%, currently on 15 L oxygen  - MRSA swab, influenza a/b swab  - bipap  - lactic 2.9, 2 L bolus given in er with lactic now 2.2  -pulm consulted, apprec recs     Acute resp failure with hypoxia  2/2 pna  ABG reviewed  -supplemental O2 to keep sats >92%, currently on 15 L oxygen  -bipap trial for increased work of breathing, uses trilogy AVAPS at home  -bronchodilators  -pulm consulted, apprec mgmt  -CTPA done,  Noted signif PE/saddle embolus, vasc surg consulted  -transferred to icu 8/4, s/p EKOS   -plan for thoracentesis 8/9, 525 ml removed     Saddle PE- noted 8/4  -hep ggt, switched to eliquis  -vas surg consulted, apprec mgmt, s/p catheter directed PA thrombolysis with cathflo given   -LE u/s noted acute DVT     Hx of tobacco use  -Significant smoking history about 60 pack years     COPD  Follows up with Dr. Deidre Redding with [facility] pulmonology  Resume bronchodilators     dCHF  - BNP high, leg edema  - echo done(ef 60%, mild lvh, no wm abn, mod pulm htn, mild dilated rv), cautious with fluids     Morbid Obesity -  With Body mass index is 02.22 kg/m². Complicating assessment and treatment. Placing patient at risk for multiple co-morbidities as well as early death and contributing to the patient's presentation.  Counseled on weight loss.      GENESIS- on trilogy avaps  - resume bipap at night     HTN- controlled  -on lisinopril     DM2-w/o insulin use  -Controlled, resume metformin and glipizide, add SSI with Accu-Cheks before meals and at bedtime     HLD--resume statin and fenofibrate       DVT Prophylaxis: resume eliquis  Diet: DIET CARB CONTROL; No Drinking Straw  Code Status: Full Code    PT/OT Eval Status: rec SNF vs IP rehab    Dispo - hopefully by Monday, wean down oxygen as able    Tiara Renae MD

## 2019-08-09 NOTE — PROGRESS NOTES
08/08/19 2139   NIV Type   Equipment Type v60   Mode BIPAP   Mask Type Full face mask   Mask Size Large   Settings/Measurements   Comfort Level Good   Using Accessory Muscles No   IPAP 18 cmH20   EPAP 10 cmH2O   Rate Ordered 12   Resp 24   SpO2 98   FiO2  50 %   Vt Exhaled 463 mL   Mask Leak (lpm) 0 lpm   Skin Protection for O2 Device Yes   Breath Sounds   Right Upper Lobe Clear   Right Middle Lobe Diminished   Right Lower Lobe Diminished   Left Upper Lobe Clear   Left Lower Lobe Diminished   Alarm Settings   Alarms On Y   Press Low Alarm 6 cmH2O   High Pressure Alarm 30 cmH2O   Apnea (secs) 20 secs   Resp Rate Low Alarm 6   High Respiratory Rate 45 br/min   Oxygen Therapy/Pulse Ox   O2 Therapy Oxygen   O2 Device PAP (positive airway pressure)

## 2019-08-10 NOTE — PROGRESS NOTES
dullness at right base  Cardiovascular: Tachycardic, regular rhythm with normal S1/S2 without murmurs, rubs or gallops. Abdomen: Soft, non-tender, non-distended with normal bowel sounds. Musculoskeletal:  No clubbing, cyanosis  bilaterally.  Full range of motion without deformity.  BLE edema 1+  Skin: Skin color, texture, turgor normal.  No rashes or lesions. Neurologic:  Neurovascularly intact without any focal sensory/motor deficits. Cranial nerves: II-XII intact, grossly non-focal.  Psychiatric:  Alert and oriented, thought content appropriate, normal insight  Capillary Refill: Brisk,< 3 seconds   Peripheral Pulses: +2 palpable, equal bilaterally           Labs:   Recent Labs     08/08/19  0530 08/09/19  0507 08/10/19  0537   WBC 7.1 6.5 6.5   HGB 13.0* 12.4* 12.8*   HCT 39.3* 37.2* 37.9*    274 298     No results for input(s): NA, K, CL, CO2, BUN, CREATININE, CALCIUM, PHOS in the last 72 hours. Invalid input(s): MAGNES  No results for input(s): AST, ALT, BILIDIR, BILITOT, ALKPHOS in the last 72 hours. Recent Labs     08/08/19  1816   INR 1.71*     No results for input(s): Renato Prier in the last 72 hours. Urinalysis:      Lab Results   Component Value Date    NITRU Negative 08/03/2019    WBCUA 3-5 08/03/2019    BACTERIA Rare 08/03/2019    RBCUA  08/03/2019    BLOODU LARGE 08/03/2019    SPECGRAV 1.020 08/03/2019    GLUCOSEU Negative 08/03/2019    GLUCOSEU NEGATIVE 11/17/2010       Radiology:  XR CHEST 1 VW   Final Result   Persistent right basilar parenchymal disease. Right hilar enlargement is   noted. XR CHEST PORTABLE   Final Result   No pneumothorax, following right-sided thoracentesis. Right basilar atelectasis versus pneumonia. Vascular congestion. US THORACENTESIS   Final Result   Successful ultrasound guided right-sided thoracentesis. A sample was sent for analysis at the request of the ordering clinician.          CT CHEST WO CONTRAST   Final tissue sampling. In a high-risk patient, consider CT at 3 months, PET/CT, or tissue sampling. Multiple Solid Nodules:      Nodule size less than 6 mm   In a low-risk patient, no routine follow-up. In a high-risk patient, optional CT at 12 months. Nodule size equals 6-8 mm   In a low-risk patient, CT at 3-6 months, then consider CT at 18-24 months. In a high-risk patient, CT at 3-6 months, then CT at 18-24 months. Nodule size greater than 8 mm   In a low-risk patient, CT at 3-6 months, then consider CT at 18-24 months. In a high-risk patient, CT at 3-6 months, then CT at 18-24 months. - Low risk patients include individuals with minimal or absent history of   smoking and other known risk factors. - High risk patients include individuals with a history or smoking or known   risk factors. Radiology 2017 http://pubs. rsna.org/doi/full/10.1148/radiol. 7117105191         XR CHEST PORTABLE   Final Result   Persistent right perihilar airspace disease, which can reflect pneumonia or   asymmetric edema. Radiopaque device of uncertain etiology projects over the left apex. Correlate with physical exam.         XR CHEST STANDARD (2 VW)   Final Result   Right perihilar infiltrate, likely a pneumonia. No overt failure or   significant pleural fluid.                  Assessment/Plan:    Active Hospital Problems    Diagnosis    Pleural effusion [J90]    Atelectasis [J98.11]    Acute saddle pulmonary embolism with acute cor pulmonale (HCC) [I26.02]    CAP (community acquired pneumonia) [J18.9]    Acute respiratory failure with hypoxemia (HCC) [J96.01]    History of tobacco abuse [Z87.891]    PNA (pneumonia) [J18.9]    DM (diabetes mellitus), secondary uncontrolled ([address] [E13.65]    Elevated lactic acid level [R79.89]    Morbid obesity due to excess calories ([address] [E66.01]    GENESIS on CPAP [G47.33, Z99.89]    Essential hypertension [I10]    Panlobular emphysema ([address] [J43.1]   

## 2019-08-10 NOTE — PLAN OF CARE
appropriate glucose levels will improve  8/10/2019 0318 by Yumi Hanson RN  Outcome: Ongoing  Note:   Diabetes education provided today:    Diabetic Neuropathy: signs and therapy. Foot care: advised to wash feet daily, pat dry and apply lotion at night, avoiding between toes. Need to look at feet daily and report to a physician any signs of inflammation or skin damage. Discussed diabetes shoes and socks. Nutrition as a mainstream of diabetes therapy. [place] about label reading. Carbs: good carbs and bad carbs, importance of carb counting, incorporation of protein with each meal to reduce Glycemic index, importance of portions, Carb/insulin ratio. Physical activity: advised to exercise 5-7 days a week 30-60 mins at least. Discussed how it affects BS readings. Different diabetic medications. Managing high and low sugar readings. Rotation of sites for subcutaneous medication injection. Problem: Sensory Perception - Impaired:  Goal: Ability to maintain a stable neurologic state will improve  Description  Ability to maintain a stable neurologic state will improve  Outcome: Ongoing     Problem: OXYGENATION/RESPIRATORY FUNCTION  Goal: Patient will maintain patent airway  Outcome: Ongoing     Problem: OXYGENATION/RESPIRATORY FUNCTION  Goal: Patient will achieve/maintain normal respiratory rate/effort  Description  Respiratory rate and effort will be within normal limits for the patient  8/10/2019 0318 by Yumi Hanson RN  Outcome: Ongoing     Problem: HEMODYNAMIC STATUS  Goal: Patient has stable vital signs and fluid balance  Outcome: Ongoing     Problem: FLUID AND ELECTROLYTE IMBALANCE  Goal: Fluid and electrolyte balance are achieved/maintained  Outcome: Ongoing  Note:     Patient's EF (Ejection Fraction) is greater than 40%    Patient's weights and intake/output reviewed:    Patient's Last Weight: 133.2 kg obtained by bed scale.        Intake/Output Summary (Last 24 hours) at 8/10/2019 9844  Last data filed

## 2019-08-10 NOTE — PROGRESS NOTES
08/09/19 2024   NIV Type   Equipment Type V60   Mode BIPAP   Mask Type Full face mask   Mask Size Large   Bonnet size Large   Settings/Measurements   Comfort Level Good   Using Accessory Muscles No   IPAP 18 cmH20   EPAP 10 cmH2O   Rate Ordered 10   Resp 22   SpO2 98   FiO2  50 %   Vt Exhaled 412 mL   Mask Leak (lpm) 0 lpm   Skin Protection for O2 Device Yes   Breath Sounds   Right Upper Lobe Diminished   Right Middle Lobe Diminished   Right Lower Lobe Diminished   Left Upper Lobe Diminished   Left Lower Lobe Diminished   Alarm Settings   Alarms On Y APER

## 2019-08-10 NOTE — PROGRESS NOTES
on exposed extremities. No rash on exposed extremities. Lymph: Deferred. M/S: No cyanosis. No joint deformity. No clubbing. Neuro: Awake. Follows commands. No obvious focal deficit, detailed exam not performed  Psych: Oriented to person, place, time. No anxiety or agitation. Medications:  Scheduled Meds:   furosemide  40 mg Oral Daily    ipratropium-albuterol  1 ampule Inhalation Q4H WA    famotidine  20 mg Oral BID    atorvastatin  40 mg Oral Nightly    insulin lispro  0-6 Units Subcutaneous TID WC    insulin lispro  0-3 Units Subcutaneous Nightly    buPROPion  150 mg Oral Daily    HYDROcodone-acetaminophen  1 tablet Oral TID    lisinopril  20 mg Oral Daily    tiZANidine  4 mg Oral Nightly    sodium chloride flush  10 mL Intravenous 2 times per day       PRN Meds:  HYDROcodone 5 mg - acetaminophen, morphine, glucose, dextrose, glucagon (rDNA), dextrose, sodium chloride flush, magnesium hydroxide, ondansetron, acetaminophen    Results:  CBC:   Recent Labs     08/08/19  0530 08/09/19  0507 08/10/19  0537   WBC 7.1 6.5 6.5   HGB 13.0* 12.4* 12.8*   HCT 39.3* 37.2* 37.9*   MCV 94.3 93.4 93.2    274 298       Cultures:  Cultures negative    Films:  CXR reviewed by me  CT chest reviewed by me      Assessment and plan:  Acute respiratory failure. On high flow nasal cannula oxygen. Oxygen requirement has improved, not as much as expected post thoracentesis. Continue to wean to keep SaO2 >90%   Acute pulmonary embolism, saddle PE. Status post catheter directed thrombolysis. Clinically improved symptomatically, has remained hemodynamically stable. On Eliquis, resume today. Acute cor pulmonale/right ventricular strain. Echocardiogram with mild right-sided dysfunction, preserved LVEF. Increase Lasix. Right pleural effusion, right lung atelectasis. Likely due to PE, status post thoracentesis with removal of 550 mL. Fluid appears transudative, cultures pending.   Repeat CXR PA/lateral tomorrow. ?  Right lung pneumonia. Clinically doubtful, changed to oral Ceftin. COPD/emphysema. Bronchodilator. Leukocytosis. Mild, resolved  HTN, DM 2, hyperlipidemia, obesity. Per IM. Discussed with the patient, his family and nursing.         Electronically signed by:  Janessa Bhandari MD    8/10/2019    10:50 AM.

## 2019-08-11 NOTE — PLAN OF CARE
understand heart failure and disease management, be more comfortable and reduce lower extremity edema prior to discharge      Comorbidities Reviewed Yes  Patient has a past medical history of Arthritis, BiPAP (biphasic positive airway pressure) dependence, Bladder carcinoma ([address] CAD (coronary artery disease), COPD (chronic obstructive pulmonary disease) ([address] Diabetes mellitus ([address] Diverticulosis of colon (without mention of hemorrhage), DVT (deep vein thrombosis) in pregnancy ([facility] [address] Hyperlipemia, Hypertension, melanoma, Narcolepsy, GENESIS treated with BiPAP, Pulmonary embolism ([facility] [address] and Sleep apnea. >>For CHF and Comorbidity documentation on Education Time and Topics, please see Education Tab           Problem: ACTIVITY INTOLERANCE/IMPAIRED MOBILITY  Goal: Mobility/activity is maintained at optimum level for patient  Outcome: Ongoing     Problem: Risk for Impaired Skin Integrity  Goal: Tissue integrity - skin and mucous membranes  Description  Structural intactness and normal physiological function of skin and  mucous membranes.   Outcome: Ongoing

## 2019-08-11 NOTE — PROGRESS NOTES
08/10/19 2121   NIV Type   NIV Started Yes   Equipment Type v60   Mode BIPAP   Mask Type Full face mask   Mask Size Large   Settings/Measurements   Comfort Level Good   Using Accessory Muscles No   IPAP 18 cmH20   EPAP 10 cmH2O   Rate Ordered 10   Resp 25   SpO2 97   FiO2  50 %   Vt Exhaled 488 mL   Mask Leak (lpm) 13 lpm   Skin Protection for O2 Device Yes   Breath Sounds   Right Upper Lobe Diminished   Right Middle Lobe Diminished   Right Lower Lobe Diminished   Left Upper Lobe Diminished   Left Lower Lobe Diminished   Alarm Settings   Alarms On Y   Press Low Alarm 6 cmH2O   High Pressure Alarm 45 cmH2O   Delay Alarm 20 sec(s)   Resp Rate Low Alarm 6   High Respiratory Rate 45 br/min

## 2019-08-11 NOTE — PROGRESS NOTES
Nodule size greater than 8 mm         In a low-risk patient, consider CT at 3 months, PET/CT, or tissue sampling. In a high-risk patient, consider CT at 3 months, PET/CT, or tissue sampling. Multiple Solid Nodules:      Nodule size less than 6 mm   In a low-risk patient, no routine follow-up. In a high-risk patient, optional CT at 12 months. Nodule size equals 6-8 mm   In a low-risk patient, CT at 3-6 months, then consider CT at 18-24 months. In a high-risk patient, CT at 3-6 months, then CT at 18-24 months. Nodule size greater than 8 mm   In a low-risk patient, CT at 3-6 months, then consider CT at 18-24 months. In a high-risk patient, CT at 3-6 months, then CT at 18-24 months. - Low risk patients include individuals with minimal or absent history of   smoking and other known risk factors. - High risk patients include individuals with a history or smoking or known   risk factors. Radiology 2017 http://pubs. rsna.org/doi/full/10.1148/radiol. 9633135006         XR CHEST PORTABLE   Final Result   Persistent right perihilar airspace disease, which can reflect pneumonia or   asymmetric edema. Radiopaque device of uncertain etiology projects over the left apex. Correlate with physical exam.         XR CHEST STANDARD (2 VW)   Final Result   Right perihilar infiltrate, likely a pneumonia. No overt failure or   significant pleural fluid.          XR CHEST STANDARD (2 VW)    (Results Pending)           Assessment/Plan:    Active Hospital Problems    Diagnosis    Pleural effusion [J90]    Atelectasis [J98.11]    Acute saddle pulmonary embolism with acute cor pulmonale (HCC) [I26.02]    CAP (community acquired pneumonia) [J18.9]    Acute respiratory failure with hypoxemia (HCC) [J96.01]    History of tobacco abuse [Z87.891]    PNA (pneumonia) [J18.9]    DM (diabetes mellitus), secondary uncontrolled ([facility] [address] [E13.65]    Elevated lactic acid level [R79.89]    Morbid obesity

## 2019-08-11 NOTE — PLAN OF CARE
Problem: Pain:  Goal: Pain level will decrease  Description  Pain level will decrease  8/11/2019 1353 by Ayla Grider RN  Outcome: Ongoing  8/11/2019 0519 by Freddie Ruano RN  Outcome: Ongoing  Goal: Control of acute pain  Description  Control of acute pain  8/11/2019 1353 by Ayla Grider RN  Outcome: Ongoing  8/11/2019 0519 by Freddie Ruano RN  Outcome: Ongoing  Goal: Control of chronic pain  Description  Control of chronic pain  8/11/2019 1353 by Ayla Grider RN  Outcome: Ongoing  8/11/2019 0519 by Freddie Ruano RN  Outcome: Ongoing     Problem: Falls - Risk of:  Goal: Will remain free from falls  Description  Will remain free from falls  8/11/2019 1353 by Ayla Grider RN  Outcome: Ongoing  8/11/2019 0519 by Freddie Ruano RN  Outcome: Ongoing  Goal: Absence of physical injury  Description  Absence of physical injury  8/11/2019 1353 by Ayla Grider RN  Outcome: Ongoing  8/11/2019 0519 by Freddie Ruano RN  Outcome: Ongoing     Problem: Discharge Planning:  Goal: Discharged to appropriate level of care  Description  Discharged to appropriate level of care  8/11/2019 1353 by Ayla Grider RN  Outcome: Ongoing  8/11/2019 0519 by Freddie Ruano RN  Outcome: Ongoing  Goal: Participates in care planning  Description  Participates in care planning  8/11/2019 1353 by Ayla Grider RN  Outcome: Ongoing  8/11/2019 0519 by Freddie Ruano RN  Outcome: Ongoing     Problem: Airway Clearance - Ineffective:  Goal: Clear lung sounds  Description  Clear lung sounds  8/11/2019 1353 by Ayla Grider RN  Outcome: Ongoing  8/11/2019 0519 by Fredide Ruano RN  Outcome: Ongoing  Goal: Ability to maintain a clear airway will improve  Description  Ability to maintain a clear airway will improve  8/11/2019 1353 by Ayla Grider RN  Outcome: Ongoing  8/11/2019 0519 by Freddie Ruano RN  Outcome: Ongoing     Problem: Fluid Volume - Deficit:  Goal: Achieves intake and output within specified parameters  Description  Achieves intake

## 2019-08-11 NOTE — PROGRESS NOTES
08/11/19 0814   NIV Type   Equipment Type V60   Mode BIPAP   Mask Type Full face mask   Mask Size Large   Settings/Measurements   Comfort Level Good   Using Accessory Muscles No   IPAP 18 cmH20   EPAP 10 cmH2O   Rate Ordered 10   Resp 30   SpO2 98   FiO2  50 %   Vt Exhaled 578 mL   Mask Leak (lpm) 0 lpm   Skin Protection for O2 Device Yes   Oxygen Therapy/Pulse Ox   O2 Therapy Oxygen humidified   $Oxygen $Daily Charge   O2 Device PAP (positive airway pressure)   SpO2 98 %   $Pulse Oximeter $Spot check (multiple/continuous)

## 2019-08-12 NOTE — PLAN OF CARE
Problem: Pain:  Goal: Pain level will decrease  Description  Pain level will decrease  8/12/2019 0334 by Yuriy Ocampo RN  Outcome: Ongoing     Problem: Pain:  Goal: Control of acute pain  Description  Control of acute pain  8/12/2019 0334 by Yuriy Ocampo RN  Outcome: Ongoing     Problem: Pain:  Goal: Control of chronic pain  Description  Control of chronic pain  8/12/2019 0334 by Yuriy Ocampo RN  Outcome: Ongoing     Problem: Falls - Risk of:  Goal: Will remain free from falls  Description  Will remain free from falls  8/12/2019 0334 by Yuriy Ocampo RN  Outcome: Ongoing     Problem: Falls - Risk of:  Goal: Absence of physical injury  Description  Absence of physical injury  8/12/2019 0334 by Yuriy Ocampo RN  Outcome: Ongoing     Problem: Discharge Planning:  Goal: Discharged to appropriate level of care  Description  Discharged to appropriate level of care  8/12/2019 0334 by Yuriy Ocampo RN  Outcome: Ongoing     Problem: Discharge Planning:  Goal: Participates in care planning  Description  Participates in care planning  8/12/2019 0334 by Yuriy Ocampo RN  Outcome: Ongoing     Problem: Airway Clearance - Ineffective:  Goal: Clear lung sounds  Description  Clear lung sounds  8/12/2019 0334 by Yuriy Ocampo RN  Outcome: Ongoing     Problem: Airway Clearance - Ineffective:  Goal: Ability to maintain a clear airway will improve  Description  Ability to maintain a clear airway will improve  8/12/2019 0334 by Yuriy Ocampo RN  Outcome: Ongoing     Problem: Fluid Volume - Deficit:  Goal: Achieves intake and output within specified parameters  Description  Achieves intake and output within specified parameters  8/12/2019 0334 by Yuriy Ocampo RN  Outcome: Ongoing     Problem: Gas Exchange - Impaired:  Goal: Levels of oxygenation will improve  Description  Levels of oxygenation will improve  8/12/2019 0334 by Yuriy Ocampo RN  Outcome: Ongoing     Problem: Hyperthermia:  Goal: Ability to maintain a body temperature in the Patient and/or Family's stated Goal of Care this Admission: reduce shortness of breath, increase activity tolerance, better understand heart failure and disease management, be more comfortable and reduce lower extremity edema prior to discharge      Comorbidities Reviewed Yes  Patient has a past medical history of Arthritis, BiPAP (biphasic positive airway pressure) dependence, Bladder carcinoma ([address] CAD (coronary artery disease), COPD (chronic obstructive pulmonary disease) ([facility] [address] Diabetes mellitus ([address] Diverticulosis of colon (without mention of hemorrhage), DVT (deep vein thrombosis) in pregnancy ([facility] [address] Hyperlipemia, Hypertension, melanoma, Narcolepsy, GENESIS treated with BiPAP, Pulmonary embolism ([facility] [address] and Sleep apnea. >>For CHF and Comorbidity documentation on Education Time and Topics, please see Education Tab           Problem: ACTIVITY INTOLERANCE/IMPAIRED MOBILITY  Goal: Mobility/activity is maintained at optimum level for patient  8/12/2019 0334 by Jade Tam RN  Outcome: Ongoing     Problem: Risk for Impaired Skin Integrity  Goal: Tissue integrity - skin and mucous membranes  Description  Structural intactness and normal physiological function of skin and  mucous membranes.   8/12/2019 0334 by Jade Tam RN  Outcome: Ongoing

## 2019-08-12 NOTE — PLAN OF CARE
Problem: Pain:  Goal: Pain level will decrease  Description  Pain level will decrease  8/12/2019 1113 by Bandar Carbajal RN  Outcome: Ongoing  8/12/2019 0334 by Merrill Cervantes RN  Outcome: Ongoing  Goal: Control of acute pain  Description  Control of acute pain  8/12/2019 1113 by Bandar Carbajal RN  Outcome: Ongoing  8/12/2019 0334 by Merrill Cervantes RN  Outcome: Ongoing  Goal: Control of chronic pain  Description  Control of chronic pain  8/12/2019 1113 by Bandar Carbajal RN  Outcome: Ongoing  8/12/2019 0334 by Merrill Cervantes RN  Outcome: Ongoing     Problem: Falls - Risk of:  Goal: Will remain free from falls  Description  Will remain free from falls  8/12/2019 1113 by Bandar Carbajal RN  Outcome: Ongoing  8/12/2019 0334 by Merrill Cervantes RN  Outcome: Ongoing  Goal: Absence of physical injury  Description  Absence of physical injury  8/12/2019 1113 by Bandar Carbajal RN  Outcome: Ongoing  8/12/2019 0334 by Merrill Cervantes RN  Outcome: Ongoing     Problem: Discharge Planning:  Goal: Discharged to appropriate level of care  Description  Discharged to appropriate level of care  8/12/2019 1113 by Bandar Carbajal RN  Outcome: Ongoing  8/12/2019 0334 by Merrill Cervantes RN  Outcome: Ongoing  Goal: Participates in care planning  Description  Participates in care planning  8/12/2019 1113 by Bandar Carbajal RN  Outcome: Ongoing  8/12/2019 0334 by Merrill Cervantes RN  Outcome: Ongoing     Problem: Airway Clearance - Ineffective:  Goal: Clear lung sounds  Description  Clear lung sounds  8/12/2019 1113 by Bandar Carbajal RN  Outcome: Ongoing  8/12/2019 0334 by Merrill Cervantes RN  Outcome: Ongoing  Goal: Ability to maintain a clear airway will improve  Description  Ability to maintain a clear airway will improve  8/12/2019 1113 by Bandar Carbajal RN  Outcome: Ongoing  8/12/2019 0334 by Merrill Cervantes RN  Outcome: Ongoing     Problem: Fluid Volume - Deficit:  Goal: Achieves intake and output within specified parameters  Description  Achieves intake FLUID AND ELECTROLYTE IMBALANCE  Goal: Fluid and electrolyte balance are achieved/maintained  8/12/2019 1113 by Eli Red RN  Outcome: Ongoing  8/12/2019 0334 by Yuriy Ocampo RN  Outcome: Ongoing  Note:     Patient's EF (Ejection Fraction) is greater than 40%    Patient's weights and intake/output reviewed:    Patient's Last Weight: 128 kg obtained by bed scale. Intake/Output Summary (Last 24 hours) at 8/12/2019 0335  Last data filed at 8/11/2019 2111  Gross per 24 hour   Intake 0 ml   Output 900 ml   Net -900 ml             Ongoing Functional Capacity Assessment:  Patient  unable to ambulate distance of 15 feet. Patient noted to be on 3 supplemental O2 with SpO2 of 95 %. Pt resting in bed at this time on BiPAP. Pt denies shortness of breath. Pt with pitting lower extremity edema. Patient and/or Family's stated Goal of Care this Admission: reduce shortness of breath, increase activity tolerance, better understand heart failure and disease management, be more comfortable and reduce lower extremity edema prior to discharge      Comorbidities Reviewed Yes  Patient has a past medical history of Arthritis, BiPAP (biphasic positive airway pressure) dependence, Bladder carcinoma ([address] CAD (coronary artery disease), COPD (chronic obstructive pulmonary disease) ([address] Diabetes mellitus ([address] Diverticulosis of colon (without mention of hemorrhage), DVT (deep vein thrombosis) in pregnancy ([address] Hyperlipemia, Hypertension, melanoma, Narcolepsy, GENESIS treated with BiPAP, Pulmonary embolism ([address] and Sleep apnea.          >>For CHF and Comorbidity documentation on Education Time and Topics, please see Education Tab         Problem: ACTIVITY INTOLERANCE/IMPAIRED MOBILITY  Goal: Mobility/activity is maintained at optimum level for patient  8/12/2019 1113 by Eli Red RN  Outcome: Ongoing  8/12/2019 0334 by Yuriy Ocampo RN  Outcome: Ongoing     Problem: Risk for Impaired Skin Integrity  Goal: Tissue

## 2019-08-12 NOTE — PROGRESS NOTES
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 He was placed on Rocephin and doxycycline on 8/3/2019. He was placed on BiPAP, received IV fluids for lactic acidosis. He could not tolerate his Trilogy. Due to hypoxemia out of proportion to imaging findings, he underwent CT chest PE protocol. This showed bilateral pulmonary emboli, acute cor pulmonale. He underwent catheter directed thrombolysis. Thoracentesis 8/9/2019 by IR    The patient follows with Dr. Natasha Hinds at [facility] [place]    Events of Last 24 hours: The patient had come down to oxygen at 3 LPM, increased back to 6 LPM.  He sat in a chair yesterday, overall feels much better. He is eating well. He slept with BiPAP. He is afebrile and hemodynamically stable. He has minimal cough, no chest pain. Family at bedside. IV:     dextrose         Vitals:  BP (!) 149/70   Pulse 91   Temp 98.3 °F (36.8 °C) (Oral)   Resp 18   Ht 5' 7\" (1.702 m)   Wt 283 lb 15.2 oz (128.8 kg)   SpO2 95%   BMI 44.47 kg/m²          Intake/Output Summary (Last 24 hours) at 8/12/2019 1818  Last data filed at 8/12/2019 1402  Gross per 24 hour   Intake --   Output 1100 ml   Net -1100 ml       EXAM:  General: Awake and oriented, conversant. No distress. Cheeks flushed. Eyes: PERRL. No scleral icterus. No conjunctival injection. ENT: No discharge. Pharynx clear. Class 3 airway, mucosa moist  Neck: Short and large neck, no JVD. Resp: No accessory muscle use, no crackles. No wheezing. No rhonchi. CV: Regular rate. Regular rhythm. No mumur or rub. Trace edema legs  GI: Protuberant abdomen. Skin: Warm and dry. No nodule on exposed extremities. No rash on exposed extremities. Lymph: Deferred.    M/S:

## 2019-08-12 NOTE — PROGRESS NOTES
Soft, non-tender, non-distended with normal bowel sounds. Musculoskeletal: No clubbing, cyanosis or edema bilaterally. Full range of motion without deformity. Skin: Skin color, texture, turgor normal.  No rashes or lesions. Neurologic:  Neurovascularly intact without any focal sensory/motor deficits. Cranial nerves: II-XII intact, grossly non-focal.  Psychiatric: Alert and oriented, thought content appropriate, normal insight  Capillary Refill: Brisk,< 3 seconds   Peripheral Pulses: +2 palpable, equal bilaterally       Labs:   Recent Labs     08/10/19  0537 08/11/19  0458 08/12/19  0459   WBC 6.5 5.7 6.2   HGB 12.8* 12.9* 13.0*   HCT 37.9* 38.5* 39.8*    314 320     Recent Labs     08/10/19  1153 08/11/19  0458 08/12/19  0459    140 139   K 4.0 3.7 3.5   CL 95* 94* 92*   CO2 37* 37* 38*   BUN 12 13 15   CREATININE <0.5* <0.5* <0.5*   CALCIUM 9.4 9.4 9.7   PHOS 2.9 3.8 4.1     No results for input(s): AST, ALT, BILIDIR, BILITOT, ALKPHOS in the last 72 hours. No results for input(s): INR in the last 72 hours. No results for input(s): Parker Samira in the last 72 hours. Urinalysis:      Lab Results   Component Value Date    NITRU Negative 08/03/2019    WBCUA 3-5 08/03/2019    BACTERIA Rare 08/03/2019    RBCUA  08/03/2019    BLOODU LARGE 08/03/2019    SPECGRAV 1.020 08/03/2019    GLUCOSEU Negative 08/03/2019    GLUCOSEU NEGATIVE 11/17/2010       Radiology:  XR CHEST STANDARD (2 VW)   Final Result   Partial clearing at the right lung base. XR CHEST 1 VW   Final Result   Persistent right basilar parenchymal disease. Right hilar enlargement is   noted. XR CHEST PORTABLE   Final Result   No pneumothorax, following right-sided thoracentesis. Right basilar atelectasis versus pneumonia. Vascular congestion. US THORACENTESIS   Final Result   Successful ultrasound guided right-sided thoracentesis.       A sample was sent for analysis at the request of the ordering clinician. CT CHEST WO CONTRAST   Final Result   There is a large right pleural effusion causing compressive atelectasis of   the right lower lobe, and trace left pleural effusion. There is calcific   coronary atherosclerosis and there is possible pulmonary venous hypertension. Airspace disease in the right upper lobe may represent atelectasis or   concurrent pneumonia         XR CHEST PORTABLE   Final Result   Continued right basilar opacity, suspicious for pneumonia. There may be   component of right pleural effusion. Aeration of the left lung is improved in the interval.         XR CHEST PORTABLE   Final Result   Increased right greater than left airspace disease, much more advanced on the   right. There also is a probable right pleural effusion, also increased over   the past 2 days. VL Extremity Venous Bilateral   Final Result      CT CHEST PULMONARY EMBOLISM W CONTRAST   Final Result   Extensive bilateral pulmonary embolism, including saddle embolism. Enlargement of the right ventricle, compatible with right heart strain. Moderate right pleural effusion. Partial atelectasis of the right lower   lobe. Additional patchy heterogeneous and ground-glass opacity can reflect   developing infarct or pneumonitis. 5 mm left upper lobe pulmonary nodule, for which continued follow-up is   recommended. Findings were discussed with Sonia French at 10:19 on 8/4/2019. RECOMMENDATIONS:   Fleischner Society guidelines for follow-up and management of incidentally   detected pulmonary nodules:      Single Solid Nodule:      Nodule size less than 6 mm   In a low-risk patient, no routine follow-up. In a high-risk patient, optional CT at 12 months. Nodule size equals 6-8 mm   In a low-risk patient, CT at 6-12 months, then consider CT at 18-24 months. In a high-risk patient, CT at 6-12 months, then CT at 18-24 months.       Nodule size greater than 8 mm         In a

## 2019-08-13 NOTE — PROGRESS NOTES
Pulmonary & Critical Care Inpatient Progress Note   Ching Jacobson MD     REASON FOR TODAY'S VISIT:  Acute on chronic resp failure    SUBJECTIVE:   Ongoing high oxygen requirements  Net negative fluid balance  Dry cough  No other complaints, thinks his home Trilogy is more comfortable than our BiPAP    Scheduled Meds:   apixaban  5 mg Oral BID    furosemide  40 mg Intravenous BID    ipratropium-albuterol  1 ampule Inhalation Q4H WA    famotidine  20 mg Oral BID    atorvastatin  40 mg Oral Nightly    insulin lispro  0-6 Units Subcutaneous TID WC    insulin lispro  0-3 Units Subcutaneous Nightly    buPROPion  150 mg Oral Daily    HYDROcodone-acetaminophen  1 tablet Oral TID    lisinopril  20 mg Oral Daily    tiZANidine  4 mg Oral Nightly    sodium chloride flush  10 mL Intravenous 2 times per day       Continuous Infusions:   dextrose         PRN Meds:  magnesium sulfate, HYDROcodone 5 mg - acetaminophen, morphine, glucose, dextrose, glucagon (rDNA), dextrose, sodium chloride flush, magnesium hydroxide, ondansetron, acetaminophen    ALLERGIES:  Patient has No Known Allergies. Objective:   PHYSICAL EXAM:  BP (!) 154/74   Pulse 92   Temp 98.4 °F (36.9 °C) (Oral)   Resp 20   Ht 5' 7\" (1.702 m)   Wt 282 lb 10.1 oz (128.2 kg)   SpO2 93%   BMI 44.27 kg/m²    Physical Exam   Constitutional: He appears well-developed and well-nourished. No distress. HENT:   Head: Normocephalic and atraumatic. Mouth/Throat: Oropharynx is clear and moist. No oropharyngeal exudate. Eyes: Pupils are equal, round, and reactive to light. EOM are normal.   Neck: Neck supple. No JVD present. Cardiovascular: Normal heart sounds. Exam reveals no gallop and no friction rub. No murmur heard. Pulmonary/Chest: Effort normal. He has no wheezes. He has no rales. Equal chest rise and expansion bilaterally   Abdominal: Soft. Bowel sounds are normal. He exhibits no distension. There is no tenderness.    Musculoskeletal: Normal appears to remain small, monitor with serial imaging      Ayo Daniels MD

## 2019-08-13 NOTE — PROGRESS NOTES
Physical Therapy  Facility/Department: [facility]  Daily Treatment Note  NAME: Fish Mello  : 10/17/1933  MRN: [id number]    Date of Service: 2019    Discharge Recommendations:  Subacute/Skilled Nursing Facility   PT Equipment Recommendations  Equipment Needed: No    Assessment   Body structures, Functions, Activity limitations: Decreased functional mobility ; Decreased endurance;Decreased strength;Decreased safe awareness;Decreased balance  Assessment: 79 yo male adm wtih PNA, O2 needs have improved from 10L to 4L via nc. Pt participated in 10-20 reps LE ex, sit to and from stand transfers 4x, able to step in place only 3 x B. Pt limited by endurance and fear of falls. AM PAC score of 12 indicates pt would benefit from skilled PT to address current deficits. Recommend SNF at discharge  Treatment Diagnosis: Decreased strength, endurance, and functional mobility  Specific instructions for Next Treatment: Progress ther ex and mobility as tolerated  Prognosis: Good  PT Education: Goals; Functional Mobility Training;PT Role;Transfer Training;Plan of Care;General Safety; Energy Conservation  Patient Education: Energy conservation, breathing techniques, transfer techniques  Barriers to Learning: none, pt voices understanding  REQUIRES PT FOLLOW UP: Yes  Activity Tolerance  Activity Tolerance: Patient limited by endurance; Patient limited by fatigue;Patient Tolerated treatment well  Activity Tolerance:       Patient Diagnosis(es): The primary encounter diagnosis was Acute respiratory failure with hypoxia ([facility] [address] Diagnoses of Pneumonia of right lung due to infectious organism, unspecified part of lung and Severe sepsis [facility]) were also pertinent to this visit.      has a past medical history of Arthritis, BiPAP (biphasic positive airway pressure) dependence, Bladder carcinoma ([facility] [address] CAD (coronary artery disease), COPD (chronic obstructive pulmonary disease) ([facility] [address] Diabetes mellitus ([facility] [address] Diverticulosis of colon procurement  Safety Devices  Type of devices: All fall risk precautions in place, Gait belt, Patient at risk for falls, Nurse notified, Call light within reach, Chair alarm in place, Left in chair    Therapy Time   Individual Concurrent Group Co-treatment   Time In 0915         Time Out 0943         Minutes 28               If pt discharges prior to next PT session this note will serve as discharge summary.   Emily Wayne, [id number]

## 2019-08-13 NOTE — PROGRESS NOTES
Hospitalist Progress Note      PCP: Aurora Solis, APRN - CNP    Date of Admission: 8/3/2019    Chief Complaint: shortness of breath    Hospital Course: admitted with shortness of breath. Found to have PE, pneumonia and pleural effusion. No malignancy on the fluid. Slow improvement being noted, no changes since yesterday. Pulmonology recommends bronchoscopy due to lack of improvement    Subjective: no chest pain, has shortness of breath with exertion and also at rest. No nausea or vomiting        Medications:  Reviewed    Infusion Medications    dextrose       Scheduled Medications    apixaban  5 mg Oral BID    furosemide  40 mg Intravenous BID    ipratropium-albuterol  1 ampule Inhalation Q4H WA    famotidine  20 mg Oral BID    atorvastatin  40 mg Oral Nightly    insulin lispro  0-6 Units Subcutaneous TID WC    insulin lispro  0-3 Units Subcutaneous Nightly    buPROPion  150 mg Oral Daily    HYDROcodone-acetaminophen  1 tablet Oral TID    lisinopril  20 mg Oral Daily    tiZANidine  4 mg Oral Nightly    sodium chloride flush  10 mL Intravenous 2 times per day     PRN Meds: magnesium sulfate, HYDROcodone 5 mg - acetaminophen, morphine, glucose, dextrose, glucagon (rDNA), dextrose, sodium chloride flush, magnesium hydroxide, ondansetron, acetaminophen      Intake/Output Summary (Last 24 hours) at 8/13/2019 1550  Last data filed at 8/13/2019 1431  Gross per 24 hour   Intake 240 ml   Output 750 ml   Net -510 ml       Physical Exam Performed:    BP (!) 159/75   Pulse 86   Temp 98.3 °F (36.8 °C) (Oral)   Resp 18   Ht 5' 7\" (1.702 m)   Wt 282 lb 10.1 oz (128.2 kg)   SpO2 95%   BMI 44.27 kg/m²     General appearance: No apparent distress, appears stated age and cooperative. HEENT: Pupils equal, round, and reactive to light. Conjunctivae/corneas clear. Neck: Supple, with full range of motion. No jugular venous distention. Trachea midline. Respiratory:  Normal respiratory effort.  RLL

## 2019-08-13 NOTE — PROGRESS NOTES
08/13/19 0352   NIV Type   Equipment Type V60   Mode BIPAP   Mask Type Full face mask   Mask Size Large   Settings/Measurements   Comfort Level Good   Using Accessory Muscles No   IPAP 18 cmH20   EPAP 10 cmH2O   Resp 26   SpO2 97   FiO2  45 %   Vt Exhaled 970 mL   Mask Leak (lpm) 8 lpm   Skin Protection for O2 Device Yes

## 2019-08-13 NOTE — PROGRESS NOTES
Occupational Therapy  Facility/Department: [facility] C[facility]U  Daily Treatment Note  NAME: Ulisses Barnes  : 10/17/1933  MRN: [id number]    Date of Service: 2019    Discharge Recommendations:  Subacute/Skilled Nursing Facility  OT Equipment Recommendations  Other: defer to next level of care     Assessment   Performance deficits / Impairments: Decreased functional mobility ; Decreased safe awareness;Decreased balance;Decreased cognition;Decreased ADL status; Decreased endurance;Decreased strength  Assessment: Pt limited at this time d/t fatigue and shorteness of breath. Pt continues to require mod A x2 for sit<>stand with STEDY. Pt mod A x2 for toilet transfer. Pt requires frequent rest breaks. Cont with POC. OT Education: OT Role;Home Exercise Program  Patient Education: role of OT, safety  REQUIRES OT FOLLOW UP: Yes  Activity Tolerance  Activity Tolerance: Patient Tolerated treatment well  Safety Devices  Safety Devices in place: Yes  Type of devices: Call light within reach;Gait belt;Nurse notified; Chair alarm in place; Left in chair         Patient Diagnosis(es): The primary encounter diagnosis was Acute respiratory failure with hypoxia ([address] Diagnoses of Pneumonia of right lung due to infectious organism, unspecified part of lung and Severe sepsis [facility]) were also pertinent to this visit. has a past medical history of Arthritis, BiPAP (biphasic positive airway pressure) dependence, Bladder carcinoma ([address] CAD (coronary artery disease), COPD (chronic obstructive pulmonary disease) ([address] Diabetes mellitus ([address] Diverticulosis of colon (without mention of hemorrhage), DVT (deep vein thrombosis) in pregnancy ([address] Hyperlipemia, Hypertension, melanoma, Narcolepsy, GENESIS treated with BiPAP, Pulmonary embolism ([address] and Sleep apnea. has a past surgical history that includes Abdomen surgery; Colon surgery (10/25/10); pr colostomy (11/3/10); Cystoscopy; other surgical history (11);  Colonoscopy Comments: STEDY                       Cognition  Overall Cognitive Status: Exceptions  Following Commands: Follows one step commands consistently  Attention Span: Attends with cues to redirect  Safety Judgement: Decreased awareness of need for assistance;Decreased awareness of need for safety  Problem Solving: Assistance required to generate solutions;Assistance required to identify errors made  Insights: Decreased awareness of deficits  Initiation: Requires cues for some  Sequencing: Requires cues for some                    Type of ROM/Therapeutic Exercise  Type of ROM/Therapeutic Exercise: AROM  Exercises  Elbow Flexion: 15x  Elbow Extension: 15x  Supination: 10x AROM  Pronation: 10x AROM  Wrist Flexion: 15x  Wrist Extension: 15x  Other: Performed ankle pumps 10x                     Plan   Plan  Times per week: 3-5x  Current Treatment Recommendations: Strengthening, Endurance Training, Functional Mobility Training, Balance Training, Self-Care / ADL    AM-PAC Score        AM-[facility] Inpatient Daily Activity Raw Score: 12 (08/13/19 0954)  AM-PAC Inpatient ADL T-Scale Score : 30.6 (08/13/19 0954)  ADL Inpatient CMS 0-100% Score: 66.57 (08/13/19 0954)  ADL Inpatient CMS G-Code Modifier : CL (08/13/19 3066)    Goals  Short term goals  Time Frame for Short term goals: 1 week (8/13/19)  Short term goal 1: Pt will complete functional transfer with min A. Short term goal 2: Pt will complete bed mobility with min A. Short term goal 3: Pt will stand for 3 minutes with min A for increased dynamic standing. Short term goal 4: Pt will complete 10-15 reps of BUE exercises for increased endurance.  (8/10/19)  Patient Goals   Patient goals : \"to be able to walk to get my phone\"       Therapy Time   Individual Concurrent Group Co-treatment   Time In 0915         Time Out 0944         Minutes 29         Timed Code Treatment Minutes: [address] [facility], OTR/L

## 2019-08-13 NOTE — PROGRESS NOTES
Speech Language Pathology  Attempt Note    SLP attempted dysphagia f/u, however, pt currently working with RT. ST to continue to follow and re-attempt f/u at a later time.     Thank you,  Kem Callahan M.S. [facility]  Speech-language pathologist  [phone]

## 2019-08-14 NOTE — PROGRESS NOTES
Colonoscopy (11/7/2011); colostomy (11/8/11); Prostate surgery; other surgical history (2012); other surgical history; TURP (2012); Cystoscopy (2/6/13); TURP (2/20/13); Cystoscopy (4/3/2013); other surgical history (11/20/2013); joint replacement; Cystocopy (3/19/14); Cystocopy (9/3/14); Cystoscopy (2/25/15); other surgical history (07/22/2015); Cystoscopy (N/A, 1/20/16); and Cystoscopy (09/21/2016).     Restrictions  Restrictions/Precautions  Restrictions/Precautions: Up as Tolerated, General Precautions, Fall Risk  Position Activity Restriction  Other position/activity restrictions: 4L O2 ( previously at 10 L), continuouse bulse ox (at baseline uses 2.5L O2 at night)     Subjective   General  Chart Reviewed: Yes  Patient assessed for rehabilitation services?: Yes  Response to previous treatment: Patient with no complaints from previous session  Family / Caregiver Present: No  Referring Practitioner: DONNIE Bass  Diagnosis: pneumonia  Subjective  Subjective: Pt agreeable to therapy  General Comment  Comments: RN approved therapy  Vital Signs  Patient Currently in Pain: Denies     Orientation  Orientation  Overall Orientation Status: Within Functional Limits     Objective    ADL  LE Dressing: Dependent/Total(brief management)  Toileting: Dependent/Total(incontinent upon arrival, assist with use of urinal)        Balance  Sitting Balance: Contact guard assistance  Standing Balance: Dependent/Total(mod A x2 STEDY)  Standing Balance  Time: ~2 minutes   Activity: STEDY bed to chair  Functional Mobility  Functional - Mobility Device: Other(STEDY)  Activity: Other  Assist Level: Dependent/Total  Bed mobility  Rolling to Left: Minimal assistance  Supine to Sit: Minimal assistance(HHA)  Sit to Supine: Unable to assess(up in chair at end of session\)  Transfers  Sit to stand: Dependent/Total;2 Person assistance  Stand to sit: Dependent/Total;2 Person assistance  Transfer Comments: mod A x2 STEDY Cognition  Overall Cognitive Status: Exceptions  Following Commands: Follows one step commands consistently  Attention Span: Attends with cues to redirect  Safety Judgement: Decreased awareness of need for assistance;Decreased awareness of need for safety  Problem Solving: Assistance required to generate solutions;Assistance required to identify errors made  Insights: Decreased awareness of deficits  Initiation: Requires cues for some  Sequencing: Requires cues for some                                         Plan   Plan  Times per week: 3-5x  Current Treatment Recommendations: Strengthening, Endurance Training, Functional Mobility Training, Balance Training, Self-Care / ADL    AM-PAC Score        AM-[facility] Inpatient Daily Activity Raw Score: 12 (08/14/19 1134)  AM-PAC Inpatient ADL T-Scale Score : 30.6 (08/14/19 1134)  ADL Inpatient CMS 0-100% Score: 66.57 (08/14/19 1134)  ADL Inpatient CMS G-Code Modifier : CL (08/14/19 1134)    Goals  Short term goals  Time Frame for Short term goals: 1 week (8/13/19)  Short term goal 1: Pt will complete functional transfer with min A. Short term goal 2: Pt will complete bed mobility with min A. Short term goal 3: Pt will stand for 3 minutes with min A for increased dynamic standing. Short term goal 4: Pt will complete 10-15 reps of BUE exercises for increased endurance.  (8/10/19)  Patient Goals   Patient goals : \"to be able to walk to get my phone\"       Therapy Time   Individual Concurrent Group Co-treatment   Time In 0933         Time Out 1000         Minutes 27         Timed Code Treatment Minutes: 27 Minutes       Batsheva Lakhani OTR/L

## 2019-08-14 NOTE — OP NOTE
Preoperative Diagnosis:  1. Acute resp failure  2. Right lower lobe atelectasis     Postoperative Diagnosis:  1. Acute resp failure  2. Right lower lobe atelectasis     Procedure Performed:  1. Flexible bronchoscopy  2. Therapeutic aspiration of endobronchial secretions  3. Bronchoalveolar lavage of right lower lobe    Findings:  1. Improved airway patency after aspiration of secretions from the tracheobronchial tree    Recommendations:  1. Await results of collected specimen    Indications:  Dian Rahman is a pleasant 80year old male who has had ongoing resp failure and persistent right lower lobe atelectasis on imaging. Bronchoscopy indicated for further evaluation of findings and to assist with pulmonary toileting. After review of the procedure and associated risks consent was obtained to proceed. ASA 3, Mallampati 2    Procedure Details: The patient was correctly identified as  Dian Rahman, a safety timeout was performed. After sedation was administered with versed and fentanyl intravenously as well as topical lidocaine an adult therapeutic bronchoscope was inserted through the mouth and into the airways. Tenacious clear secretions were aspirated from the tracheobronchial tree with subsequent improved airway patency. An airway exam was then performed, all subsegments were well visualized and did not appear to show any acute abnormality. The bronchoscope was wedged into the right lower lobe and a bronchoalveolar lavage was performed. Bronchoscope was withdrawn and the procedure was terminated. There was no immediate complications, the patient tolerated the procedure well. Estimated blood loss was less than 1 cc. Specimens:  RLL BAL    Sedation Details:  Sedation start time 1148  Sedation stop time 1158  Total moderate sedation time 10 minutes  I was physically present and the patient was monitored continuously 1:1 throughout the entire procedure while administering sedation.

## 2019-08-14 NOTE — PROGRESS NOTES
procurement  Safety Devices  Type of devices:  All fall risk precautions in place, Gait belt, Patient at risk for falls, Nurse notified, Call light within reach, Chair alarm in place, Left in chair  Restraints  Initially in place: No     Therapy Time   Individual Concurrent Group Co-treatment   Time In 0933         Time Out 1000         Minutes 27         Timed Code Treatment Minutes: 32 Minutes       James Fung, PT

## 2019-08-14 NOTE — PLAN OF CARE
filed at 8/14/2019 0555  Gross per 24 hour   Intake 480 ml   Output 1625 ml   Net -1145 ml         Patient stated Daily Functional Goal: (Note:help the patient identify a challenging but achievable goal per shift that can aid in progression towards increased functional capacity at discharge ie sit in the chair for x amount of time, Decrease walk time by x seconds, stand or walk with minimal assistance, decrease pain to a level of x/10, etc) Please Delete Instructions and Fill In with the Patient's Daily Goal**      Pt resting in bed at this time on 4 L O2. Pt with complaints of shortness of breath. Pt with pitting lower extremity edema. Patient and/or Family's stated Goal of Care this Admission: reduce shortness of breath, increase activity tolerance, better understand heart failure and disease management, be more comfortable and reduce lower extremity edema prior to discharge      Comorbidities Reviewed No  Patient has a past medical history of Arthritis, BiPAP (biphasic positive airway pressure) dependence, Bladder carcinoma ([address] CAD (coronary artery disease), COPD (chronic obstructive pulmonary disease) ([address] Diabetes mellitus ([address] Diverticulosis of colon (without mention of hemorrhage), DVT (deep vein thrombosis) in pregnancy ([address] Hyperlipemia, Hypertension, melanoma, Narcolepsy, GENESIS treated with BiPAP, Pulmonary embolism ([address] and Sleep apnea.          >>For CHF and Comorbidity documentation on Education Time and Topics, please see Education Tab         8/14/2019 0100 by Alex Bolanos RN  Outcome: Ongoing

## 2019-08-15 PROBLEM — J44.9 COPD (CHRONIC OBSTRUCTIVE PULMONARY DISEASE) (HCC): Status: ACTIVE | Noted: 2019-01-01

## 2019-08-15 NOTE — PATIENT CARE CONFERENCE
assist with lower body dressing  Toiletin - Able to perform 1 task only (e.g. hygiene)  Toilet Transfer: 3 - Requires 25-49% assist getting off toilet  Tub Transfer: 0 - Activity does not occur  Shower Transfer: 2 - Maximal assistance, pt. expends 25%-49% effort      Assessment: Session 1: pt cont to require min/mod A for UB ADLs and total A for LB ADLs with increased time to complete, pt completes grooming seated at sink with setup. Pt tolerates B UE ex with 2# weights with rest breaks between ex. Pt SOB with min activity, but maintains sats > 93% throughout session on 3L O2. Ssecond session: Pt SOB with min activity, ed on PLB and using pinwheel to blow out. Pt tolerates 5 sit to stands with rest breaks between each, O2 sats > 93% throughout both sessions. COnt POC. Speech therapy observed barriers to dc:    Baseline:    Current level:   Barriers to DC:   Needs in order to achieve dc home/next level of care:    Speech Therapy interventions:  Dysphagia:    Speech/Language/Cognition:        SPEECH THERAPY (intentionally left blank if not actively being seen by this service):       NUTRITION  Weight: 285 lb 4.4 oz (129.4 kg) / Body mass index is 44.68 kg/m². Diet Order: DIET CARB CONTROL; No Drinking Straw  PO Meals Eaten (%): 76 - 100%  Education: Declined      CASE MANAGEMENT  Assessment:  Patient desires strengthening while in the ARU so that he may ambulate with his walker safely and enter his home safely. Family is supportive of his return to his own home and is available should he need 24/ supervision or assistance.       Interdisciplinary Goals:   1.)Pt will complete LB ADLs with mod A or less  2.) Pt will perform bed to chair transfers using RW with SBA.  3.)  4.)  5.)    Discharge Plan   Estimated discharge date:2019  Destination:  Home with family support  Services at Discharge:  Aliciasujit  PT/OT   Equipment at Discharge: patient has Wheelchair, SC, RW, rollator, handles around the toilet, O2, sock aide and reacher    Team Members Present at Conference:  : [facility]    Occupational Therapist: Gabriela Vang, OTR/L  Physical Therapist: Jocelyn Fernando, MS PT  Speech Therapist: PALLAVI  Nurse: Elisabet Douglass, RN BSN   Dietician: Kellie Velazquez RDN, RAMY  : Jennie Ayers  Psychiatry: PALLAVI    Family members present at conference:Pallavi      I led this team conference and I approve the established interdisciplinary plan of care as documented within the medical record of Margo Simpson. MD: Kirsty Perez.  Alena Barrera MD 8/21/2019, 1:13 PM

## 2019-08-15 NOTE — PROGRESS NOTES
Pulmonary & Critical Care Inpatient Progress Note   Martín Briones MD     REASON FOR TODAY'S VISIT:  Acute on chronic resp failure    SUBJECTIVE:   Down to baseline 3 LPM of oxygen  No new complaints    Scheduled Meds:   apixaban  5 mg Oral BID    furosemide  40 mg Intravenous BID    ipratropium-albuterol  1 ampule Inhalation Q4H WA    famotidine  20 mg Oral BID    atorvastatin  40 mg Oral Nightly    insulin lispro  0-6 Units Subcutaneous TID WC    insulin lispro  0-3 Units Subcutaneous Nightly    buPROPion  150 mg Oral Daily    HYDROcodone-acetaminophen  1 tablet Oral TID    lisinopril  20 mg Oral Daily    tiZANidine  4 mg Oral Nightly    sodium chloride flush  10 mL Intravenous 2 times per day       Continuous Infusions:   dextrose         PRN Meds:  magnesium sulfate, HYDROcodone 5 mg - acetaminophen, morphine, glucose, dextrose, glucagon (rDNA), dextrose, sodium chloride flush, magnesium hydroxide, ondansetron, acetaminophen    ALLERGIES:  Patient has No Known Allergies. Objective:   PHYSICAL EXAM:  BP (!) 150/70   Pulse 87   Temp 98.1 °F (36.7 °C) (Axillary)   Resp 18   Ht 5' 7\" (1.702 m)   Wt 285 lb 4.4 oz (129.4 kg)   SpO2 97%   BMI 44.68 kg/m²    Physical Exam   Constitutional: He appears well-developed and well-nourished. No distress. HENT:   Head: Normocephalic and atraumatic. Mouth/Throat: Oropharynx is clear and moist. No oropharyngeal exudate. Eyes: Pupils are equal, round, and reactive to light. EOM are normal.   Neck: Neck supple. No JVD present. Cardiovascular: Normal heart sounds. Exam reveals no gallop and no friction rub. No murmur heard. Pulmonary/Chest: Effort normal. He has no wheezes. He has no rales. Equal chest rise and expansion bilaterally   Abdominal: Soft. Bowel sounds are normal. He exhibits no distension. There is no tenderness. Musculoskeletal: Normal range of motion. He exhibits no edema. Lymphadenopathy:     He has no cervical adenopathy.

## 2019-08-15 NOTE — CARE COORDINATION
CASE MANAGEMENT DISCHARGE SUMMARY      Discharge to: Acute Rehab Unit     Precertification completed: [address] Exemption Notification (HENS) completed: n/a        Notified: patient aware of discharge plan    Family: family aware of discharge plan    Facility/Agency: [facility] faxed   RN: Irlanda Brenenr aware of discharge plan    Phone number for report to facility: [phone] [facility] aware that patient admitting to ARU at 4pm.     Note: Discharging nurse to complete [facility], reconcile [facility]S, and place final copy with patient's discharge packet. RN to ensure that written prescriptions for  Level II medications are sent with patient to the facility as per protocol.

## 2019-08-15 NOTE — PROGRESS NOTES
4 Eyes Skin Assessment     The patient is being assess for   Admission    I agree that 2 RN's have performed a thorough Head to Toe Skin Assessment on the patient. ALL assessment sites listed below have been assessed. Areas assessed by both nurses:   [x]   Head, Face, and Ears   [x]   Shoulders, Back, and Chest, Abdomen  [x]   Arms, Elbows, and Hands   [x]   Coccyx, Sacrum, and Ischium  [x]   Legs, Feet, and Heels            **SHARE this note so that the co-signing nurse is able to place an eSignature**    Co-signer eSignature: Electronically signed by Bentley Cunningham RN on 8/15/19 at 6:48 PM    Does the Patient have Skin Breakdown?   No          Vince Prevention initiated:  Yes   Wound Care Orders initiated:  Yes      [id number]  nurse consulted for Pressure Injury (Stage 3,4, Unstageable, DTI, NWPT, Complex wounds)and New or Established Ostomies:  NA      Primary Nurse eSignature: Electronically signed by Flor Rangel RN on 8/15/19 at 6:48 PM

## 2019-08-15 NOTE — PLAN OF CARE
Problem: Pain:  Goal: Pain level will decrease  Description  Pain level will decrease  Outcome: Ongoing     Problem: Falls - Risk of:  Goal: Will remain free from falls  Description  Will remain free from falls  Outcome: Ongoing     Problem: Airway Clearance - Ineffective:  Goal: Clear lung sounds  Description  Clear lung sounds  Outcome: Ongoing     Problem: OXYGENATION/RESPIRATORY FUNCTION  Goal: Patient will maintain patent airway  Outcome: Ongoing

## 2019-08-15 NOTE — PROGRESS NOTES
Hospitalist Progress Note      PCP: Clarisa Rice APRN - CNP    Date of Admission: 8/3/2019    Chief Complaint: shortness of breath    Hospital Course: admitted with shortness of breath. Found to have PE, pneumonia and pleural effusion. No malignancy on the fluid. Slow improvement being noted. Pulmonology performed bronchoscopy. Lavage performed. Cultures are sent. Did well after bronchoscopy. On oxygen and nighttime BiPAP. Tolerating Eliquis. Subjective: no chest pain, has shortness of breath with exertion and also at rest, stable. No nausea or vomiting . No new issues past 24 hours. Medications:  Reviewed    Infusion Medications    dextrose       Scheduled Medications    apixaban  5 mg Oral BID    furosemide  40 mg Intravenous BID    ipratropium-albuterol  1 ampule Inhalation Q4H WA    famotidine  20 mg Oral BID    atorvastatin  40 mg Oral Nightly    insulin lispro  0-6 Units Subcutaneous TID WC    insulin lispro  0-3 Units Subcutaneous Nightly    buPROPion  150 mg Oral Daily    HYDROcodone-acetaminophen  1 tablet Oral TID    lisinopril  20 mg Oral Daily    tiZANidine  4 mg Oral Nightly    sodium chloride flush  10 mL Intravenous 2 times per day     PRN Meds: magnesium sulfate, HYDROcodone 5 mg - acetaminophen, morphine, glucose, dextrose, glucagon (rDNA), dextrose, sodium chloride flush, magnesium hydroxide, ondansetron, acetaminophen      Intake/Output Summary (Last 24 hours) at 8/15/2019 1147  Last data filed at 8/15/2019 3950  Gross per 24 hour   Intake 680 ml   Output 550 ml   Net 130 ml       Physical Exam Performed:    BP (!) 150/70   Pulse 87   Temp 98.1 °F (36.7 °C) (Axillary)   Resp 18   Ht 5' 7\" (1.702 m)   Wt 285 lb 4.4 oz (129.4 kg)   SpO2 94%   BMI 44.68 kg/m²     General appearance: No apparent distress, appears stated age and cooperative. HEENT: Pupils equal, round, and reactive to light. Conjunctivae/corneas clear.   Neck: Supple, with full range of VW)   Final Result   Partial clearing at the right lung base. XR CHEST 1 VW   Final Result   Persistent right basilar parenchymal disease. Right hilar enlargement is   noted. XR CHEST PORTABLE   Final Result   No pneumothorax, following right-sided thoracentesis. Right basilar atelectasis versus pneumonia. Vascular congestion. US THORACENTESIS   Final Result   Successful ultrasound guided right-sided thoracentesis. A sample was sent for analysis at the request of the ordering clinician. CT CHEST WO CONTRAST   Final Result   There is a large right pleural effusion causing compressive atelectasis of   the right lower lobe, and trace left pleural effusion. There is calcific   coronary atherosclerosis and there is possible pulmonary venous hypertension. Airspace disease in the right upper lobe may represent atelectasis or   concurrent pneumonia         XR CHEST PORTABLE   Final Result   Continued right basilar opacity, suspicious for pneumonia. There may be   component of right pleural effusion. Aeration of the left lung is improved in the interval.         XR CHEST PORTABLE   Final Result   Increased right greater than left airspace disease, much more advanced on the   right. There also is a probable right pleural effusion, also increased over   the past 2 days. VL Extremity Venous Bilateral   Final Result      CT CHEST PULMONARY EMBOLISM W CONTRAST   Final Result   Extensive bilateral pulmonary embolism, including saddle embolism. Enlargement of the right ventricle, compatible with right heart strain. Moderate right pleural effusion. Partial atelectasis of the right lower   lobe. Additional patchy heterogeneous and ground-glass opacity can reflect   developing infarct or pneumonitis. 5 mm left upper lobe pulmonary nodule, for which continued follow-up is   recommended. Findings were discussed with Yobani Edouard at 10:19 on 8/4/2019.

## 2019-08-15 NOTE — PROGRESS NOTES
protocol with helton removal  [x] Maintain O2 SATs at _>90__%  [x] Have pain managed while on ARU       [] Be pain free by discharge   [x] Have no skin breakdown while on ARU  [] Have improved skin integrity via wound measurements  [] Have no signs/symptoms of infection at the wound site  [x] Be free from injury during hospitalization   [] Complete education with patient/family with understanding demonstrated for:  [] Adjustment   [] Other:   Nursing interventions may include bowel/bladder training, education for medical assistive devices, medication education, O2 saturation management, energy conservation, stress management techniques, fall prevention, alarms protocol, seating and positioning, skin/wound care, pressure relief instruction,dressing changes,  infection protection, DVT prophylaxis, and/or assistance with in room safety with transfers to bed, toilet, wheelchair, shower as well as bathroom activities and hygiene. Patient/caregiver education for:   [x] Disease/sustained injury/management      [x] Medication Use   [] Surgical intervention   [x] Safety   [x] Body mechanics and or joint protection   [x] Health maintenance         PHYSICAL THERAPY:  Goals:                  Short term goals  Time Frame for Short term goals: 1 week  Short term goal 1: Patient will perform sit<>supine with CGA. Short term goal 2: Patient will perform sit<>stand transfers with CGA and LRAD. Short term goal 3: Patient will perform bed<>chair transfers with CGA and LRAD. Short term goal 4: Patient will ambulated 25 ft with CGA and LRAD. Short term goal 5: Patient will ascend/descend 3 stairs with L HR and min A. Long term goals  Time Frame for Long term goals : 2 weeks  Long term goal 1: Patient will independently perform sit<>supine. Long term goal 2: Patient will perform sit<>stand transfers with MI and LRAD. Long term goal 3: Patient will ambulate 100 ft with MI and LRAD.   Long term goal 4: Patient will ascend/descend 3 stairs with L HR and Supervision. Long term goal 5: Patient will demonstrate ind of seated/supine HEP to maintain functional strength. These goals were reviewed with this patient at the time of assessment and Alya Boyer is in agreement.      Plan of Care: Pt to be seen 5 out of 7 days per week, 90  mins (exact) per day for 14 days (exact)                   Current Treatment Recommendations: Strengthening, Transfer Training, Endurance Training, Balance Training, Gait Training, Functional Mobility Training, Safety Education & Training, ROM, Patient/Caregiver Education & Training, Equipment Evaluation, Education, & procurement, Stair training, Home Exercise Program      OCCUPATIONAL THERAPY:  Goals:             Short term goals  Time Frame for Short term goals: 1 week  Short term goal 1: Pt will complete functional transfers with min A  Short term goal 2: Pt will complete LB ADLs with mod A or less  Short term goal 3: Pt will complete toileting with mod A or less  Short term goal 4: Pt will tolerate stnading at sink for 2 grooming tasks with CGA, w/o fatigue :  Long term goals  Time Frame for Long term goals : 2 weeks   Long term goal 1: Pt will complete UB/LB ADLs Mod I with AE as needed  Long term goal 2: Pt will complete toileting Mod I   Long term goal 3: Pt will complete all functional transfers mod I   Long term goal 4: Pt will complete tub transfer with TTB Mod I   Long term goal 5: Pt will complete light meal prep/home mgmt tasks mod I :    These goals were reviewed with this patient at the time of assessment and Ayla Boyer is in agreement    Plan of Care:  Pt to be seen 5 out of 7 days per week, 90 mins (exact) per day for 14 days (exact)  Current Treatment Recommendations: Strengthening, Endurance Training, Functional Mobility Training, Balance Training, Self-Care / ADL, Patient/Caregiver Education & Training, Equipment Evaluation, Education, & procurement      SPEECH

## 2019-08-16 NOTE — PLAN OF CARE
Fall precautions in place. Bed locked in lowest position. Nonskid socks on. Call light in place. Bed alarm on.

## 2019-08-16 NOTE — H&P
smokeless tobacco use includes chew. He reports that he does not drink alcohol or use drugs. family history is not on file.     Current Facility-Administered Medications   Medication Dose Route Frequency Provider Last Rate Last Dose    hydrALAZINE (APRESOLINE) tablet 50 mg  50 mg Oral Q6H PRN Marissa Olivares MD        ondansetron (ZOFRAN-ODT) disintegrating tablet 8 mg  8 mg Oral Q8H PRN Marissa Olivares MD        traZODone (DESYREL) tablet 50 mg  50 mg Oral Nightly PRN Marissa Olivares MD        acetaminophen (TYLENOL) tablet 650 mg  650 mg Oral Q4H PRN Casey Byrd MD        ipratropium-albuterol (DUONEB) nebulizer solution 1 ampule  1 ampule Inhalation Q4H WA Casey Byrd MD   1 ampule at 08/16/19 1216    magnesium hydroxide (MILK OF MAGNESIA) 400 MG/5ML suspension 30 mL  30 mL Oral Daily PRN Casey Byrd MD        apixaban (ELIQUIS) tablet 5 mg  5 mg Oral BID Casey Byrd MD   5 mg at 08/16/19 1007    atorvastatin (LIPITOR) tablet 40 mg  40 mg Oral Nightly Casey Byrd MD   40 mg at 08/15/19 2107    buPROPion (WELLBUTRIN XL) extended release tablet 150 mg  150 mg Oral Daily Casey Byrd MD   150 mg at 08/16/19 1007    dextrose 5 % solution  100 mL/hr Intravenous PRN Casey Byrd MD        dextrose 50 % IV solution  12.5 g Intravenous PRN aCsey Byrd MD        famotidine (PEPCID) tablet 20 mg  20 mg Oral BID Casey Byrd MD   20 mg at 08/16/19 1007    furosemide (LASIX) tablet 40 mg  40 mg Oral BID Casey Byrd MD   40 mg at 08/16/19 1007    glucagon (rDNA) injection 1 mg  1 mg Intramuscular PRN Casey Byrd MD        glucose (GLUTOSE) 40 % oral gel 15 g  15 g Oral PRN Casey Byrd MD        HYDROcodone-acetaminophen (NORCO) 5-325 MG per tablet 1 tablet  1 tablet Oral Q6H PRN Casey Byrd MD   1 tablet at 08/16/19 1007    insulin lispro (HUMALOG) injection vial 0-6 Units  0-6 Units Subcutaneous TID  Casey Byrd MD   1 Units at     Radiopaque device of uncertain etiology projects over the left apex. Correlate with physical exam.           XR CHEST STANDARD (2 VW)   Final Result   Right perihilar infiltrate, likely a pneumonia. No overt failure or   significant pleural fluid.           The above laboratory data have been reviewed. The above imaging data have been reviewed. The above medical testing have been reviewed. Body mass index is 44.68 kg/m². Barriers to Discharge: weakness, endurance, medical comorbidities    POST ADMISSION PHYSICIAN EVALUATION  The patient has agreed to being admitted to our comprehensive inpatient  rehabilitation facility consisting of at least 180 minutes of therapy a day,  5 out of 7 days a week. The patient/family has a good understanding of our discharge process. The  patient has potential to make improvement and is in need of at least two of  the following multidisciplinary therapies including but not limited to  physical, occupational, respiratory, and speech, nutritional services, wound care, and prosthetics and orthotics. Given the patients complex condition  and risk of further medical complications, rehabilitation services cannot be  safely provided at a lower level of care such as a skilled nursing facility. I have compared the patients medical and functional status at the time of the  preadmission screening and the same on this date, and there are no significant changes. By signing this document, I acknowledge that I have personally performed a  full physical examination on this patient within 24 hours of admission to  this inpatient rehabilitation facility and have determined the patient to be  able to tolerate the above course of treatment at an intensive level for a  reasonable period of time.  I will be completing a detailed individualized  Plan of Care for this patient by day four of the patients stay based upon the  Preadmission Screen, this Post-Admission Evaluation, and the therapy  evaluations. Rehabilitation Diagnosis:  16.0, Debility (non-cardiac, non-pulmonary    Assessment and Plan:  Community-acquired pneumonia. Antibiotics completed. All cultures no growth to date. Pleural effusion status post thoracentesis. Cytology negative. Repeat chest x-ray next week. Pulmonary embolism status post thrombolysis. Continue anticoagulation. COPD. Outpatient follow-up with pulmonology. Continue current regimen. Continue supplemental oxygen. Acute diastolic congestive heart failure. Continue diuretics. Follow daily weights. Bowels: Schedule colace + senna. Follow bowel movements. Enema or suppository if needed. Bladder: Check PVR x 3. [address] if PVR > 200ml or if any volume is > 500 ml. Sleep: Trazodone provided prn. Pop Ruggiero MD 8/16/2019, 4:04 PM

## 2019-08-16 NOTE — PROGRESS NOTES
Diverticulosis of colon (without mention of hemorrhage), DVT (deep vein thrombosis) in pregnancy ([facility] [address] Hyperlipemia, Hypertension, melanoma, Narcolepsy, GENESIS treated with BiPAP, Pulmonary embolism ([facility] [address] and Sleep apnea. has a past surgical history that includes Abdomen surgery; Colon surgery (10/25/10); pr colostomy (11/3/10); Cystoscopy; other surgical history (6/6/11); Colonoscopy (11/7/2011); colostomy (11/8/11); Prostate surgery; other surgical history (2012); other surgical history; TURP (2012); Cystoscopy (2/6/13); TURP (2/20/13); Cystoscopy (4/3/2013); other surgical history (11/20/2013); joint replacement; Cystocopy (3/19/14); Cystocopy (9/3/14); Cystoscopy (2/25/15); other surgical history (07/22/2015); Cystoscopy (N/A, 1/20/16); Cystoscopy (09/21/2016); bronchoscopy (N/A, 8/14/2019); and bronchoscopy (8/14/2019). Restrictions  Restrictions/Precautions  Restrictions/Precautions: General Precautions, Fall Risk  Position Activity Restriction  Other position/activity restrictions: 3L O2     Vision/Hearing  Vision: Impaired  Vision Exceptions: Wears glasses for reading  Hearing: Exceptions to [facility]  Hearing Exceptions: Hard of hearing/hearing concerns       Subjective  General  Chart Reviewed: Yes  Patient assessed for rehabilitation services?: Yes  Response To Previous Treatment: Not applicable  Family / Caregiver Present: No  Referring Practitioner: Tammie Acosta  Referral Date : 08/15/19  Diagnosis: debility  Follows Commands: Within Functional Limits  General Comment  Comments: RN cleared pt for therapy eval.  Subjective  Subjective: Patient agreeable to therapy eval.  Pain Screening  Patient Currently in Pain: Yes  Pain Assessment  Pain Assessment: 0-10  Pain Level: 9  Pain Type: Chronic pain  Pain Location: Leg  Pain Orientation: Right;Left  Non-Pharmaceutical Pain Intervention(s): Ambulation/Increased Activity; Emotional support;Repositioned  Vital Signs  Patient Currently in Pain: assistance  Supine to Sit: Maximum assistance  Sit to Supine: Minimal assistance  Scooting: Minimal assistance     Transfers  Sit to Stand: Maximum Assistance  Stand to sit: Moderate Assistance  Bed to Chair: Dependent/Total;Minimal assistance;2 Person Assistance  Stand Pivot Transfers: Minimal Assistance;Dependent/Total;2 Person Assistance     Ambulation: No (unsafe at this time)    Wheelchair Activities  Wheelchair Parts Management: Yes  Left Leg Rest Level of Assistance: Dependent/Total  Right Leg Rest Level of Assistance: Dependent/Total  Left Brakes Level of Assistance: Stand by assistance(verbal cues)  Right Brakes Level of Assistance: Stand by Assist    Propulsion: Yes  Propulsion 1  Propulsion: Manual  Level: Level Tile  Method: RUE;LUE  Level of Assistance: Stand by assistance;Minimal assistance  Description/ Details: SBA for negotiating straight in hallways; min A for turns and negotiating doorways  Distance: 165 ft, 70 ft     Balance  Sitting - Static: Good;-  Sitting - Dynamic: Fair;+  Standing - Static: Fair  Standing - Dynamic: Poor;+     Exercises  Gluteal Sets: 10x  Hip Flexion: seated marches: 10x B LE  Knee Long Arc Quad: 10x B LE  Ankle Pumps: 10x B LE     2nd Session  Patient required CGA/min A for all sit<>stand transfers (6 reps) during 2nd session. Patient required VCs for L hand placement on RW during every trial.  He ambulated with CGA in // bars for 3+6 ft.  Patient ambulated using RW and min A for 1 step + 5 steps with decreased step length and height and increased forward flexion of trunk. Static standing balance with hands on RW and VCs to maintain upright posture for 35 sec, 55 sec, and 15 sec.     Plan   Plan  Times per week: 5 out of 7 days  Times per day: Daily  Specific instructions for Next Treatment: Progress ther ex and mobility as tolerated  Current Treatment Recommendations: Strengthening, Transfer Training, Endurance Training, Balance Training, Gait Training, Functional

## 2019-08-16 NOTE — PROGRESS NOTES
term goals  Time Frame for Short term goals: 1 week by 8/22/19  Short term goal 1: Pt will complete functional transfers with min A  Short term goal 2: Pt will complete LB ADLs with mod A or less  Short term goal 3: Pt will complete toileting with mod A or less  Short term goal 4: Pt will tolerate stnading at sink for 2 grooming tasks with CGA, w/o fatigue  Long term goals  Time Frame for Long term goals : 2 weeks by 8/29/19  Long term goal 1: Pt will complete UB/LB ADLs Mod I with AE as needed  Long term goal 2: Pt will complete toileting Mod I   Long term goal 3: Pt will complete all functional transfers mod I   Long term goal 4: Pt will complete tub transfer with TTB Mod I   Long term goal 5: Pt will complete light meal prep/home mgmt tasks mod I  Patient Goals   Patient goals : \"go home\"       Therapy Time   Individual Concurrent Group Co-treatment   Time In 1330     1300   Time Out 1430     1330   Minutes 60     30   Timed Code Treatment Minutes: Liborio Henry OT

## 2019-08-17 NOTE — PROGRESS NOTES
theraband x15, iso hip adduction x15, hamstring curls against green theraband x15; rest breaks between exercises due to fatigue, SpO2 92% on 3L  Sit-stands x5 reps from chair to RW with CGA/SBA, min cues for hand placement; one rest break required. Pt left up in chair with all needs in reach and OT present. Goals  Short term goals  Time Frame for Short term goals: 1 week  Short term goal 1: Patient will perform sit<>supine with CGA. Short term goal 2: Patient will perform sit<>stand transfers with CGA and LRAD. Short term goal 3: Patient will perform bed<>chair transfers with CGA and LRAD. Short term goal 4: Patient will ambulated 25 ft with CGA and LRAD. Short term goal 5: Patient will ascend/descend 3 stairs with L HR and min A. Long term goals  Time Frame for Long term goals : 2 weeks  Long term goal 1: Patient will independently perform sit<>supine. Long term goal 2: Patient will perform sit<>stand transfers with MI and LRAD. Long term goal 3: Patient will ambulate 100 ft with MI and LRAD. Long term goal 4: Patient will ascend/descend 3 stairs with L HR and Supervision. Long term goal 5: Patient will demonstrate ind of seated/supine HEP to maintain functional strength. Patient Goals   Patient goals :  \"To get stronger and go home\"    Plan    Plan  Times per week: 5 out of 7 days  Times per day: Daily  Specific instructions for Next Treatment: Progress ther ex and mobility as tolerated  Current Treatment Recommendations: Strengthening, Transfer Training, Endurance Training, Balance Training, Gait Training, Functional Mobility Training, Safety Education & Training, ROM, Patient/Caregiver Education & Training, Equipment Evaluation, Education, & procurement, Stair training, Home Exercise Program  Safety Devices  Type of devices: (left with OT)     Therapy Time   Individual Concurrent Group Co-treatment   Time In 0930         Time Out 1030         Minutes 60         Timed Code Treatment Minutes: 60

## 2019-08-17 NOTE — PROGRESS NOTES
Occupational Therapy  [facility]  Daily Treatment Note  NAME: Dion Ho  : 10/17/1933  MRN: [id number]    Date of Service: 2019    Discharge Recommendations:  24 hour supervision or assist, Home with Home health OT     Assessment   Performance deficits / Impairments: Decreased functional mobility ; Decreased safe awareness;Decreased balance;Decreased cognition;Decreased ADL status; Decreased endurance;Decreased strength  Assessment:     Session 1: Pt agreeable to therapy and bathing. Pt required Mod A for stand-step transfer to and from TTB. Pt requiring Max A/Total A for bathing at this time seated on TTB 2/2 fatigue with minimal movement as well as decreased ROM. Pt required Total A for LB dressing and Min A for UB dressing this session. Pt continues to demonstrate overall decreased activity tolerance and noted to have SARGENT with O2 remaining between 90-94%. Cont OT POC. Session 2: Pt with urgency to go to restroom on OT arrival. Pt stood to use urinal with Mod A for balance. Pt educated on and demonstrated BUE AROM therex with 2# free weight. Cont OT POC. Prognosis: Good  OT Education: OT Role;Plan of Care;Transfer Training;Energy Conservation; ADL Adaptive Strategies  Patient Education: role of OT, safety, ADLs  REQUIRES OT FOLLOW UP: Yes  Activity Tolerance  Activity Tolerance: Patient limited by fatigue  Activity Tolerance: some self-limiting behavior noted as well  Safety Devices  Safety Devices in place: Yes  Type of devices: Gait belt;Call light within reach; Left in chair;Chair alarm in place       Patient Diagnosis(es): There were no encounter diagnoses.       has a past medical history of Arthritis, BiPAP (biphasic positive airway pressure) dependence, Bladder carcinoma ([facility] [address] CAD (coronary artery disease), COPD (chronic obstructive pulmonary disease) ([facility] [address] Diabetes mellitus ([facility] [address] Diverticulosis of colon (without mention of hemorrhage), DVT (deep vein thrombosis) in

## 2019-08-19 NOTE — PROGRESS NOTES
Physical Therapy  Facility/Department: [facility]  Daily Treatment Note  NAME: Ayla Boyer  : 10/17/1933  MRN: [id number]    Date of Service: 2019    Discharge Recommendations:  24 hour supervision or assist, Home with Home health PT   PT Equipment Recommendations  Equipment Needed: Yes(TBD)    Assessment   Body structures, Functions, Activity limitations: Decreased functional mobility ; Decreased endurance;Decreased strength;Decreased safe awareness;Decreased balance;Decreased ADL status; Decreased high-level IADLs  Assessment: Patient is progressing with his PT goals. Patient's mobility continues to be limited by decreased endurance, decreased strength and impaired dynamic standing balance. Patient will continue to benefit from skilled PT plan of care while patient is in the ARU setting. Gait training provided to maximize endurance and minimize his gait deviations. Therapeutic activities provided to maximize standing balance. Therapeutic exercises to maximize strength. Treatment Diagnosis: Decreased strength, endurance, and functional mobility  Specific instructions for Next Treatment: Progress ther ex and mobility as tolerated  Prognosis: Good  Decision Making: Low Complexity  PT Education: Goals; Functional Mobility Training;PT Role;Transfer Training;Plan of Care;General Safety; Energy Conservation; Family Education;Pressure Relief;Gait Training;Equipment  Barriers to Learning: None  REQUIRES PT FOLLOW UP: Yes  Activity Tolerance  Activity Tolerance: Patient limited by endurance; Patient limited by fatigue     Patient Diagnosis(es): There were no encounter diagnoses.      has a past medical history of Arthritis, BiPAP (biphasic positive airway pressure) dependence, Bladder carcinoma ([facility] [address] CAD (coronary artery disease), COPD (chronic obstructive pulmonary disease) ([facility] [address] Diabetes mellitus ([facility] [address] Diverticulosis of colon (without mention of hemorrhage), DVT (deep vein thrombosis) in pregnancy ([facility] [address] Hyperlipemia, Hypertension, melanoma, Narcolepsy, GENESIS treated with BiPAP, Pulmonary embolism ([facility] [address] and Sleep apnea. has a past surgical history that includes Abdomen surgery; Colon surgery (10/25/10); pr colostomy (11/3/10); Cystoscopy; other surgical history (6/6/11); Colonoscopy (11/7/2011); colostomy (11/8/11); Prostate surgery; other surgical history (2012); other surgical history; TURP (2012); Cystoscopy (2/6/13); TURP (2/20/13); Cystoscopy (4/3/2013); other surgical history (11/20/2013); joint replacement; Cystocopy (3/19/14); Cystocopy (9/3/14); Cystoscopy (2/25/15); other surgical history (07/22/2015); Cystoscopy (N/A, 1/20/16); Cystoscopy (09/21/2016); bronchoscopy (N/A, 8/14/2019); and bronchoscopy (8/14/2019). Restrictions  Restrictions/Precautions  Restrictions/Precautions: General Precautions, Fall Risk  Position Activity Restriction  Other position/activity restrictions: 3L O2  Subjective   General  Chart Reviewed: Yes  Response To Previous Treatment: Not applicable  Family / Caregiver Present: No  Referring Practitioner: Luis Cano  Subjective  Subjective: Pt agreeable to therapy. \"I just want to be able to go home. \" \"My granddaughter is home at night but I am alone during the day. \"  General Comment  Comments: Patient was in chair upon arrival.  Pain Screening  Patient Currently in Pain: No  Vital Signs  Pulse: 95  Patient Currently in Pain: No  Oxygen Therapy  SpO2: 94 %(on exertion)  O2 Device: Nasal cannula  O2 Flow Rate (L/min): 3 L/min       Orientation  Orientation  Overall Orientation Status: Within Normal Limits  Cognition   Cognition  Overall Cognitive Status: Exceptions  Following Commands: Follows one step commands consistently; Follows multistep commands with repitition  Attention Span: Attends with cues to redirect  Memory: Decreased recall of recent events;Decreased short term memory  Safety Judgement: Decreased awareness of need for assistance;Decreased awareness of need for of shortness of breath, chest pain or dizziness. O2 remained at 90% or above on 3L. Stairs/Curb  Stairs?: Yes  Stairs  # Steps : 1(x 2)  Stairs Height: 4\"  Rails: Bilateral  Device: No Device  Assistance: Minimal assistance  Comment: Patient oxygen dropped to 88% on exertion. He did not complain of shortness of breath Quickly returned to 96% on 3L with rest and pursed lip breathing. Wheelchair Activities  Wheelchair Type: Standard  Wheelchair Cushion: Standard  Pressure Relief Type: Lateral lean  Wheelchair Parts Management: Yes  Left Leg Rest Level of Assistance: Dependent/Total  Right Leg Rest Level of Assistance: Dependent/Total  Left Brakes Level of Assistance: Supervision  Right Brakes Level of Assistance: Supervision  Propulsion: Yes  Propulsion 1  Propulsion: Manual  Level: Level Tile  Method: RUE;LUE  Level of Assistance: Minimal assistance  Description/ Details: SBA for negotiating straight in hallways; min A for turns and negotiating doorways  Distance: 60 feet x 2. Total distance limited by fatigue     Balance  Posture: Fair(increased kyphosis)  Sitting - Static: Good  Sitting - Dynamic: Good  Standing - Static: Poor;+  Standing - Dynamic: Poor;+  Exercises  Hip Flexion: seated marches: 3 x 10 B LE  Hip Abduction: 2 x 10  Knee Long Arc Quad: 3 x 10 B LE  Ankle Pumps: 3 x 10   Comments: fatigued quickly. patient needs extensive rest breaks as a result  Other exercises  Other exercises?: Yes(Patient completed approximately 10-15 sit <> stands throughout the entire session)                     Goals  Short term goals  Time Frame for Short term goals: 1 week  Short term goal 1: Patient will perform sit<>supine with CGA. Short term goal 2: Patient will perform sit<>stand transfers with CGA and LRAD. Short term goal 3: Patient will perform bed<>chair transfers with CGA and LRAD. Short term goal 4: Patient will ambulated 25 ft with CGA and LRAD.   Short term goal 5: Patient will ascend/descend 3 stairs

## 2019-08-19 NOTE — PROGRESS NOTES
(2/20/13); Cystoscopy (4/3/2013); other surgical history (11/20/2013); joint replacement; Cystocopy (3/19/14); Cystocopy (9/3/14); Cystoscopy (2/25/15); other surgical history (07/22/2015); Cystoscopy (N/A, 1/20/16); Cystoscopy (09/21/2016); bronchoscopy (N/A, 8/14/2019); and bronchoscopy (8/14/2019). Restrictions  Restrictions/Precautions  Restrictions/Precautions: General Precautions, Fall Risk  Position Activity Restriction  Other position/activity restrictions: 3L O2  Subjective   General  Chart Reviewed: Yes  Patient assessed for rehabilitation services?: Yes  Additional Pertinent Hx: morbid obesity, emphysema, hypercholesterolemia, COPD, B PEs, DVT, CHF, narcolepsy, acute cor pulmonale  Family / Caregiver Present: No  Referring Practitioner: Michael Boone MD  Diagnosis: sepsis 2* to pneumonia  Subjective  Subjective: Pt supine, pleasant and agreeable  General Comment  Comments: RN approved therapy  Pain Assessment  Pain Level: 8  Pain Type: Acute pain;Chronic pain  Pain Location: Leg;Hip  Pain Orientation: Right;Left  Pain Descriptors: Aching  Pre Treatment Pain Screening  Intervention List: Patient able to continue with treatment(issued hot pack to R hip )  Vital Signs  Patient Currently in Pain: Yes   Orientation  Orientation  Overall Orientation Status: Within Functional Limits  Objective    ADL  Feeding: Setup  Grooming: Modified independent (seated at sink)  UE Dressing: Moderate assistance  LE Dressing: Dependent/Total        Balance  Sitting Balance: Supervision  Standing Balance: Minimal assistance(RW)  Standing Balance  Time: ~1 min x4, 10-20 sec x 4, 65 sec x 1  Activity: transfers, standing ADLs, standing activity  Comment: pt with poor standing tolerance     Transfers  Sit to stand: Minimal assistance  Stand to sit: Minimal assistance  Transfer Comments: cues for hand placement/tech                       Cognition  Overall Cognitive Status: Exceptions  Following Commands:  Follows one step commands consistently; Follows multistep commands with repitition  Attention Span: Attends with cues to redirect  Memory: Decreased recall of recent events;Decreased short term memory  Safety Judgement: Decreased awareness of need for assistance;Decreased awareness of need for safety  Problem Solving: Assistance required to generate solutions;Assistance required to identify errors made  Insights: Decreased awareness of deficits  Initiation: Requires cues for some  Sequencing: Requires cues for some                    Type of ROM/Therapeutic Exercise  Type of ROM/Therapeutic Exercise: AROM; Free weights  Comment: 2# weights  Exercises  Scapular Protraction: x15  Scapular Retraction: x15  Shoulder Flexion: x15  Chair Push-ups: x 5 with rest breaks between each  Elbow Flexion: x15  Elbow Extension: x15  Supination: x15  Pronation: x15  Wrist Flexion: x15  Wrist Extension: x15                    Plan   Plan  Times per week: 5/7 days/wk   Current Treatment Recommendations: Strengthening, Endurance Training, Functional Mobility Training, Balance Training, Self-Care / ADL, Patient/Caregiver Education & Training, Equipment Evaluation, Education, & procurement    Goals  Short term goals  Time Frame for Short term goals: 1 week by 8/22/19  Short term goal 1: Pt will complete functional transfers with min A-met 8/19  Short term goal 2: Pt will complete LB ADLs with mod A or less  Short term goal 3: Pt will complete toileting with mod A or less  Short term goal 4: Pt will tolerate standing at sink for 2 grooming tasks with CGA, w/o fatigue  Long term goals  Time Frame for Long term goals : 2 weeks by 8/29/19  Long term goal 1: Pt will complete UB/LB ADLs Mod I with AE as needed  Long term goal 2: Pt will complete toileting Mod I   Long term goal 3: Pt will complete all functional transfers mod I   Long term goal 4: Pt will complete tub transfer with TTB Mod I   Long term goal 5: Pt will complete light meal prep/home mgmt tasks

## 2019-08-20 NOTE — PROGRESS NOTES
08/20/19 1537   Oxygen Therapy/Pulse Ox   O2 Therapy Oxygen   O2 Device Nasal cannula   O2 Flow Rate (L/min) 2 L/min   Resp 18   SpO2 93 %   Pulse Oximeter Device Mode Intermittent   Pulse Oximeter Device Location Left;Finger   Treatment   Treatment Type HHN   $Treatment Type $Inhaled Therapy/Meds   Medications Albuterol/Ipratropium   Is patient on O2?  Y   Pre-Tx Pulse 88   Pre-Tx Resps 18   Breath Sounds Pre-Tx Left Clear;Diminished   Breath Sounds Pre-Tx Right Clear;Diminished   Breath Sounds Post-Tx Left Clear;Diminished   Breath Sounds Post-Tx Right Clear;Diminished   Post-Tx Resps 16   Delivery Source Oxygen   Position Semi-Felipe's   Tx Tolerance Well   Cough/Sputum   Sputum How Obtained None

## 2019-08-20 NOTE — PROGRESS NOTES
non-pulmonary     Assessment and Plan:  Community-acquired pneumonia. Antibiotics completed. All cultures no growth to date.     Pleural effusion status post thoracentesis. Cytology negative. Repeat chest x-ray tomorrow.     Pulmonary embolism status post thrombolysis. Continue anticoagulation.       COPD. Outpatient follow-up with pulmonology. Continue current regimen. Continue supplemental oxygen.     Acute diastolic congestive heart failure. Continue diuretics. Follow daily weights.     Bowels: Schedule colace + senna. Follow bowel movements. Enema or suppository if needed.      Bladder: Check PVR x 3. [address] if PVR > 200ml or if any volume is > 500 ml.      Sleep: Trazodone provided prn. Pop Oconnor MD 8/20/2019, 3:22 PM

## 2019-08-20 NOTE — PROGRESS NOTES
Occupational Therapy  Facility/Department: [facility] ARU  Daily Treatment Note  NAME: Ryan Bryant  : 10/17/1933  MRN: [id number]    Date of Service: 2019    Discharge Recommendations:  24 hour supervision or assist, Home with Home health OT       Assessment   Performance deficits / Impairments: Decreased functional mobility ; Decreased safe awareness;Decreased balance;Decreased cognition;Decreased ADL status; Decreased endurance;Decreased strength  Assessment: Session 1: Pt completes grooming mod I seated at sink, transfers CGA. Pt with fair tolerance to B UE ex with 2# weights, cont to demo poor stnading tolerance. Second session: pt in w/c, cooperative. Pt tolerates 3 sit to stand transfers with CGA/SBA amd tolerates standing 1 min 40 sec with SPO2 93%, 1 min 15 sec with SPO2 of 96%, 1 min 25 sec with 92% SPO2. pt with improved standing toelrance this date. Cont POC. OT Education: OT Role;Plan of Care;Transfer Training;Energy Conservation; ADL Adaptive Strategies  Patient Education: role of OT, safety, ADLs  Activity Tolerance  Activity Tolerance: Patient limited by fatigue  Activity Tolerance: some self-limiting behavior noted as well  Safety Devices  Safety Devices in place: Yes  Type of devices: Gait belt;Call light within reach; Left in chair;Chair alarm in place         Patient Diagnosis(es): There were no encounter diagnoses. has a past medical history of Arthritis, BiPAP (biphasic positive airway pressure) dependence, Bladder carcinoma ([address] CAD (coronary artery disease), COPD (chronic obstructive pulmonary disease) ([address] Diabetes mellitus ([address] Diverticulosis of colon (without mention of hemorrhage), DVT (deep vein thrombosis) in pregnancy ([address] Hyperlipemia, Hypertension, melanoma, Narcolepsy, GENESIS treated with BiPAP, Pulmonary embolism ([address] and Sleep apnea. has a past surgical history that includes Abdomen surgery; Colon surgery (10/25/10); pr colostomy (11/3/10);  Cystoscopy; other

## 2019-08-21 NOTE — PROGRESS NOTES
(10/25/10); pr colostomy (11/3/10); Cystoscopy; other surgical history (6/6/11); Colonoscopy (11/7/2011); colostomy (11/8/11); Prostate surgery; other surgical history (2012); other surgical history; TURP (2012); Cystoscopy (2/6/13); TURP (2/20/13); Cystoscopy (4/3/2013); other surgical history (11/20/2013); joint replacement; Cystocopy (3/19/14); Cystocopy (9/3/14); Cystoscopy (2/25/15); other surgical history (07/22/2015); Cystoscopy (N/A, 1/20/16); Cystoscopy (09/21/2016); bronchoscopy (N/A, 8/14/2019); and bronchoscopy (8/14/2019).     Restrictions  Restrictions/Precautions  Restrictions/Precautions: General Precautions, Fall Risk  Position Activity Restriction  Other position/activity restrictions: 2L O2  Subjective   General  Chart Reviewed: Yes  Patient assessed for rehabilitation services?: Yes  Additional Pertinent Hx: morbid obesity, emphysema, hypercholesterolemia, COPD, B PEs, DVT, CHF, narcolepsy, acute cor pulmonale  Family / Caregiver Present: No  Referring Practitioner: Gatito Oviedo MD  Diagnosis: sepsis 2* to pneumonia  Subjective  Subjective: pt in w/c, pleasant and agreeable  General Comment  Comments: RN approved therapy  Pain Assessment  Pain Level: 5  Pain Type: Chronic pain  Pain Location: Hip  Pain Descriptors: Aching  Vital Signs  Patient Currently in Pain: Yes   Orientation  Orientation  Overall Orientation Status: Within Functional Limits  Objective    ADL  Feeding: Setup  Grooming: Modified independent (seated at sink)  Toileting: Dependent/Total(pericare and clothing mgmt up/down, reports having toileting aide at home, declines toileting tongs)        Balance  Sitting Balance: Supervision  Standing Balance: Stand by assistance  Standing Balance  Time: 2 min, 1 min 10 sec, 1 min 15 sec  Activity: standing balance activity  Comment: pt with poor standing tolerance  Functional Mobility  Functional - Mobility Device: Rolling Walker  Activity: Other  Assist Level: Contact guard

## 2019-08-22 NOTE — PROGRESS NOTES
Physical Therapy  [facility]  Daily Treatment Note  NAME: Vandana Amador  : 10/17/1933  MRN: [id number]    Date of Service: 2019    Discharge Recommendations:  24 hour supervision or assist, Home with Home health PT   PT Equipment Recommendations  Equipment Needed: No    Assessment    Patient presented with moderate SOB post walking and step climbing today. Patient still needs one rest break with wheelchair follow up to walk from room to gym which is 50 feet away. Patient recovered to baseline parameters within 5 min today. Body structures, Functions, Activity limitations: Decreased functional mobility ; Decreased endurance;Decreased strength;Decreased safe awareness;Decreased balance;Decreased ADL status; Decreased high-level IADLs  Treatment Diagnosis: Decreased strength, endurance, and functional mobility  Prognosis: Good  Decision Making: Low Complexity  PT Education: Goals; Functional Mobility Training;PT Role;Transfer Training;Plan of Care;General Safety; Energy Conservation; Family Education;Pressure Relief;Gait Training;Equipment  Patient Education: Patient was also educated about activity pacing and waiting for therapists to lock the wheelchair prior to sitting down during gait training. Barriers to Learning: None  REQUIRES PT FOLLOW UP: Yes  Activity Tolerance  Activity Tolerance: Patient Tolerated treatment well;Patient limited by endurance  Activity Tolerance: Patient had oxygen desaturation ost stepping activity with 6\" high stepper using parallel bars. Patient had SpO2 of 88% post activity. Patient reovered to 96% SpO2 within 5 min. Patient also had moderate SOB post walking and needed frequent breaks to walk. Patient Diagnosis(es): There were no encounter diagnoses.      has a past medical history of Arthritis, BiPAP (biphasic positive airway pressure) dependence, Bladder carcinoma ([facility] [address] CAD (coronary artery disease), COPD (chronic obstructive pulmonary disease) ([facility] [address] Diabetes mellitus ([facility] [address] Diverticulosis of colon (without mention of hemorrhage), DVT (deep vein thrombosis) in pregnancy ([facility] [address] Hyperlipemia, Hypertension, melanoma, Narcolepsy, GENESIS treated with BiPAP, Pulmonary embolism ([facility] [address] and Sleep apnea. has a past surgical history that includes Abdomen surgery; Colon surgery (10/25/10); pr colostomy (11/3/10); Cystoscopy; other surgical history (6/6/11); Colonoscopy (11/7/2011); colostomy (11/8/11); Prostate surgery; other surgical history (2012); other surgical history; TURP (2012); Cystoscopy (2/6/13); TURP (2/20/13); Cystoscopy (4/3/2013); other surgical history (11/20/2013); joint replacement; Cystocopy (3/19/14); Cystocopy (9/3/14); Cystoscopy (2/25/15); other surgical history (07/22/2015); Cystoscopy (N/A, 1/20/16); Cystoscopy (09/21/2016); bronchoscopy (N/A, 8/14/2019); and bronchoscopy (8/14/2019). Restrictions  Restrictions/Precautions  Restrictions/Precautions: General Precautions, Fall Risk  Position Activity Restriction  Other position/activity restrictions: 2L O2     Subjective   General  Chart Reviewed: Yes  Response To Previous Treatment: Patient reporting fatigue but able to participate. Family / Caregiver Present: No  Referring Practitioner: Yara Almonte  Subjective  Subjective: Pt agreeable to therapy. General Comment  Comments: Patient was in chair upon arrival.  Pain Screening  Patient Currently in Pain: Yes  Pain Assessment  Pain Assessment: 0-10  Pain Level: 5  Pain Type: Chronic pain  Pain Location: Hip  Pain Orientation: Right  Pain Descriptors: Aching  Pain Frequency: Intermittent(sitting for prolonged time)  Pain Onset: On-going  Clinical Progression: Gradually improving  Functional Pain Assessment: Prevents or interferes some active activities and ADLs  Non-Pharmaceutical Pain Intervention(s): Ambulation/Increased Activity;Repositioned; Therapeutic presence  Response to Pain Intervention: Patient Satisfied  Vital Signs  Patient

## 2019-08-22 NOTE — CARE COORDINATION
Writer met with patient in room regarding care conference and CECILIA on 8/29/19. Patient in agreement. Discussed use of O2, current team concerns and ongoing monitoring of his O2 needs. Has DME. Phoned grand daughter, Iliana Lowery regarding the above. She stated that she would be here to pick him up on 8/29/2019 \"after lunch\".   DICKSON Gonzalez

## 2019-08-22 NOTE — PROGRESS NOTES
Occupational Therapy  Facility/Department: [facility]  Daily Treatment Note  NAME: Seb Reid  : 10/17/1933  MRN: [id number]    Date of Service: 2019    Discharge Recommendations:  24 hour supervision or assist, Home with Home health OT       Assessment   Performance deficits / Impairments: Decreased functional mobility ; Decreased safe awareness;Decreased balance;Decreased cognition;Decreased ADL status; Decreased endurance;Decreased strength  Assessment: session1: pt completes grooming mod I seated at sink with increased time, transfers SBA with limited standing tolerance , agreeable to shower in next session. Second session: Pt reports need to use restroom unregently, transfers SBA, total A for clothing mgmt up/down and pericare after BM. Pt SOB with min exertion from toielting and declines shower will attempt next date. Pt with limited standing tolerance throughout sessions, SPO2 drops to 88% on 2L with some activity, other times drops to 90% on 2L, with ~3 min recovery time to starting 94-96% on 2L. Pt fatigues easily, cont POC. Safety Devices  Safety Devices in place: Yes  Type of devices: Gait belt;Call light within reach; Left in chair;Chair alarm in place(left with PT after 2nd tx)         Patient Diagnosis(es): There were no encounter diagnoses. has a past medical history of Arthritis, BiPAP (biphasic positive airway pressure) dependence, Bladder carcinoma ([address] CAD (coronary artery disease), COPD (chronic obstructive pulmonary disease) ([address] Diabetes mellitus ([address] Diverticulosis of colon (without mention of hemorrhage), DVT (deep vein thrombosis) in pregnancy ([address] Hyperlipemia, Hypertension, melanoma, Narcolepsy, GENESIS treated with BiPAP, Pulmonary embolism ([address] and Sleep apnea. has a past surgical history that includes Abdomen surgery; Colon surgery (10/25/10); pr colostomy (11/3/10); Cystoscopy; other surgical history (11);  Colonoscopy (2011); colostomy (11);

## 2019-08-23 NOTE — PROGRESS NOTES
1030         Time Out 1130         Minutes 60         Timed Code Treatment Minutes: 60 Minutes    Therapy Time   Individual Concurrent Group Co-treatment   Time In 1230         Time Out 1300         Minutes 30         Timed Code Treatment Minutes: [address], OT

## 2019-08-23 NOTE — PROGRESS NOTES
Physical Therapy  Facility/Department: [facility]  Daily Treatment Note  NAME: Domenico Garcia  : 10/17/1933  MRN: [id number]    Date of Service: 2019    Discharge Recommendations:  24 hour supervision or assist, Home with Home health PT   PT Equipment Recommendations  Equipment Needed: No    Assessment    Patient reported moderate fatigue during the sessions today and needed more frequent rest breaks after each therapeutic activity. Patient was able to walk small distance (10 feet) ambulation thrice today to avoid excessive SOB and activity pacing. Patient needed 2L/min supplementary oxygen via nasal cannula today throughout the session. RN and physician were notified regarding patient's response to session. Body structures, Functions, Activity limitations: Decreased functional mobility ; Decreased endurance;Decreased strength;Decreased safe awareness;Decreased balance;Decreased ADL status; Decreased high-level IADLs  Treatment Diagnosis: Decreased strength, endurance, and functional mobility  Prognosis: Good  Decision Making: Low Complexity  PT Education: Goals; Functional Mobility Training;PT Role;Transfer Training;Plan of Care;General Safety; Energy Conservation; Family Education;Pressure Relief;Gait Training;Equipment  Patient Education: Patient was also educated about activity pacing and waiting for therapists to lock the wheelchair prior to sitting down during gait training. Patient still needs reinforcement regarding pacing the activity and avoiding sitting in the wheelchair suddenly while walking needing wheelchair follow up. Barriers to Learning: None  REQUIRES PT FOLLOW UP: Yes  Activity Tolerance  Activity Tolerance: Patient Tolerated treatment well;Patient limited by endurance  Activity Tolerance: Patient was on room air today.  Patient needed 1-2 L/min supplementary oxygen during the session since he was desaturating to 83% with room air oxygen while performing functional transfers and arrival.  Pain Screening  Patient Currently in Pain: Yes  Pain Assessment  Pain Assessment: 0-10  Pain Level: 6  Patient's Stated Pain Goal: 3  Pain Type: Chronic pain  Pain Location: Hip  Pain Orientation: Right  Pain Descriptors: Aching  Pain Frequency: Intermittent  Pain Onset: On-going  Clinical Progression: Gradually improving  Functional Pain Assessment: Prevents or interferes some active activities and ADLs  Non-Pharmaceutical Pain Intervention(s): Therapeutic presence;Repositioned; Ambulation/Increased Activity  Response to Pain Intervention: Patient Satisfied  Vital Signs  Patient Currently in Pain: Yes       Orientation  Orientation  Overall Orientation Status: Within Normal Limits     Cognition   Cognition  Overall Cognitive Status: WFL     Objective   Transfers  Sit to Stand: Stand by assistance(using RW)  Stand to sit: Stand by assistance(using RW)  Bed to Chair: Stand by assistance  Ambulation  Ambulation?: Yes  More Ambulation?: Yes  Ambulation 1  Surface: level tile  Device: Rolling Walker  Other Apparatus: O2  Assistance: Stand by assistance;2 Person assistance  Quality of Gait: Patient walked with forward trunk lean, decreased step length and height, increased LM. Patient needed cueing for sequencing with RW, and performing controlled descent while sitting down. Patient needed close wheelchair folllow up due to tendency of stting down abruptly due to SOB. NoLOB and buckling at the knees noted. Gait Deviations: Slow Jenny;Decreased step length;Decreased step height;Decreased head and trunk rotation;Decreased arm swing  Distance: 12 feet + 12 feet + 10 feet  Comments: Patient showed SpO2 between 89-94% during walking. Patient recovered to 94% within 2min after completion of the activity. Exercises  Quad Sets: B/L 10 reps x 2 sets  Hip Flexion: seated marches: 10 reps x 2 sets BLE with 2lbs on B/L LEs  Knee Long Arc Quad: seated with 2lbs on the BLE: 10 reps x 2 sets BLE  Ankle Pumps: 40 reps

## 2019-08-24 NOTE — PLAN OF CARE
Patient alert and oriented x4. Patient able to use pain rating scale 0/10 adequately without problems. Pain medications explained along with frequency and when to notify the nurse with  possible side effects. Verbalizes understanding. Call light within reach. Resting quietly in chair. Denies needs at present.

## 2019-08-26 NOTE — PROGRESS NOTES
or greater during ARU stay    · Monitoring: Meal Intake, Weight, Pertinent Labs      Electronically signed by Teresa Morrison RD, LD on 8/26/19 at 1:46 PM    Contact Number: Office: [phone]; [address] [place] [address] Road: [phone]

## 2019-08-26 NOTE — PROGRESS NOTES
Joon Thomas  8/26/2019  [id number]    Chief Complaint: COPD (chronic obstructive pulmonary disease) ([facility] [address]    Subjective:   No issues overnight. Seen in the gym today working with therapy no new complaints. ROS: no nausea, vomiting, fever, chills, chest pain, shortness of breath  Objective:  Patient Vitals for the past 24 hrs:   BP Temp Temp src Pulse Resp SpO2 Weight   08/26/19 0900 (!) 179/73 98.1 °F (36.7 °C) Oral 94 20 93 % --   08/26/19 0827 -- -- -- 94 -- 95 % --   08/26/19 0630 -- -- -- -- -- -- 282 lb (127.9 kg)   08/25/19 2045 138/78 97.5 °F (36.4 °C) Oral 95 17 95 % --   08/25/19 1953 -- -- -- -- 16 -- --   08/25/19 1525 -- -- -- -- 16 -- --     Gen: No distress, pleasant. HEENT: Normocephalic, atraumatic. CV: Regular rate and rhythm. Resp: No respiratory distress. Abd: Soft, nontender   Ext: No edema. Neuro: Alert, oriented, appropriately interactive. Wt Readings from Last 3 Encounters:   08/26/19 282 lb (127.9 kg)   08/15/19 285 lb 4.4 oz (129.4 kg)   02/07/19 296 lb (134.3 kg)       Laboratory data:   Lab Results   Component Value Date    WBC 8.2 08/26/2019    HGB 13.7 08/26/2019    HCT 41.0 08/26/2019    MCV 92.6 08/26/2019     08/26/2019       Lab Results   Component Value Date     08/26/2019    K 3.7 08/26/2019    CL 95 08/26/2019    CO2 33 08/26/2019    BUN 14 08/26/2019    CREATININE <0.5 08/26/2019    GLUCOSE 161 08/26/2019    CALCIUM 9.2 08/26/2019        Therapy progress:  PT  Position Activity Restriction  Other position/activity restrictions: 2L O2  Objective     Sit to Stand: Supervision  Stand to sit: Supervision  Bed to Chair: Supervision(with RW)  Device: Rolling Walker  Other Apparatus: O2, Wheelchair follow(wheelchair follow because patient frequently becomes short of breath on exertion. )  Assistance: Supervision  Distance: 15 feet, 15 feet, 15 feet, 25 feet, 20 feet, 20 feet.    OT  PT Equipment Recommendations  Equipment Needed: No  Toilet - Technique: Stand pivot, Ambulating  Equipment Used: Standard toilet  Toilet Transfers Comments: pt amb to restroom, gets in w/c, then needs to sit on toilet 2 more times during session  Assessment        SLP                Body mass index is 44.17 kg/m². Rehabilitation Diagnosis:  16.0, Debility (non-cardiac, non-pulmonary     Assessment and Plan:  Community-acquired pneumonia. Antibiotics completed. All cultures no growth to date.     Pleural effusion status post thoracentesis. Cytology negative. Repeat CXR with no reaccumulation      Pulmonary embolism status post thrombolysis. Continue anticoagulation.       COPD. Outpatient follow-up with pulmonology. Continue current regimen. Continue supplemental oxygen.     Acute diastolic congestive heart failure. Continue diuretics. Follow daily weights.     Bowels: Schedule colace + senna. Follow bowel movements. Enema or suppository if needed.      Bladder: Check PVR x 3. [address] if PVR > 200ml or if any volume is > 500 ml.      Sleep: Trazodone provided prn. DME: Owns  CECILIA: 8/29 home with home health    Jaimie Driscoll MD 8/26/2019, 11:40 AM

## 2019-08-26 NOTE — PROGRESS NOTES
pressure) dependence, Bladder carcinoma ([facility] [address] CAD (coronary artery disease), COPD (chronic obstructive pulmonary disease) ([facility] [address] Diabetes mellitus ([facility] [address] Diverticulosis of colon (without mention of hemorrhage), DVT (deep vein thrombosis) in pregnancy ([facility] [address] Hyperlipemia, Hypertension, melanoma, Narcolepsy, GENESIS treated with BiPAP, Pulmonary embolism ([facility] [address] and Sleep apnea. has a past surgical history that includes Abdomen surgery; Colon surgery (10/25/10); pr colostomy (11/3/10); Cystoscopy; other surgical history (6/6/11); Colonoscopy (11/7/2011); colostomy (11/8/11); Prostate surgery; other surgical history (2012); other surgical history; TURP (2012); Cystoscopy (2/6/13); TURP (2/20/13); Cystoscopy (4/3/2013); other surgical history (11/20/2013); joint replacement; Cystocopy (3/19/14); Cystocopy (9/3/14); Cystoscopy (2/25/15); other surgical history (07/22/2015); Cystoscopy (N/A, 1/20/16); Cystoscopy (09/21/2016); bronchoscopy (N/A, 8/14/2019); and bronchoscopy (8/14/2019). Restrictions  Restrictions/Precautions  Restrictions/Precautions: General Precautions, Fall Risk  Position Activity Restriction  Other position/activity restrictions: 2L O2  Subjective   General  Chart Reviewed: Yes  Response To Previous Treatment: Patient reporting fatigue but able to participate. Family / Caregiver Present: No  Referring Practitioner: Veda Guallpa  Subjective  Subjective: Pt agreeable to therapy.   General Comment  Comments: Patient was in chair upon arrival.  Pain Screening  Patient Currently in Pain: Yes  Pain Assessment  Pain Assessment: 0-10  Pain Level: 7  Pain Type: Chronic pain  Pain Location: Back;Leg  Pain Orientation: Left;Right  Functional Pain Assessment: Prevents or interferes some active activities and ADLs  Vital Signs  Pulse: 94  Patient Currently in Pain: Yes  Oxygen Therapy  SpO2: 95 %  O2 Flow Rate (L/min): 3 L/min       Orientation  Orientation  Overall Orientation Status: Within Normal

## 2019-08-26 NOTE — PROGRESS NOTES
Physical Therapy  Facility/Department: [facility]  Daily Treatment Note  NAME: Phong Allen  : 10/17/1933  MRN: [id number]    Date of Service: 2019    Discharge Recommendations:  24 hour supervision or assist, Home with Home health PT   PT Equipment Recommendations  Equipment Needed: No    Assessment   Body structures, Functions, Activity limitations: Decreased functional mobility ; Decreased endurance;Decreased strength;Decreased safe awareness;Decreased balance;Decreased ADL status; Decreased high-level IADLs; Increased Pain  Assessment: pt endorsing fatigue at start of session, however agreeable with encouragement. standing balance activities completed with emphasis on upright extended trunk posture and PLB to promote increased standing tolerance. pt declining to ambulate this session 2* fatigue, however completes w/c mobility with supv (rqeuires cues for environment awareness). continue to progress mobility as tolerated and increase endurance. Treatment Diagnosis: Decreased strength, endurance, and functional mobility  Specific instructions for Next Treatment: Progress ther ex and mobility as tolerated  Prognosis: Good  Decision Making: Low Complexity  PT Education: Goals; Functional Mobility Training;PT Role;Transfer Training;Plan of Care;General Safety; Energy Conservation; Family Education;Pressure Relief;Gait Training;Equipment  Barriers to Learning: None  REQUIRES PT FOLLOW UP: Yes  Activity Tolerance  Activity Tolerance: Patient limited by endurance; Patient limited by pain; Patient limited by fatigue  Activity Tolerance: pt on 2 L o2, Spo2 remained > 94-95% with activity, HR ranging from 100-102 bpm wtih activity. pt SOB, requires cues to initiate PLB properly. Patient Diagnosis(es): There were no encounter diagnoses.      has a past medical history of Arthritis, BiPAP (biphasic positive airway pressure) dependence, Bladder carcinoma ([facility] [address] CAD (coronary artery disease), COPD (chronic obstructive pulmonary disease) ([facility] [address] Diabetes mellitus ([facility] [address] Diverticulosis of colon (without mention of hemorrhage), DVT (deep vein thrombosis) in pregnancy ([facility] [address] Hyperlipemia, Hypertension, melanoma, Narcolepsy, GENESIS treated with BiPAP, Pulmonary embolism ([facility] [address] and Sleep apnea. has a past surgical history that includes Abdomen surgery; Colon surgery (10/25/10); pr colostomy (11/3/10); Cystoscopy; other surgical history (6/6/11); Colonoscopy (11/7/2011); colostomy (11/8/11); Prostate surgery; other surgical history (2012); other surgical history; TURP (2012); Cystoscopy (2/6/13); TURP (2/20/13); Cystoscopy (4/3/2013); other surgical history (11/20/2013); joint replacement; Cystocopy (3/19/14); Cystocopy (9/3/14); Cystoscopy (2/25/15); other surgical history (07/22/2015); Cystoscopy (N/A, 1/20/16); Cystoscopy (09/21/2016); bronchoscopy (N/A, 8/14/2019); and bronchoscopy (8/14/2019). Restrictions  Restrictions/Precautions  Restrictions/Precautions: General Precautions, Fall Risk  Position Activity Restriction  Other position/activity restrictions: 2L O2  Subjective   General  Chart Reviewed: Yes  Response To Previous Treatment: Patient reporting fatigue but able to participate. Family / Caregiver Present: No  Referring Practitioner: Fidelia Crews  Subjective  Subjective: Pt agreeable to therapy. General Comment  Comments: pt found with OT in gym  Pain Screening  Patient Currently in Pain: Yes  Pain Assessment  Pain Assessment: 0-10  Pain Level: 8  Pain Type: Chronic pain  Pain Location: Back  Pain Descriptors: Aching  Non-Pharmaceutical Pain Intervention(s): Repositioned; Therapeutic presence  Response to Pain Intervention: Patient Satisfied  Vital Signs  Patient Currently in Pain: Yes       Orientation  Orientation  Overall Orientation Status: Within Normal Limits  Cognition      Objective      Transfers  Sit to Stand: Supervision  Stand to sit: Supervision  Bed to Chair: Supervision  Stand Pivot

## 2019-08-27 NOTE — PATIENT CARE CONFERENCE
feet OR requires two or more people OR patient performs < 25% of locomotion effort  Distance Walked: 29'  Wheel Chair: 4 - Contact Guard/Minimal Assistance Requires up to [employer] or Minimal Assistance to operate wheelchair at least 150 feet  Distance Traveled in [address]: 165  Stairs: 1- Total Assistance perfoms less than 25% of the effort, or requirs the assistance of two people, or goes up and down fewer than 4 stairs    PT Equipment Recommendations  Equipment Needed: No    Assessment: Pt continues to require supervision for transfers with RW. Pt able to tolerate increased gait distance this sesssion with use of RW and SBA. Pt fatigues quickly and requires rest breaks between activities. Occupational therapy observed barriers to dc:    Baseline: lives alone with family staying at night, was ind with ADLs and mobility short distances              Current level: total A LB ADLs and toileting, transfers CGA/SPV.  Reports having toileting aide at home, refuses toileting tongs here              Barriers to DC: assist level, poor endurance/standing tolerance      Needs in order to achieve dc home/next level of care: 24 hr assist, less assist for ADL tasks     Occupational Therapy interventions:  Current Treatment Recommendations: Strengthening, Endurance Training, Functional Mobility Training, Balance Training, Self-Care / ADL, Patient/Caregiver Education & Training, Equipment Evaluation, Education, & procurement      OCCUPATIONAL THERAPY  FIMs last conference  Eatin - Patient feeds self  Groomin - Able to perform 3-4 tasks with touching help  Bathin - Able to bathe 2 or less areas/total assist  Dressing-Upper: 4 - Requires assist with buttons/zippers only and/or requires assist with one arm only  Dressing-Lower: 1 - Total assist with lower body dressing  Toiletin - Able to perform 1 task only (e.g. hygiene)  Toilet Transfer: 3 - Requires 25-49% assist getting off toilet  Tub Transfer: 0 -

## 2019-08-27 NOTE — DISCHARGE INSTR - COC
JOINT REPLACEMENT      bilateral knee    OTHER SURGICAL HISTORY  6/6/11    TURP    OTHER SURGICAL HISTORY  2012    cystoscopy with urethral dilation    OTHER SURGICAL HISTORY      Colostomy Reversal    OTHER SURGICAL HISTORY  11/20/2013    cystoscopy, TUR bladder tumor with Holmium laser    OTHER SURGICAL HISTORY  07/22/2015    cystoscopy, urethral dilatation    MA COLOSTOMY  11/3/10    bowel resect w/colostomy    PROSTATE SURGERY      TURP  2012    TURP  2/20/13       Immunization History:   Immunization History   Administered Date(s) Administered    Influenza 10/28/2011, 11/01/2013    Influenza Virus Vaccine 02/20/2012, 10/08/2014    Influenza Whole 09/20/2015    Influenza, High Dose (Fluzone 65 yrs and older) 10/09/2018    Influenza, Tayler Embs, 3 Years and older, IM (Fluzone, Afluria) 11/02/2016, 09/14/2017    Pneumococcal Conjugate 13-valent (Mary Skates) 12/30/2015, 11/02/2016    Pneumococcal Polysaccharide ([id number]) 10/27/2011       Active Problems:  Patient Active Problem List   Diagnosis Code    Bladder carcinoma ([facility] C67.9    History of melanoma Z85.820    Arthritis M19.90    Hypercholesteremia E78.00    Gastroesophageal reflux disease without esophagitis K21.9    Narcolepsy G47.419    History of diverticulitis of colon Z87.19    Benign prostatic hyperplasia with urinary obstruction N40.1, N13.8    Smokeless tobacco use Z72.0    Type 2 diabetes mellitus without complication, without long-term current use of insulin ([facility]) E11.9    Essential hypertension I10    Panlobular emphysema ([facility] J43.1    Coronary artery disease involving native coronary artery of native heart without angina pectoris I25.10    GENESIS on CPAP G47.33, Z99.89    Morbid obesity due to excess calories ([facility]) E66.01    History of Bell's palsy Z86.69    CAP (community acquired pneumonia) J18.9    Acute respiratory failure with hypoxemia ([facility]) J96.01    History of tobacco abuse Z87.891    PNA (pneumonia) J18.9  DM (diabetes mellitus), secondary uncontrolled ([facility]) E13.65    Elevated lactic acid level R79.89    Acute saddle pulmonary embolism with acute cor pulmonale ([facility]) I26.02    Pleural effusion J90    Atelectasis J98.11    COPD (chronic obstructive pulmonary disease) ([facility]) J44.9       Isolation/Infection:   Isolation          No Isolation            Nurse Assessment:  Last Vital Signs: BP (!) 179/92   Pulse 89   Temp 98.2 °F (36.8 °C) (Oral)   Resp 18   Ht 5' 7\" (1.702 m)   Wt 282 lb (127.9 kg)   SpO2 98%   BMI 44.17 kg/m²     Last documented pain score (0-10 scale): Pain Level: 7  Last Weight:   Wt Readings from Last 1 Encounters:   08/26/19 282 lb (127.9 kg)     Mental Status:  oriented    IV Access:  - None    Nursing Mobility/ADLs:  Walking   Assisted  Transfer  Assisted  Bathing  Assisted  Dressing  Assisted  Toileting  Assisted  Feeding  Independent  Med Admin  Assisted  Med Delivery   whole    Wound Care Documentation and Therapy:        Elimination:  Continence:   · Bowel: Yes  · Bladder: Yes  Urinary Catheter: None   Colostomy/Ileostomy/Ileal Conduit: No       Date of Last BM: 08/29/2019    Intake/Output Summary (Last 24 hours) at 8/27/2019 1134  Last data filed at 8/27/2019 1024  Gross per 24 hour   Intake 720 ml   Output 2025 ml   Net -1305 ml     I/O last 3 completed shifts: In: 720 [P.O.:720]  Out: [place] 9938 [EVGAH:[id number]]    Safety Concerns: At Risk for Falls    Impairments/Disabilities:      None    Nutrition Therapy:  Current Nutrition Therapy:   - Oral Diet:  Carb Control 5 carbs/meal (2000kcals/day)    Routes of Feeding: Oral  Liquids: Thin Liquids  Daily Fluid Restriction: no  Last Modified Barium Swallow with Video (Video Swallowing Test): not done    Treatments at the Time of Hospital Discharge:   Respiratory Treatments: Inhaler  Oxygen Therapy:  is on oxygen at 3 L/min per nasal cannula.   Ventilator:   Bipap at night    Rehab Therapies: Physical Therapy and Occupational Therapy  Weight Bearing Status/Restrictions: No weight bearing restirctions  Other Medical Equipment (for information only, NOT a DME order):  walker  Other Treatments: Ace wrap bilateral lower legs    Patient's personal belongings (please select all that are sent with patient):  None    RN SIGNATURE:  Electronically signed by Navneet Erickson RN on 8/29/19 at 11:57 AM    CASE MANAGEMENT/SOCIAL WORK SECTION    Inpatient Status Date: 8/15/2019    Readmission Risk Assessment Score: 24.75      Discharging to Agency:   · [facility]  · Address: [place] [address] and [address] [place] [address]  · Phone: [phone]  · Fax: [phone]    / signature: RONEY Maier, GILLW      PHYSICIAN SECTION    Prognosis: Fair    Condition at Discharge: Stable    Rehab Potential (if transferring to Rehab): Fair    Recommended Labs or Other Treatments After Discharge: Home PT/OT, follow up with PCP, follow up with pulmonology    Physician Certification: I certify the above information and transfer of Rito Aldrich  is necessary for the continuing treatment of the diagnosis listed and that he requires [address] for less 30 days.      Update Admission H&P: No change in H&P    PHYSICIAN SIGNATURE:  Electronically signed by Savana Vizcaino MD on 8/28/19 at 2:55 PM

## 2019-08-27 NOTE — PROGRESS NOTES
Ambulation?: No  Ambulation 1  Surface: level tile  Device: Rolling Walker  Other Apparatus: O2(3L)  Assistance: Supervision  Quality of Gait: forward flexed posture, decreased gait velocity. Gait Deviations: Slow Jenny;Decreased step length;Decreased step height;Decreased head and trunk rotation;Decreased arm swing  Distance: 30 feet  Comments: O2 90-95% on 3L while ambulating. No complaints of chest pain or dizziness. Patient's RN aware of patient's shortness of breath. Educated patient on pursed lip breathing. Stairs/Curb  Stairs?: Yes  Stairs  # Steps : 3(also completed 2 steps)  Stairs Height: 4\"  Rails: Left ascending  Device: No Device  Assistance: Contact guard assistance  Comment: 3 minute rest break between stair attempts. Oxygen saturation decreased to 89% on 3L on exertion. Cueing for safe foot placement. Contact guard assistance applied because patient abruptly sat down in wheelchair with poor eccentric control of his hip extensors. Wheelchair Activities  Wheelchair Type: Standard  Wheelchair Cushion: Standard  Pressure Relief Type: Lateral lean  Wheelchair Parts Management: Yes  Left Leg Rest Level of Assistance: Dependent/Total  Right Leg Rest Level of Assistance: Dependent/Total  Left Brakes Level of Assistance: Modified independent(extra time needed)  Right Brakes Level of Assistance: Modified independent  Propulsion: Yes  Propulsion 1  Propulsion: Manual  Level: Level Tile  Method: MARÍA;SY  Level of Assistance: Supervision  Description/ Details: supervision for both turns and propulsion in hallways  Distance: 60 feet     Balance  Posture: Fair  Sitting - Static: Good  Sitting - Dynamic: Good  Standing - Static: Fair  Standing - Dynamic: Fair  Exercises  Hip Flexion: 3 x 10  Knee Long Arc Quad: 3 x 10  Ankle Pumps: 3 x 10                     Goals  Short term goals  Time Frame for Short term goals: 1 week  Short term goal 1: Patient will perform sit<>supine with CGA.  Goal met 8/26/19  Short term goal 2: Patient will perform sit<>stand transfers with CGA and LRAD. Goal met 8/26/19  Short term goal 3: Patient will perform bed<>chair transfers with CGA and LRAD. Goal met 8/26/19  Short term goal 4: Patient will ambulated 25 ft with CGA and LRAD. Goal met 8/26/19  Short term goal 5: Patient will ascend/descend 3 stairs with L HR and min A. Progressing- GOAL MET 8/27  Long term goals  Time Frame for Long term goals : 2 weeks  Long term goal 1: Patient will independently perform sit<>supine. Long term goal 2: Patient will perform sit<>stand transfers with MI and LRAD. Long term goal 3: Patient will ambulate 100 ft with MI and LRAD. Long term goal 4: Patient will ascend/descend 3 stairs with L HR and Supervision. Long term goal 5: Patient will demonstrate ind of seated/supine HEP to maintain functional strength. Patient Goals   Patient goals : \"I just want to do some walking today. \"    Plan    Plan  Times per week: 5 out of 7 days  Times per day: Daily  Specific instructions for Next Treatment: Progress ther ex and mobility as tolerated  Current Treatment Recommendations: Strengthening, Transfer Training, Endurance Training, Balance Training, Gait Training, Functional Mobility Training, Safety Education & Training, ROM, Patient/Caregiver Education & Training, Equipment Evaluation, Education, & procurement, Stair training, Home Exercise Program  Safety Devices  Type of devices: Gait belt, Chair alarm in place, Left in chair, Call light within reach, Patient at risk for falls, All fall risk precautions in place, Nurse notified  Restraints  Initially in place: No     Therapy Time   Individual Concurrent Group Co-treatment   Time In 1400         Time Out 1430         Minutes 30         Timed Code Treatment Minutes: 1200 [facility], PT

## 2019-08-27 NOTE — PROGRESS NOTES
history (11/20/2013); joint replacement; Cystocopy (3/19/14); Cystocopy (9/3/14); Cystoscopy (2/25/15); other surgical history (07/22/2015); Cystoscopy (N/A, 1/20/16); Cystoscopy (09/21/2016); bronchoscopy (N/A, 8/14/2019); and bronchoscopy (8/14/2019). Restrictions  Restrictions/Precautions  Restrictions/Precautions: General Precautions, Fall Risk  Position Activity Restriction  Other position/activity restrictions: 3L O2  Subjective   General  Chart Reviewed: Yes  Family / Caregiver Present: No  Referring Practitioner: Emre Ray  Subjective  Subjective: Pt agreeable to therapy. Reports the normal aches and pains. General Comment  Comments: Pt sitting in recliner upon arrival.  Pain Screening  Patient Currently in Pain: Yes(chronic pain, does not rate)  Vital Signs  Patient Currently in Pain: Yes(chronic pain, does not rate)       Objective      Transfers  Sit to Stand: Supervision  Stand to sit: Supervision  Bed to Chair: Supervision  Comment: performed 5 reps sit-stand from w/c to walker  Ambulation  Ambulation?: Yes  Ambulation 1  Surface: level tile  Device: Rolling Walker  Other Apparatus: O2(3L)  Assistance: Supervision  Quality of Gait: partial step through gait pattern, decreased oriana, forward flexed posture  Distance: 29', 32' and 32'        Exercises  Hamstring Sets: x20 BLE against green theraband seated  Hip Flexion: x20 BLE 2#  Hip Abduction: x20 BLE against green theraband  Knee Long Arc Quad: x20 BLE 2#  Ankle Pumps: x20 BLE  Other exercises  Other exercises 1: SciFit stepper 2.5 min intervals x3 reps with 2 min rest breaks between intervals     Goals  Short term goals  Time Frame for Short term goals: 1 week  Short term goal 1: Patient will perform sit<>supine with CGA. Goal met 8/26/19  Short term goal 2: Patient will perform sit<>stand transfers with CGA and LRAD. Goal met 8/26/19  Short term goal 3: Patient will perform bed<>chair transfers with CGA and LRAD.  Goal met 8/26/19  Short

## 2019-08-27 NOTE — PROGRESS NOTES
(10/25/10); pr colostomy (11/3/10); Cystoscopy; other surgical history (6/6/11); Colonoscopy (11/7/2011); colostomy (11/8/11); Prostate surgery; other surgical history (2012); other surgical history; TURP (2012); Cystoscopy (2/6/13); TURP (2/20/13); Cystoscopy (4/3/2013); other surgical history (11/20/2013); joint replacement; Cystocopy (3/19/14); Cystocopy (9/3/14); Cystoscopy (2/25/15); other surgical history (07/22/2015); Cystoscopy (N/A, 1/20/16); Cystoscopy (09/21/2016); bronchoscopy (N/A, 8/14/2019); and bronchoscopy (8/14/2019). Restrictions  Restrictions/Precautions  Restrictions/Precautions: General Precautions, Fall Risk  Position Activity Restriction  Other position/activity restrictions: 2L O2  Subjective   General  Chart Reviewed: Yes  Patient assessed for rehabilitation services?: Yes  Additional Pertinent Hx: morbid obesity, emphysema, hypercholesterolemia, COPD, B PEs, DVT, CHF, narcolepsy, acute cor pulmonale  Family / Caregiver Present: No  Referring Practitioner: Emre Ray MD  Diagnosis: sepsis 2* to pneumonia  Subjective  Subjective: pt in w/c, pleasant and agreeable  General Comment  Comments: RN approved therapy      Orientation  Orientation  Overall Orientation Status: Within Functional Limits  Objective    ADL  Feeding: Independent  Grooming: Modified independent (seated at sink)  UE Bathing: Minimal assistance  LE Bathing: Moderate assistance  UE Dressing: Minimal assistance  LE Dressing: Dependent/Total(difficulty using reacher to adolfo shorts)  Toileting: Dependent/Total        Balance  Sitting Balance: Independent  Standing Balance: Supervision  Standing Balance  Time: 45-60 sec x 4  Activity: transfers/toileting, to/from toilet  Functional Mobility  Functional - Mobility Device: Rolling Walker  Activity: To/from bathroom  Assist Level: Contact guard assistance  Toilet Transfers  Toilet - Technique: Stand pivot; Ambulating  Equipment Used: Standard toilet  Toilet Transfer: Stand by

## 2019-08-27 NOTE — PROGRESS NOTES
Kalvin Apley  8/27/2019  [id number]    Chief Complaint: COPD (chronic obstructive pulmonary disease) ([facility] [address]    Subjective:   No issues overnight; feels like his breathing has greatly improved over the past few days. ROS: no nausea, vomiting, fever, chills, chest pain, shortness of breath  Objective:  Patient Vitals for the past 24 hrs:   BP Temp Temp src Pulse Resp SpO2   08/27/19 1219 -- -- -- -- -- 98 %   08/27/19 0751 -- -- -- -- -- 98 %   08/27/19 0742 (!) 179/92 98.2 °F (36.8 °C) Oral 89 18 --   08/26/19 2052 -- -- -- -- 18 95 %   08/26/19 1945 (!) 154/81 98.2 °F (36.8 °C) Oral 93 18 94 %     Gen: No distress, pleasant. HEENT: Normocephalic, atraumatic. CV: Regular rate and rhythm. Resp: No respiratory distress. Abd: Soft, nontender   Ext: No edema. Neuro: Alert, oriented, appropriately interactive. Wt Readings from Last 3 Encounters:   08/26/19 282 lb (127.9 kg)   08/15/19 285 lb 4.4 oz (129.4 kg)   02/07/19 296 lb (134.3 kg)       Laboratory data:   Lab Results   Component Value Date    WBC 8.2 08/26/2019    HGB 13.7 08/26/2019    HCT 41.0 08/26/2019    MCV 92.6 08/26/2019     08/26/2019       Lab Results   Component Value Date     08/26/2019    K 3.7 08/26/2019    CL 95 08/26/2019    CO2 33 08/26/2019    BUN 14 08/26/2019    CREATININE <0.5 08/26/2019    GLUCOSE 161 08/26/2019    CALCIUM 9.2 08/26/2019        Therapy progress:  PT  Position Activity Restriction  Other position/activity restrictions: 3L O2  Objective     Sit to Stand: Supervision  Stand to sit: Supervision  Bed to Chair: Supervision  Device: Rolling Walker  Other Apparatus: O2(3L)  Assistance: Supervision  Distance: 34', 32' and 28'  OT  PT Equipment Recommendations  Equipment Needed: No  Toilet - Technique: Stand pivot, Ambulating  Equipment Used: Standard toilet  Toilet Transfers Comments: using grab bars  Assessment        SLP                Body mass index is 44.17 kg/m².     Rehabilitation Diagnosis:  16.0, Debility (non-cardiac, non-pulmonary     Assessment and Plan:  Community-acquired pneumonia. Antibiotics completed. All cultures no growth to date.     Pleural effusion status post thoracentesis. Cytology negative. Repeat CXR with no reaccumulation      Pulmonary embolism status post thrombolysis. Continue anticoagulation.       COPD. Outpatient follow-up with pulmonology. Continue current regimen. Continue supplemental oxygen.     Acute diastolic congestive heart failure. Continue diuretics. Follow daily weights.     Bowels: Schedule colace + senna. Follow bowel movements. Enema or suppository if needed.      Bladder: Check PVR x 3. [address] if PVR > 200ml or if any volume is > 500 ml.      Sleep: Trazodone provided prn. DME: Owns  CECILIA: 8/29 home with home health    Jennie Sorensen MD 8/27/2019, 2:34 PM

## 2019-08-28 NOTE — PROGRESS NOTES
Physical Therapy  Facility/Department: [address] [place] [address]  Daily Treatment Note  NAME: Franco Armijo  : 10/17/1933  MRN: [id number]    Date of Service: 2019    Discharge Recommendations:  24 hour supervision or assist, Home with Home health PT   PT Equipment Recommendations  Equipment Needed: No  Other: pt has necessary DME at home    Assessment   Body structures, Functions, Activity limitations: Decreased functional mobility ; Decreased endurance;Decreased strength;Decreased safe awareness;Decreased balance;Decreased ADL status; Decreased high-level IADLs  Assessment: Pt participated in ambulation and LE exercise this session. Able to tolerate ambulating up to 32ft this session although continues to fatigue quickly with activity. Pt reporting no concerns or questions at this time regarding mobility and safety upon d/c home tomorrow with family. Treatment Diagnosis: Decreased strength, endurance, and functional mobility  Prognosis: Good  Decision Making: Low Complexity  Patient Education: safety, transfers, gait, LE exercise  REQUIRES PT FOLLOW UP: Yes  Activity Tolerance  Activity Tolerance: Patient Tolerated treatment well;Patient limited by endurance     Patient Diagnosis(es): There were no encounter diagnoses. has a past medical history of Arthritis, BiPAP (biphasic positive airway pressure) dependence, Bladder carcinoma ([address] CAD (coronary artery disease), COPD (chronic obstructive pulmonary disease) ([address] Diabetes mellitus ([address] Diverticulosis of colon (without mention of hemorrhage), DVT (deep vein thrombosis) in pregnancy ([address] Hyperlipemia, Hypertension, melanoma, Narcolepsy, GENESIS treated with BiPAP, Pulmonary embolism ([address] and Sleep apnea. has a past surgical history that includes Abdomen surgery; Colon surgery (10/25/10); pr colostomy (11/3/10); Cystoscopy; other surgical history (11); Colonoscopy (2011); colostomy (11);  Prostate surgery; other surgical history (); other

## 2019-08-28 NOTE — PROGRESS NOTES
medical history of Arthritis, BiPAP (biphasic positive airway pressure) dependence, Bladder carcinoma ([facility] [address] CAD (coronary artery disease), COPD (chronic obstructive pulmonary disease) ([facility] [facility] Diabetes mellitus ([facility] [address] Diverticulosis of colon (without mention of hemorrhage), DVT (deep vein thrombosis) in pregnancy ([facility] [address] Hyperlipemia, Hypertension, melanoma, Narcolepsy, GENESIS treated with BiPAP, Pulmonary embolism ([facility] [address] and Sleep apnea. has a past surgical history that includes Abdomen surgery; Colon surgery (10/25/10); pr colostomy (11/3/10); Cystoscopy; other surgical history (6/6/11); Colonoscopy (11/7/2011); colostomy (11/8/11); Prostate surgery; other surgical history (2012); other surgical history; TURP (2012); Cystoscopy (2/6/13); TURP (2/20/13); Cystoscopy (4/3/2013); other surgical history (11/20/2013); joint replacement; Cystocopy (3/19/14); Cystocopy (9/3/14); Cystoscopy (2/25/15); other surgical history (07/22/2015); Cystoscopy (N/A, 1/20/16); Cystoscopy (09/21/2016); bronchoscopy (N/A, 8/14/2019); and bronchoscopy (8/14/2019). Restrictions  Restrictions/Precautions  Restrictions/Precautions: General Precautions, Fall Risk  Position Activity Restriction  Other position/activity restrictions: 3L O2  Subjective   General  Chart Reviewed: Yes  Response To Previous Treatment: Patient reporting fatigue but able to participate. Family / Caregiver Present: No  Referring Practitioner: Talita Villalobos  Subjective  Subjective: Pt agreeable to therapy.    General Comment  Comments: Pt sitting in recliner upon arrival.  Pain Screening  Patient Currently in Pain: Yes  Pain Assessment  Pain Assessment: 0-10  Pain Level: 7  Pain Location: Hip  Pain Orientation: Right  Functional Pain Assessment: Prevents or interferes some active activities and ADLs  Vital Signs  Pulse: 88  Patient Currently in Pain: Yes  Oxygen Therapy  SpO2: 95 %  O2 Flow Rate (L/min): 3 L/min       Orientation  Orientation  Overall Orientation Status: Within Normal Limits  Cognition   Cognition  Overall Cognitive Status: Exceptions  Following Commands: Follows one step commands consistently; Follows multistep commands with repitition  Attention Span: Attends with cues to redirect  Memory: Decreased recall of recent events;Decreased short term memory  Safety Judgement: Decreased awareness of need for safety;Decreased awareness of need for assistance  Problem Solving: Assistance required to generate solutions;Assistance required to identify errors made  Insights: Decreased awareness of deficits  Initiation: Requires cues for some  Sequencing: Requires cues for some  Objective   Bed mobility  Comment: Patient was in the wheelchair at the beginning and end of the PT treatment session. Transfers  Sit to Stand: Modified independent  Stand to sit: Modified independent  Bed to Chair: Supervision  Car Transfer: Supervision  Comment: performed 6 reps sit-stand from w/c to walker  Ambulation  Ambulation?: Yes  More Ambulation?: No  Ambulation 1  Surface: level tile;carpet  Device: Rolling Walker  Other Apparatus: O2(3L)  Assistance: Supervision  Quality of Gait: forward flexed posture, decreased gait velocity. Gait Deviations: Slow Jenny;Decreased step length;Decreased step height;Decreased head and trunk rotation;Decreased arm swing  Distance: 20 feet, 15 feet, 15 feet, 20 feet, 15 feet. Comments: O2 93-95% on 3L while ambulating. No complaints of chest pain or dizziness. Patient's RN aware of patient's shortness of breath. Educated patient on pursed lip breathing. Stairs  # Steps : 2(1 step followed by 2 steps')  Stairs Height: 6\"  Rails: Bilateral  Device: No Device  Assistance: Contact guard assistance  Comment: 3 minute rest break between stair attempts. Oxygen saturation was 94% on 3L on exertion. Cueing for safe foot placement.  Contact guard assistance applied because patient abruptly sat down in wheelchair with poor eccentric control of his hip

## 2019-08-29 NOTE — PROGRESS NOTES
Discharge instructions were reviewed with the patient and his daughter. They verbalized understanding. The room was checked and the patient confirmed that he had all his personal belongings. He was discharged to home in no distress. He was taken to a private vehicle for transport via wheelchair.

## 2019-08-31 NOTE — CARE COORDINATION
[facility] [facility] Transitions Initial Follow Up Call    Call within 2 business days of discharge: Yes    Patient: Tanner Lynch Patient : 10/17/1933   MRN: <[id number]>  Reason for Admission:  Pna, COPD, A/C CHF  Discharge Date: 19 RARS: Readmission Risk Score: 19    Facility: [facility]    2nd unsuccessful attempt to reach for initial Care Transition discharge call. Message left requesting call back.     Lorena Ribera RN

## 2019-09-03 NOTE — TELEPHONE ENCOUNTER
Pts daughter Rachna Duran calling and requesting refill on pts medication Norco. She states he just got out of hospital and needs it today if possible. Date of last refill of this med was 7/9/19, # of pills given 90 and # of refills given 0. Their next appointment is 9/10/19, the last date patient was seen was 6/10/19. Does patient have medication agreement on file? Yes  Has drug screen been done in last 12 months if needed?  no

## 2019-09-03 NOTE — CARE COORDINATION
[facility] Transitions Initial Follow Up Call    Call within 2 business days of discharge: Yes    Patient: Wilfredo Mast Patient : 10/17/1933   MRN: [id number]  Reason for Admission: PNA COPD   Discharge Date: 19 RARS: Readmission Risk Score: 19      Last Discharge [facility]       Complaint Diagnosis Description Type Department Provider    8/15/19   Admission (Discharged) Liberty Bermudez MD           Spoke with: Tracysteven Bryan patient's granddaughter     Facility: [facility]     Non-face-to-face services provided:  Obtained and reviewed discharge summary and/or continuity of care documents     Spoke with patient's granddaughter Amina, who stated patient was doing well and denied any issues with breathing. Patient was working with OT and Amina stated he is doing well. Reviewed new, changed, and stopped medications with Amina and she reported patient is taking all as prescribed. Amina stated patient has apt with PCP NP on 9/10 and waiting to hear back from pulmonologist regarding an apt. Denies any needs at present time. Agreeable to f/u calls. CTN advised patient of use of urgent care or physicians 24 hr access line if assistance is needed after hours. Also advised that they can always contact their home care provider to request a nurse visit even if it isn't their regularly scheduled day for their nurse to visit.              Care Transitions 24 Hour Call    Do you have any ongoing symptoms?:  No  Do you have a copy of your discharge instructions?:  Yes  Do you have all of your prescriptions and are they filled?:  Yes  Have you been contacted by a [address]?:  No  Have you scheduled your follow up appointment?:  Yes  How are you going to get to your appointment?:  Car - family or friend to transport  Were you discharged with any Home Care or Post Acute Services:  Yes  Post Acute Services:  Home Health (Comment: [facility] )  Patient Home Equipment:  BiPAP, Oxygen  Do you have support at home?:  Alone  Do you feel like you have everything you need to keep you well at home?:  Yes  Are you an active caregiver in your home?:  No  Care Transitions Interventions         Follow Up  Future Appointments   Date Time Provider Mayra Chapin   9/10/2019 11:00 AM MADY Reynoso - CNP [facility]       Yoel TOBIASN, RN, [facility]  Care Transition Nurse   [phone]

## 2019-09-10 PROBLEM — J96.01 ACUTE RESPIRATORY FAILURE WITH HYPOXEMIA (HCC): Status: RESOLVED | Noted: 2019-01-01 | Resolved: 2019-01-01

## 2019-09-10 NOTE — PATIENT INSTRUCTIONS
birth control pills. These may increase your risk of blot clots. · Ask your doctor about wearing compression stockings to help prevent blood clots in your legs. There are different types of stockings, and they need to fit right. So your doctor will recommend what you need. When should you call for help? Call 911 anytime you think you may need emergency care. For example, call if:    · You have shortness of breath.     · You have chest pain.     · You passed out (lost consciousness).     · You cough up blood.    Call your doctor now or seek immediate medical care if:    · You have new or worsening pain or swelling in your leg.    Watch closely for changes in your health, and be sure to contact your doctor if:    · You do not get better as expected. Where can you learn more? Go to [email or web address].[email or web address]. org and sign in to your [employer] account. Enter S620 in the InMyRoom box to learn more about \"Pulmonary Embolism: Care Instructions. \"     If you do not have an account, please click on the \"Sign Up Now\" link. Current as of: September 26, 2018  Content Version: 12.1  © 8010-2773 [employer]wise, [employer]orporated. Care instructions adapted under license by [facility] ([facility]). If you have questions about a medical condition or this instruction, always ask your healthcare professional. [address] any warranty or liability for your use of this information.

## 2019-12-11 PROBLEM — R79.89 ELEVATED LACTIC ACID LEVEL: Status: RESOLVED | Noted: 2019-01-01 | Resolved: 2019-01-01

## 2019-12-11 PROBLEM — J90 PLEURAL EFFUSION: Status: RESOLVED | Noted: 2019-01-01 | Resolved: 2019-01-01

## 2019-12-11 PROBLEM — J18.9 PNA (PNEUMONIA): Status: RESOLVED | Noted: 2019-01-01 | Resolved: 2019-01-01

## 2019-12-11 PROBLEM — J98.11 ATELECTASIS: Status: RESOLVED | Noted: 2019-01-01 | Resolved: 2019-01-01

## 2019-12-11 PROBLEM — J18.9 CAP (COMMUNITY ACQUIRED PNEUMONIA): Status: RESOLVED | Noted: 2019-01-01 | Resolved: 2019-01-01

## 2020-01-01 ENCOUNTER — TELEPHONE (OUTPATIENT)
Dept: OTHER | Facility: CLINIC | Age: 85
End: 2020-01-01

## 2020-01-01 ENCOUNTER — OFFICE VISIT (OUTPATIENT)
Dept: FAMILY MEDICINE CLINIC | Age: 85
End: 2020-01-01
Payer: MEDICARE

## 2020-01-01 ENCOUNTER — VIRTUAL VISIT (OUTPATIENT)
Dept: FAMILY MEDICINE CLINIC | Age: 85
End: 2020-01-01
Payer: MEDICARE

## 2020-01-01 ENCOUNTER — ANESTHESIA (OUTPATIENT)
Dept: OPERATING ROOM | Age: 85
End: 2020-01-01
Payer: MEDICARE

## 2020-01-01 ENCOUNTER — ANESTHESIA EVENT (OUTPATIENT)
Dept: OPERATING ROOM | Age: 85
End: 2020-01-01
Payer: MEDICARE

## 2020-01-01 ENCOUNTER — INITIAL CONSULT (OUTPATIENT)
Dept: SURGERY | Age: 85
End: 2020-01-01
Payer: MEDICARE

## 2020-01-01 ENCOUNTER — HOSPITAL ENCOUNTER (OUTPATIENT)
Age: 85
Setting detail: OUTPATIENT SURGERY
Discharge: HOME | End: 2020-03-04
Attending: SURGERY | Admitting: SURGERY
Payer: MEDICARE

## 2020-01-01 VITALS — SYSTOLIC BLOOD PRESSURE: 131 MMHG | HEART RATE: 96 BPM | DIASTOLIC BLOOD PRESSURE: 75 MMHG | TEMPERATURE: 97.7 F

## 2020-01-01 VITALS
SYSTOLIC BLOOD PRESSURE: 162 MMHG | DIASTOLIC BLOOD PRESSURE: 68 MMHG | HEART RATE: 86 BPM | OXYGEN SATURATION: 96 % | RESPIRATION RATE: 16 BRPM | TEMPERATURE: 97.4 F

## 2020-01-01 VITALS
WEIGHT: 287 LBS | HEART RATE: 99 BPM | DIASTOLIC BLOOD PRESSURE: 70 MMHG | BODY MASS INDEX: 44.95 KG/M2 | OXYGEN SATURATION: 90 % | SYSTOLIC BLOOD PRESSURE: 120 MMHG

## 2020-01-01 VITALS — SYSTOLIC BLOOD PRESSURE: 128 MMHG | DIASTOLIC BLOOD PRESSURE: 82 MMHG

## 2020-01-01 VITALS — DIASTOLIC BLOOD PRESSURE: 59 MMHG | SYSTOLIC BLOOD PRESSURE: 103 MMHG | OXYGEN SATURATION: 100 %

## 2020-01-01 LAB
EKG ATRIAL RATE: 87 BPM
EKG DIAGNOSIS: NORMAL
EKG P AXIS: 35 DEGREES
EKG P-R INTERVAL: 192 MS
EKG Q-T INTERVAL: 344 MS
EKG QRS DURATION: 78 MS
EKG QTC CALCULATION (BAZETT): 413 MS
EKG R AXIS: -19 DEGREES
EKG T AXIS: 36 DEGREES
EKG VENTRICULAR RATE: 87 BPM
GLUCOSE BLD-MCNC: 147 MG/DL (ref 70–99)
GRAM STAIN RESULT: NORMAL
PERFORMED ON: ABNORMAL
WOUND/ABSCESS: NORMAL

## 2020-01-01 PROCEDURE — 4040F PNEUMOC VAC/ADMIN/RCVD: CPT | Performed by: NURSE PRACTITIONER

## 2020-01-01 PROCEDURE — G8484 FLU IMMUNIZE NO ADMIN: HCPCS | Performed by: NURSE PRACTITIONER

## 2020-01-01 PROCEDURE — 88304 TISSUE EXAM BY PATHOLOGIST: CPT

## 2020-01-01 PROCEDURE — 1123F ACP DISCUSS/DSCN MKR DOCD: CPT | Performed by: NURSE PRACTITIONER

## 2020-01-01 PROCEDURE — G8427 DOCREV CUR MEDS BY ELIG CLIN: HCPCS | Performed by: NURSE PRACTITIONER

## 2020-01-01 PROCEDURE — 3600000002 HC SURGERY LEVEL 2 BASE: Performed by: SURGERY

## 2020-01-01 PROCEDURE — 99213 OFFICE O/P EST LOW 20 MIN: CPT | Performed by: NURSE PRACTITIONER

## 2020-01-01 PROCEDURE — 3700000000 HC ANESTHESIA ATTENDED CARE: Performed by: SURGERY

## 2020-01-01 PROCEDURE — G8427 DOCREV CUR MEDS BY ELIG CLIN: HCPCS | Performed by: SURGERY

## 2020-01-01 PROCEDURE — 2500000003 HC RX 250 WO HCPCS: Performed by: SURGERY

## 2020-01-01 PROCEDURE — 7100000010 HC PHASE II RECOVERY - FIRST 15 MIN: Performed by: SURGERY

## 2020-01-01 PROCEDURE — 2709999900 HC NON-CHARGEABLE SUPPLY: Performed by: SURGERY

## 2020-01-01 PROCEDURE — 6360000002 HC RX W HCPCS: Performed by: SURGERY

## 2020-01-01 PROCEDURE — G8417 CALC BMI ABV UP PARAM F/U: HCPCS | Performed by: NURSE PRACTITIONER

## 2020-01-01 PROCEDURE — 7100000011 HC PHASE II RECOVERY - ADDTL 15 MIN: Performed by: SURGERY

## 2020-01-01 PROCEDURE — 4004F PT TOBACCO SCREEN RCVD TLK: CPT | Performed by: NURSE PRACTITIONER

## 2020-01-01 PROCEDURE — 93005 ELECTROCARDIOGRAM TRACING: CPT | Performed by: ANESTHESIOLOGY

## 2020-01-01 PROCEDURE — 6360000002 HC RX W HCPCS: Performed by: NURSE ANESTHETIST, CERTIFIED REGISTERED

## 2020-01-01 PROCEDURE — 11406 EXC TR-EXT B9+MARG >4.0 CM: CPT | Performed by: SURGERY

## 2020-01-01 PROCEDURE — 12034 INTMD RPR S/TR/EXT 7.6-12.5: CPT | Performed by: SURGERY

## 2020-01-01 PROCEDURE — 1123F ACP DISCUSS/DSCN MKR DOCD: CPT | Performed by: SURGERY

## 2020-01-01 PROCEDURE — 99203 OFFICE O/P NEW LOW 30 MIN: CPT | Performed by: SURGERY

## 2020-01-01 PROCEDURE — 10060 I&D ABSCESS SIMPLE/SINGLE: CPT | Performed by: NURSE PRACTITIONER

## 2020-01-01 PROCEDURE — G8484 FLU IMMUNIZE NO ADMIN: HCPCS | Performed by: SURGERY

## 2020-01-01 PROCEDURE — 4040F PNEUMOC VAC/ADMIN/RCVD: CPT | Performed by: SURGERY

## 2020-01-01 PROCEDURE — G8417 CALC BMI ABV UP PARAM F/U: HCPCS | Performed by: SURGERY

## 2020-01-01 PROCEDURE — 3700000001 HC ADD 15 MINUTES (ANESTHESIA): Performed by: SURGERY

## 2020-01-01 PROCEDURE — 2500000003 HC RX 250 WO HCPCS: Performed by: ANESTHESIOLOGY

## 2020-01-01 PROCEDURE — 2580000003 HC RX 258: Performed by: SURGERY

## 2020-01-01 PROCEDURE — 2580000003 HC RX 258: Performed by: ANESTHESIOLOGY

## 2020-01-01 PROCEDURE — 4004F PT TOBACCO SCREEN RCVD TLK: CPT | Performed by: SURGERY

## 2020-01-01 PROCEDURE — 93010 ELECTROCARDIOGRAM REPORT: CPT | Performed by: INTERNAL MEDICINE

## 2020-01-01 PROCEDURE — 3600000012 HC SURGERY LEVEL 2 ADDTL 15MIN: Performed by: SURGERY

## 2020-01-01 RX ORDER — APIXABAN 5 MG/1
TABLET, FILM COATED ORAL
Qty: 60 TABLET | Refills: 2 | Status: SHIPPED | OUTPATIENT
Start: 2020-01-01

## 2020-01-01 RX ORDER — OXYCODONE HYDROCHLORIDE AND ACETAMINOPHEN 5; 325 MG/1; MG/1
2 TABLET ORAL PRN
Status: DISCONTINUED | OUTPATIENT
Start: 2020-01-01 | End: 2020-01-01 | Stop reason: HOSPADM

## 2020-01-01 RX ORDER — HYDROCODONE BITARTRATE AND ACETAMINOPHEN 5; 325 MG/1; MG/1
1 TABLET ORAL EVERY 6 HOURS PRN
Qty: 120 TABLET | Refills: 0 | Status: SHIPPED | OUTPATIENT
Start: 2020-01-01 | End: 2020-01-01 | Stop reason: SDUPTHER

## 2020-01-01 RX ORDER — SODIUM CHLORIDE, SODIUM LACTATE, POTASSIUM CHLORIDE, CALCIUM CHLORIDE 600; 310; 30; 20 MG/100ML; MG/100ML; MG/100ML; MG/100ML
INJECTION, SOLUTION INTRAVENOUS CONTINUOUS
Status: DISCONTINUED | OUTPATIENT
Start: 2020-01-01 | End: 2020-01-01 | Stop reason: HOSPADM

## 2020-01-01 RX ORDER — PROMETHAZINE HYDROCHLORIDE 25 MG/ML
6.25 INJECTION, SOLUTION INTRAMUSCULAR; INTRAVENOUS
Status: DISCONTINUED | OUTPATIENT
Start: 2020-01-01 | End: 2020-01-01 | Stop reason: HOSPADM

## 2020-01-01 RX ORDER — CLINDAMYCIN HYDROCHLORIDE 300 MG/1
300 CAPSULE ORAL 3 TIMES DAILY
Qty: 21 CAPSULE | Refills: 0 | Status: SHIPPED | OUTPATIENT
Start: 2020-01-01 | End: 2020-01-01

## 2020-01-01 RX ORDER — LISINOPRIL 40 MG/1
TABLET ORAL
Qty: 30 TABLET | Refills: 0 | Status: SHIPPED | OUTPATIENT
Start: 2020-01-01

## 2020-01-01 RX ORDER — MAGNESIUM HYDROXIDE 1200 MG/15ML
LIQUID ORAL CONTINUOUS PRN
Status: COMPLETED | OUTPATIENT
Start: 2020-01-01 | End: 2020-01-01

## 2020-01-01 RX ORDER — METHYLPREDNISOLONE 4 MG/1
TABLET ORAL
Qty: 1 KIT | Refills: 0 | Status: SHIPPED | OUTPATIENT
Start: 2020-01-01

## 2020-01-01 RX ORDER — HYDROCODONE BITARTRATE AND ACETAMINOPHEN 5; 325 MG/1; MG/1
1 TABLET ORAL EVERY 6 HOURS PRN
Qty: 120 TABLET | Refills: 0
Start: 2020-01-01 | End: 2020-01-01 | Stop reason: SDUPTHER

## 2020-01-01 RX ORDER — SODIUM CHLORIDE 0.9 % (FLUSH) 0.9 %
10 SYRINGE (ML) INJECTION PRN
Status: DISCONTINUED | OUTPATIENT
Start: 2020-01-01 | End: 2020-01-01 | Stop reason: HOSPADM

## 2020-01-01 RX ORDER — SODIUM CHLORIDE 0.9 % (FLUSH) 0.9 %
10 SYRINGE (ML) INJECTION EVERY 12 HOURS SCHEDULED
Status: DISCONTINUED | OUTPATIENT
Start: 2020-01-01 | End: 2020-01-01 | Stop reason: SDUPTHER

## 2020-01-01 RX ORDER — FUROSEMIDE 40 MG/1
TABLET ORAL
Qty: 30 TABLET | Refills: 0 | Status: SHIPPED | OUTPATIENT
Start: 2020-01-01 | End: 2020-01-01

## 2020-01-01 RX ORDER — SODIUM CHLORIDE 0.9 % (FLUSH) 0.9 %
10 SYRINGE (ML) INJECTION PRN
Status: CANCELLED | OUTPATIENT
Start: 2020-01-01

## 2020-01-01 RX ORDER — BUPROPION HYDROCHLORIDE 150 MG/1
TABLET ORAL
Qty: 30 TABLET | Refills: 0 | Status: SHIPPED | OUTPATIENT
Start: 2020-01-01

## 2020-01-01 RX ORDER — SODIUM CHLORIDE 0.9 % (FLUSH) 0.9 %
10 SYRINGE (ML) INJECTION EVERY 12 HOURS SCHEDULED
Status: CANCELLED | OUTPATIENT
Start: 2020-01-01

## 2020-01-01 RX ORDER — MORPHINE SULFATE 10 MG/ML
2 INJECTION, SOLUTION INTRAMUSCULAR; INTRAVENOUS EVERY 5 MIN PRN
Status: DISCONTINUED | OUTPATIENT
Start: 2020-01-01 | End: 2020-01-01 | Stop reason: HOSPADM

## 2020-01-01 RX ORDER — TIZANIDINE 4 MG/1
TABLET ORAL
Qty: 90 TABLET | Refills: 0 | Status: SHIPPED | OUTPATIENT
Start: 2020-01-01

## 2020-01-01 RX ORDER — FUROSEMIDE 40 MG/1
TABLET ORAL
Qty: 30 TABLET | Refills: 2 | Status: SHIPPED | OUTPATIENT
Start: 2020-01-01

## 2020-01-01 RX ORDER — SODIUM CHLORIDE 0.9 % (FLUSH) 0.9 %
10 SYRINGE (ML) INJECTION EVERY 12 HOURS SCHEDULED
Status: DISCONTINUED | OUTPATIENT
Start: 2020-01-01 | End: 2020-01-01 | Stop reason: HOSPADM

## 2020-01-01 RX ORDER — POTASSIUM CHLORIDE 750 MG/1
TABLET, FILM COATED, EXTENDED RELEASE ORAL
Qty: 30 TABLET | Refills: 2 | Status: SHIPPED | OUTPATIENT
Start: 2020-01-01

## 2020-01-01 RX ORDER — ONDANSETRON 2 MG/ML
4 INJECTION INTRAMUSCULAR; INTRAVENOUS PRN
Status: DISCONTINUED | OUTPATIENT
Start: 2020-01-01 | End: 2020-01-01 | Stop reason: HOSPADM

## 2020-01-01 RX ORDER — LABETALOL HYDROCHLORIDE 5 MG/ML
5 INJECTION, SOLUTION INTRAVENOUS EVERY 10 MIN PRN
Status: DISCONTINUED | OUTPATIENT
Start: 2020-01-01 | End: 2020-01-01 | Stop reason: HOSPADM

## 2020-01-01 RX ORDER — HYDRALAZINE HYDROCHLORIDE 20 MG/ML
5 INJECTION INTRAMUSCULAR; INTRAVENOUS EVERY 10 MIN PRN
Status: DISCONTINUED | OUTPATIENT
Start: 2020-01-01 | End: 2020-01-01 | Stop reason: HOSPADM

## 2020-01-01 RX ORDER — SULFAMETHOXAZOLE AND TRIMETHOPRIM 800; 160 MG/1; MG/1
1 TABLET ORAL 2 TIMES DAILY
Qty: 20 TABLET | Refills: 0 | Status: SHIPPED | OUTPATIENT
Start: 2020-01-01 | End: 2020-01-01

## 2020-01-01 RX ORDER — CLINDAMYCIN HYDROCHLORIDE 300 MG/1
300 CAPSULE ORAL 3 TIMES DAILY
Qty: 30 CAPSULE | Refills: 0 | Status: SHIPPED | OUTPATIENT
Start: 2020-01-01 | End: 2020-01-01

## 2020-01-01 RX ORDER — HYDROCODONE BITARTRATE AND ACETAMINOPHEN 5; 325 MG/1; MG/1
2 TABLET ORAL 3 TIMES DAILY
Qty: 180 TABLET | Refills: 0 | Status: SHIPPED | OUTPATIENT
Start: 2020-01-01 | End: 2020-01-01

## 2020-01-01 RX ORDER — LISINOPRIL 20 MG/1
TABLET ORAL
Qty: 30 TABLET | Refills: 0 | OUTPATIENT
Start: 2020-01-01

## 2020-01-01 RX ORDER — MORPHINE SULFATE 10 MG/ML
1 INJECTION, SOLUTION INTRAMUSCULAR; INTRAVENOUS EVERY 5 MIN PRN
Status: DISCONTINUED | OUTPATIENT
Start: 2020-01-01 | End: 2020-01-01 | Stop reason: HOSPADM

## 2020-01-01 RX ORDER — PROPOFOL 10 MG/ML
INJECTION, EMULSION INTRAVENOUS PRN
Status: DISCONTINUED | OUTPATIENT
Start: 2020-01-01 | End: 2020-01-01 | Stop reason: SDUPTHER

## 2020-01-01 RX ORDER — DIPHENHYDRAMINE HYDROCHLORIDE 50 MG/ML
12.5 INJECTION INTRAMUSCULAR; INTRAVENOUS
Status: DISCONTINUED | OUTPATIENT
Start: 2020-01-01 | End: 2020-01-01 | Stop reason: HOSPADM

## 2020-01-01 RX ORDER — FENOFIBRATE 200 MG/1
CAPSULE ORAL
Qty: 30 CAPSULE | Refills: 0 | Status: SHIPPED | OUTPATIENT
Start: 2020-01-01

## 2020-01-01 RX ORDER — SODIUM CHLORIDE 0.9 % (FLUSH) 0.9 %
10 SYRINGE (ML) INJECTION PRN
Status: DISCONTINUED | OUTPATIENT
Start: 2020-01-01 | End: 2020-01-01 | Stop reason: SDUPTHER

## 2020-01-01 RX ORDER — OXYCODONE HYDROCHLORIDE AND ACETAMINOPHEN 5; 325 MG/1; MG/1
1 TABLET ORAL PRN
Status: DISCONTINUED | OUTPATIENT
Start: 2020-01-01 | End: 2020-01-01 | Stop reason: HOSPADM

## 2020-01-01 RX ADMIN — Medication 2000 MG: at 09:03

## 2020-01-01 RX ADMIN — PROPOFOL 250 MG: 10 INJECTION, EMULSION INTRAVENOUS at 09:41

## 2020-01-01 RX ADMIN — FAMOTIDINE 20 MG: 10 INJECTION, SOLUTION INTRAVENOUS at 07:32

## 2020-01-01 RX ADMIN — SODIUM CHLORIDE, POTASSIUM CHLORIDE, SODIUM LACTATE AND CALCIUM CHLORIDE: 600; 310; 30; 20 INJECTION, SOLUTION INTRAVENOUS at 07:32

## 2020-01-01 ASSESSMENT — PULMONARY FUNCTION TESTS
PIF_VALUE: 0

## 2020-01-01 ASSESSMENT — ENCOUNTER SYMPTOMS
EYES NEGATIVE: 1
RESPIRATORY NEGATIVE: 1
EYES NEGATIVE: 1
GASTROINTESTINAL NEGATIVE: 1
COUGH: 0
SHORTNESS OF BREATH: 1

## 2020-01-01 ASSESSMENT — PAIN SCALES - GENERAL
PAINLEVEL_OUTOF10: 0
PAINLEVEL_OUTOF10: 0

## 2020-01-01 ASSESSMENT — PATIENT HEALTH QUESTIONNAIRE - PHQ9
2. FEELING DOWN, DEPRESSED OR HOPELESS: 0
SUM OF ALL RESPONSES TO PHQ9 QUESTIONS 1 & 2: 0
SUM OF ALL RESPONSES TO PHQ QUESTIONS 1-9: 0
SUM OF ALL RESPONSES TO PHQ QUESTIONS 1-9: 0
1. LITTLE INTEREST OR PLEASURE IN DOING THINGS: 0

## 2020-02-13 NOTE — PROGRESS NOTES
Subjective:      Patient ID: Alysha Porras is a 80 y.o. male. HPI    Chief Complaint   Patient presents with    Other     infected boil on back     Abscess  Patient presents for evaluation of a cutaneous abscess. Lesion is located in the upper back. Onset was 1 week ago. Symptoms have stabilized. Abscess has associated symptoms of spontaneous drainage. Patient does have previous history of cutaneous abscesses. Patient does have diabetes. Grand-daughter reports her mom expressed a moderate amount of purulent exudate from site last evening. Review of Systems   Constitutional: Negative for appetite change, chills and fever. HENT: Negative. Eyes: Negative. Respiratory: Negative. Cardiovascular: Negative. Genitourinary: Negative. Musculoskeletal: Positive for arthralgias. Skin:        Abscess - upper back. Firm. Non-fluctuant. Mild surround erythema and edema. Neurological: Negative.         Patient Active Problem List   Diagnosis    Bladder carcinoma ([address]    History of melanoma    Arthritis    Hypercholesteremia    Gastroesophageal reflux disease without esophagitis    Narcolepsy    History of diverticulitis of colon    Benign prostatic hyperplasia with urinary obstruction    Smokeless tobacco use    Type 2 diabetes mellitus without complication, without long-term current use of insulin ([address]    Essential hypertension    Panlobular emphysema ([address]    Coronary artery disease involving native coronary artery of native heart without angina pectoris    GENESIS on CPAP    Morbid obesity due to excess calories (HCC)    History of Bell's palsy    History of tobacco abuse    COPD (chronic obstructive pulmonary disease) ([address]       Outpatient Medications Marked as Taking for the 2/13/20 encounter (Office Visit) with MADY Bob CNP   Medication Sig Dispense Refill    HYDROcodone-acetaminophen (NORCO) 5-325 MG per tablet Take 2 tablets by mouth 3 times daily for 30 days. 180 tablet 0    furosemide (LASIX) 40 MG tablet TAKE (1) TABLET DAILY 30 tablet 0    metFORMIN (GLUCOPHAGE-XR) 500 MG extended release tablet TAKE (1) TABLET DAILY WITH BREAKFAST. 30 tablet 5    glipiZIDE (GLUCOTROL) 5 MG tablet Take 1 tablet by mouth daily 30 tablet 5    apixaban (ELIQUIS) 5 MG TABS tablet Take 1 tablet by mouth 2 times daily 60 tablet 5    atorvastatin (LIPITOR) 40 MG tablet TAKE (1) TABLET DAILY 90 tablet 1    buPROPion (WELLBUTRIN XL) 150 MG extended release tablet TAKE 1 TABLET EVERY MORNING 30 tablet 3    lisinopril (PRINIVIL;ZESTRIL) 40 MG tablet TAKE (1) TABLET DAILY 30 tablet 3    potassium chloride (KLOR-CON M) 10 MEQ extended release tablet TAKE 1 TABLET EVERY DAY 30 tablet 2    fenofibrate micronized (LOFIBRA) 200 MG capsule TAKE 1 CAPSULE BY MOUTH EVERY MORNING BEFORE BREAKFAST 30 capsule 5    blood glucose test strips (ONE TOUCH ULTRA TEST) strip TEST ONCE DAILY DX: E11.9 100 strip 3    tiZANidine (ZANAFLEX) 4 MG tablet TAKE 1 TABLET NIGHTLY 90 tablet 0    albuterol-ipratropium (COMBIVENT RESPIMAT)  MCG/ACT AERS inhaler Inhale 1 puff into the lungs every 4 hours as needed for Wheezing 1 Inhaler 2    famotidine (PEPCID) 20 MG tablet Take 1 tablet by mouth 2 times daily 60 tablet 3    Blood Glucose Monitoring Suppl ANDRIA 1 applicator by Does not apply route daily DX: E11.9 ONE TOUCH MACHINE AND TESTS ONCE A DAY 1 Device 0    Lancets MISC DX E11.9 AND TESTS ONCE A  each 3       No Known Allergies    Social History     Tobacco Use    Smoking status: Former Smoker     Packs/day: 1.00     Years: 70.00     Pack years: 70.00     Last attempt to quit: 3/6/2010     Years since quittin.9    Smokeless tobacco: Current User     Types: Chew    Tobacco comment: chews   Substance Use Topics    Alcohol use: No       Objective:   /75 (Site: Left Upper Arm)   Pulse 96   Temp 97.7 °F (36.5 °C)     Physical Exam  Vitals signs and nursing note reviewed. Constitutional:       Appearance: Normal appearance. He is well-developed and well-groomed. He is not ill-appearing or toxic-appearing. HENT:      Head: Normocephalic and atraumatic. Nose: Nose normal.      Mouth/Throat:      Mouth: Mucous membranes are moist.   Eyes:      Extraocular Movements: Extraocular movements intact. Conjunctiva/sclera: Conjunctivae normal.   Neck:      Musculoskeletal: Normal range of motion and neck supple. Thyroid: No thyromegaly. Cardiovascular:      Rate and Rhythm: Normal rate and regular rhythm. Heart sounds: Normal heart sounds. No murmur. No friction rub. No gallop. Pulmonary:      Effort: Pulmonary effort is normal. No respiratory distress. Breath sounds: Normal breath sounds. Abdominal:      General: Bowel sounds are normal.      Palpations: Abdomen is soft. Musculoskeletal:      Right shoulder: He exhibits pain. Right hip: He exhibits tenderness (pain). Right lower le+ Pitting Edema present. Left lower le+ Pitting Edema present. Lymphadenopathy:      Cervical: No cervical adenopathy. Skin:     General: Skin is warm and dry. Findings: No rash. Neurological:      Mental Status: He is alert and oriented to person, place, and time. Coordination: Coordination normal.   Psychiatric:         Behavior: Behavior is cooperative. Assessment/Plan:   1. Inflamed sebaceous cyst  Patient presents today with a sebaceous cyst of upper back. Patient with a history of sebaceous cyst.  Patient reports his daughter did express a moderate amount of exudate from the site. On exam today area is erythematous, slightly swollen and firm to touch. Area is nonfluctuant. I do not believe incision and drainage would be beneficial.  Advised on skin care and recommend treatment as below. Advised to follow-up if no better worsening of symptoms. Patient/granddaughter verbalized understanding and agreeable to plan.   - sulfamethoxazole-trimethoprim (BACTRIM DS;SEPTRA DS) 800-160 MG per tablet; Take 1 tablet by mouth 2 times daily for 10 days  Dispense: 20 tablet; Refill: 0    2. Arthritis  Patient request decrease in pain medication as he does not take 2 tablets due to fear of falls. Discussed trying otc topical agents such as salon pas with lidocaine. Next refill will reflect change as noted below. Patient to f/u if no better or worsening of symptoms.  - HYDROcodone-acetaminophen (NORCO) 5-325 MG per tablet; Take 1 tablet by mouth every 6 hours as needed for Pain for up to 30 days. Dispense: 120 tablet;  Refill: 0

## 2020-02-25 NOTE — PROGRESS NOTES
Subjective:      Patient ID: Yonas Vasquez is a 80 y.o. male. HPI    Chief Complaint   Patient presents with    Other     Boil on back      Yonas Vasquez is a 80 y.o. male who presents for evaluation of a subcutaneous nodule located over the back. This has been present for several months. There has been discharge, redness, swelling and pain. Patient does have a history of epidermal inclusion cysts. Patient was seen 2/13/2020 and treated with a 10-day course of Bactrim. Patient reports no improvement in symptoms. Nodule continues to drain purulent/green drainage with a foul odor. Review of Systems   Constitutional: Negative for appetite change, chills and fever. HENT: Negative. Cardiovascular: Negative. Gastrointestinal: Negative. Musculoskeletal: Positive for arthralgias. Skin: Positive for wound (draining cyst - upper back). Neurological: Negative. Psychiatric/Behavioral: Negative.         Patient Active Problem List   Diagnosis    Bladder carcinoma ([address]    History of melanoma    Arthritis    Hypercholesteremia    Gastroesophageal reflux disease without esophagitis    Narcolepsy    History of diverticulitis of colon    Benign prostatic hyperplasia with urinary obstruction    Smokeless tobacco use    Type 2 diabetes mellitus without complication, without long-term current use of insulin ([address]    Essential hypertension    Panlobular emphysema ([address]    Coronary artery disease involving native coronary artery of native heart without angina pectoris    GENESIS on CPAP    Morbid obesity due to excess calories ([address]    History of Bell's palsy    History of tobacco abuse    COPD (chronic obstructive pulmonary disease) ([address]       Outpatient Medications Marked as Taking for the 2/25/20 encounter (Office Visit) with MADY Maldonado CNP   Medication Sig Dispense Refill    HYDROcodone-acetaminophen (NORCO) 5-325 MG per tablet Take 1 tablet by mouth every 6 Exam  Vitals signs and nursing note reviewed. Constitutional:       General: He is not in acute distress. Appearance: Normal appearance. He is well-developed. He is not ill-appearing or toxic-appearing. HENT:      Head: Normocephalic and atraumatic. Nose: Nose normal.      Mouth/Throat:      Mouth: Mucous membranes are moist.   Eyes:      Conjunctiva/sclera: Conjunctivae normal.   Neck:      Musculoskeletal: Neck supple. Cardiovascular:      Rate and Rhythm: Normal rate and regular rhythm. Heart sounds: Normal heart sounds. Pulmonary:      Effort: Pulmonary effort is normal.      Breath sounds: Normal breath sounds. Abdominal:      General: Bowel sounds are normal.      Palpations: Abdomen is soft. Skin:     General: Skin is warm and dry. Findings: Abscess (see description ) present. No rash. Neurological:      Mental Status: He is alert and oriented to person, place, and time. Psychiatric:         Attention and Perception: Attention normal.         Mood and Affect: Mood normal.         Speech: Speech normal.         Behavior: Behavior normal. Behavior is cooperative. Thought Content: Thought content normal.         Assessment/Plan:   1. Epidermal cyst  Patient presents today to follow-up on subcutaneous nodule of upper back with purulent/green drainage, redness, swelling and pain. Patient reports a cyst has been present for several months however has worsened over the past 2 weeks. Patient was seen on 2/13/2020 and treated with a 10-day course of Bactrim with little improvement in symptoms. Cyst continues to drain as noted above. Area cleansed with Betadine and anesthetized with 1% lidocaine with epinephrine. Area opened with a #11 scalpel. A large amount of purulent/green drainage expressed from nodule. Culture obtained. Recommend antibiotics as below. Pressure held to site until no further active bleeding. Pressure dressing applied.   Advised granddaughter to monitor closely for bleeding as patient is on anticoagulation therapy. Recommend patient follow-up with surgeon to have cyst removed. Patient/granddaughter verbalized understanding. See patient instructions. - Elenita Mo MD, General Surgery, [facility]  - clindamycin (CLEOCIN) 300 MG capsule; Take 1 capsule by mouth 3 times daily for 10 days  Dispense: 30 capsule;  Refill: 0  - WOUND CULTURE

## 2020-02-25 NOTE — PATIENT INSTRUCTIONS
Please read the healthy family handout that you were given and share it with your family. Please compare this printed medication list with your medications at home to be sure they are the same. If you have any medications that are different please contact us immediately at [phone]. Also review your allergies that we have listed, these may also include medications that you have not been able to tolerate, make sure everything listed is correct. If you have any allergies that are different please contact us immediately at [phone]. Patient Education        Epidermoid Cyst: Care Instructions  Your Care Instructions  An epidermoid (say \"bs-xal-CVZ-mary\") cyst is a lump just under the skin. These cysts can form when a hair follicle becomes blocked. They are common in acne and may occur on the face, neck, back, and genitals. However, they can form anywhere on the body. These cysts are not cancer and do not lead to cancer. They tend not to hurt, but they can sometimes become swollen and painful. They also may break open (rupture) and cause scarring. These cysts sometimes do not cause problems and may not need treatment. If you have a cyst that is swollen and hurts, your doctor may inject it with a medicine to help it heal. But it is more likely that a painful cyst will need to be removed. Your doctor will give you a shot of numbing medicine and cut into the cyst to drain it or remove it. This makes the symptoms go away. Follow-up care is a key part of your treatment and safety. Be sure to make and go to all appointments, and call your doctor if you are having problems. It's also a good idea to know your test results and keep a list of the medicines you take. How can you care for yourself at home? · Do not squeeze the cyst or poke it with a needle to open it. This can cause swelling, redness, and infection. · Always have a doctor look at any new lumps you get to make sure that they are not serious.   When should you call for help? Watch closely for changes in your health, and be sure to contact your doctor if:    · You have a fever, redness, or swelling after you get a shot of medicine in the cyst.     · You see or feel a new lump on your skin. Where can you learn more? Go to [email or web address] and sign in to your [employer] account. Enter W819 in the [employer] box to learn more about \"Epidermoid Cyst: Care Instructions. \"     If you do not have an account, please click on the \"Sign Up Now\" link. Current as of: April 1, 2019  Content Version: 12.3  © 7246-6635 [employer]. Care instructions adapted under license by [facility] ([facility]). If you have questions about a medical condition or this instruction, always ask your healthcare professional. [place] [address] any warranty or liability for your use of this information. Patient Education        clindamycin (oral/injection)  Pronunciation:  klin da MYE sin  Brand:  Cleocin HCl, Cleocin Pediatric, Cleocin Phosphate  What is the most important information I should know about clindamycin? Clindamycin can cause diarrhea, which may be severe or lead to serious, life-threatening intestinal problems. If you have diarrhea that is watery or bloody, stop using clindamycin and call your doctor. What is clindamycin? Clindamycin is an antibiotic that is used to treat serious infections caused by bacteria. Clindamycin may also be used for purposes not listed in this medication guide. What should I discuss with my healthcare provider before using clindamycin? You should not use this medicine if you are allergic to clindamycin or lincomycin. Tell your doctor if you have ever had:  · colitis, Crohn's disease, or other intestinal disorder;  · eczema, or allergic skin reaction;  · asthma or a severe allergic reaction to aspirin;  · liver disease; or  · an allergy to yellow food dye.   It is not known whether this medicine will harm an unborn baby. Tell your doctor if you are pregnant or plan to become pregnant. Clindamycin can pass into breast milk and may cause side effects in the nursing baby. If you are breastfeeding while taking this medicine, call your doctor if your baby has diaper rash, redness or white patches in the mouth or throat, stomach discomfort, or diarrhea that is watery or bloody. Clindamycin injection may contain an ingredient that can cause serious side effects or death in very young or premature babies. Do not give this medicine to a child without medical advice. How should I use clindamycin? Follow all directions on your prescription label and read all medication guides or instruction sheets. Use the medicine exactly as directed. Clindamycin oral is taken by mouth. Clindamycin injection is injected into a muscle, or as an infusion into a vein. A healthcare provider will give your first dose and may teach you how to properly use the medication by yourself. Take the capsule with a full glass of water to keep it from irritating your throat. Measure liquid medicine carefully. Use the dosing syringe provided, or use a medicine dose-measuring device (not a kitchen spoon). You may need frequent medical tests during treatment. If you need surgery, tell your surgeon you currently use clindamycin. Use this medicine for the full prescribed length of time, even if your symptoms quickly improve. Skipping doses can increase your risk of infection that is resistant to medication. Clindamycin will not treat a viral infection such as the flu or a common cold. Store at room temperature away from moisture and heat. Protect the injectable medicine from high heat. Do not store the oral liquid in the refrigerator. Throw away any unused oral liquid after 2 weeks. Use a needle and syringe only once and then place them in a puncture-proof \"sharps\" container.  Follow state or local laws about how to dispose of this container. Keep it out of the reach of children and pets. What happens if I miss a dose? Use the medicine as soon as you can, but skip the missed dose if it is almost time for your next dose. Do not use two doses at one time. What happens if I overdose? Seek emergency medical attention or call the Poison Help line at 1-192.236.7167. What should I avoid while using clindamycin? Antibiotic medicines can cause diarrhea, which may be a sign of a new infection. If you have diarrhea that is watery or bloody, call your doctor. Do not use anti-diarrhea medicine unless your doctor tells you to. What are the possible side effects of clindamycin? Get emergency medical help if you have signs of an allergic reaction (hives, difficult breathing, swelling in your face or throat) or a severe skin reaction (fever, sore throat, burning in your eyes, skin pain, red or purple skin rash that spreads and causes blistering and peeling). Seek medical treatment if you have a serious drug reaction that can affect many parts of your body. Symptoms may include: skin rash, fever, swollen glands, flu-like symptoms, muscle aches, severe weakness, unusual bruising, or yellowing of your skin or eyes. Call your doctor at once if you have:  · any change in bowel habits;  · severe stomach pain, diarrhea that is watery or bloody;  · little or no urination; or  · a metallic taste in your mouth (after clindamycin injection). Common side effects may include:  · nausea, vomiting, stomach pain;  · mild skin rash; or  · vaginal itching or discharge; This is not a complete list of side effects and others may occur. Call your doctor for medical advice about side effects. You may report side effects to FDA at 2-488-DAS-4228. What other drugs will affect clindamycin? Sometimes it is not safe to use certain medications at the same time.  Some drugs can affect your blood levels of other drugs you take, which may increase side effects or make the medications less effective. Other drugs may affect clindamycin, including prescription and over-the-counter medicines, vitamins, and herbal products. Tell your doctor about all your current medicines and any medicine you start or stop using. Where can I get more information? Your pharmacist can provide more information about clindamycin. Remember, keep this and all other medicines out of the reach of children, never share your medicines with others, and use this medication only for the indication prescribed. Every effort has been made to ensure that the information provided by [address] is accurate, up-to-date, and complete, but no guarantee is made to that effect. Drug information contained herein may be time sensitive. [facility] information has been compiled for use by healthcare practitioners and consumers in the [place] and therefore [facility] does not warrant that uses outside of the [place] are appropriate, unless specifically indicated otherwise. [facility]'s drug information does not endorse drugs, diagnose patients or recommend therapy. [facility] drug information is an informational resource designed to assist licensed healthcare practitioners in caring for their patients and/or to serve consumers viewing this service as a supplement to, and not a substitute for, the expertise, skill, knowledge and judgment of healthcare practitioners. The absence of a warning for a given drug or drug combination in no way should be construed to indicate that the drug or drug combination is safe, effective or appropriate for any given patient. [facility] does not assume any responsibility for any aspect of healthcare administered with the aid of information [facility] provides. The information contained herein is not intended to cover all possible uses, directions, precautions, warnings, drug interactions, allergic reactions, or adverse effects.  If you have questions about the drugs you are taking, check

## 2020-03-02 PROBLEM — L72.9 CYST OF SKIN: Status: ACTIVE | Noted: 2020-01-01

## 2020-03-04 NOTE — PROGRESS NOTES
Pt given coffee and crackers. Pt denies pain or other needs. Call light in reach. Granddaughter at bedside.

## 2020-03-04 NOTE — ANESTHESIA PRE PROCEDURE
(ELIQUIS) 5 MG TABS tablet Take 1 tablet by mouth 2 times daily 12/2/19   MADY Maldonado CNP   blood glucose test strips (ONE TOUCH ULTRA TEST) strip TEST ONCE DAILY DX: E11.9 9/17/19   MADY Maldonado CNP   Blood Glucose Monitoring Suppl ANDRIA 1 applicator by Does not apply route daily DX: E11.9 ONE TOUCH MACHINE AND TESTS ONCE A DAY 7/13/16   Brittani Mcgill MD   Lancets MISC DX E11.9 AND TESTS ONCE A DAY 7/13/16   Brittani Mcgill MD       Current medications:    Current Facility-Administered Medications   Medication Dose Route Frequency Provider Last Rate Last Dose    lactated ringers infusion   Intravenous Continuous Dayanara Schrader  mL/hr at 03/04/20 0732      sodium chloride flush 0.9 % injection 10 mL  10 mL Intravenous 2 times per day Dayanara Schrader MD        sodium chloride flush 0.9 % injection 10 mL  10 mL Intravenous PRN Dayanara Schrader MD        vancomycin 2 g in dextrose 5% 500 ml IVPB  2,000 mg Intravenous Once Mik Boyce MD           Allergies:  No Known Allergies    Problem List:    Patient Active Problem List   Diagnosis Code    [facility] C67.9    History of melanoma Z85.820    Arthritis M19.90    Hypercholesteremia E78.00    Gastroesophageal reflux disease without esophagitis K21.9    Narcolepsy G47.419    History of diverticulitis of colon Z87.19    Benign prostatic hyperplasia with urinary obstruction N40.1, N13.8    Smokeless tobacco use Z72.0    Type 2 diabetes mellitus without complication, without long-term current use of insulin ([facility]) E11.9    Essential hypertension I10    Panlobular emphysema ([facility] J43.1    Coronary artery disease involving native coronary artery of native heart without angina pectoris I25.10    GENESIS on CPAP G47.33, Z99.89    Morbid obesity due to excess calories ([facility]) E66.01    History of Bell's palsy Z86.69    History of tobacco abuse Z87.891    COPD (chronic obstructive pulmonary disease) (HCC) J44.9    Cyst of skin L72.9       Past Medical History:        Diagnosis Date    Acute respiratory failure with hypoxemia ([address] 8/3/2019    Acute saddle pulmonary embolism with acute cor pulmonale (HCC)     Arthritis     Atelectasis 8/4/2019    BiPAP (biphasic positive airway pressure) dependence     uses for sleep apnea    Bladder carcinoma ([address]     CAD (coronary artery disease)     CAP (community acquired pneumonia) 8/3/2019    COPD (chronic obstructive pulmonary disease) (HCC)     Diabetes mellitus (HCC)     Diverticulosis of colon (without mention of hemorrhage)     Diverticulosis    DVT (deep vein thrombosis) in pregnancy 2011    Elevated lactic acid level 8/3/2019    Hyperlipemia     Hypertension     melanoma     Narcolepsy     GENESIS treated with BiPAP     Pleural effusion 8/4/2019    PNA (pneumonia) 8/3/2019    Pulmonary embolism ([address] 2011    Sleep apnea     cpap       Past Surgical History:        Procedure Laterality Date    ABDOMEN SURGERY      BRONCHOSCOPY N/A 8/14/2019    BRONCHOSCOPY ALVEOLAR LAVAGE performed by Sudhakar Xie MD at [address] [place] [address]  8/14/2019    BRONCHOSCOPY THERAPUTIC ASPIRATION INITIAL performed by Sudhakar Xie MD at [address] [place] [address]  10/25/10    perforated diverticulum    COLONOSCOPY  11/7/2011    COLOSTOMY  11/8/11    colostomy reversal    CYSTOSCOPY      CYSTOSCOPY  2/6/13    RESECTION OF PROSTATE TUMOR    CYSTOSCOPY  4/3/2013    small tumor resection with laser, fulgeration    CYSTOSCOPY  3/19/14    bladder bx.     CYSTOSCOPY  9/3/14    CYSTOSCOPY, TRANSURETHRAL RESECTION OF THE PROSTATE, TRANSURETHRAL RESECTION OF BLADDER TUMOR    CYSTOSCOPY  2/25/15    CYSTOSCOPY N/A 1/20/16    TRANSURETHRAL RESECTION OF THE PROSTATE    CYSTOSCOPY  09/21/2016    CYSTOSCOPY, URETHERAL DILATION      JOINT REPLACEMENT      bilateral knee    OTHER SURGICAL HISTORY  6/6/11    TURP    OTHER SURGICAL HISTORY  2012    cystoscopy with urethral dilation    OTHER SURGICAL HISTORY      Colostomy Reversal    OTHER SURGICAL HISTORY  11/20/2013    cystoscopy, TUR bladder tumor with Holmium laser    OTHER SURGICAL HISTORY  07/22/2015    cystoscopy, urethral dilatation    [place] COLOSTOMY  11/3/10    bowel resect w/colostomy    PROSTATE SURGERY      TURP  2012    TURP  2/20/13       Social History:    Social History     Tobacco Use    Smoking status: Former Smoker     Packs/day: 1.00     Years: 70.00     Pack years: 70.00     Last attempt to quit: 3/6/2010     Years since quitting: 10.0    Smokeless tobacco: Current User     Types: Chew    Tobacco comment: chews   Substance Use Topics    Alcohol use:  No                                Ready to quit: Not Answered  Counseling given: Not Answered  Comment: chews      Vital Signs (Current):   Vitals:    03/04/20 0715   BP: (!) 153/82   Pulse: 84   Resp: 18   Temp: 97.4 °F (36.3 °C)   TempSrc: Temporal                                              BP Readings from Last 3 Encounters:   03/04/20 (!) 153/82   03/02/20 128/82   02/25/20 120/70       NPO Status: Time of last liquid consumption: 0530                        Time of last solid consumption: 2000                        Date of last liquid consumption: 03/04/20(sip water AM meds)                        Date of last solid food consumption: 03/03/20    BMI:   Wt Readings from Last 3 Encounters:   02/25/20 287 lb (130.2 kg)   12/11/19 287 lb 6.4 oz (130.4 kg)   09/10/19 294 lb 12.8 oz (133.7 kg)     There is no height or weight on file to calculate BMI.    CBC:   Lab Results   Component Value Date    WBC 8.3 08/29/2019    RBC 4.32 08/29/2019    HGB 13.5 08/29/2019    HCT 40.2 08/29/2019    MCV 93.1 08/29/2019    RDW 14.2 08/29/2019     08/29/2019       CMP:   Lab Results   Component Value Date     12/11/2019    K 4.9 12/11/2019    K 3.7 08/29/2019    CL 93 12/11/2019    CO2 29 12/11/2019 BUN 18 12/11/2019    CREATININE 0.6 12/11/2019    GFRAA >60 12/11/2019    GFRAA >60 04/03/2013    AGRATIO 1.9 09/10/2019    LABGLOM >60 12/11/2019    GLUCOSE 176 12/11/2019    PROT 6.4 09/10/2019    PROT 6.5 09/06/2011    CALCIUM 9.7 12/11/2019    BILITOT 0.3 09/10/2019    ALKPHOS 47 09/10/2019    AST 18 09/10/2019    ALT 21 09/10/2019       POC Tests:   Recent Labs     03/04/20  0743   POCGLU 147*       Coags:   Lab Results   Component Value Date    PROTIME 19.5 08/08/2019    PROTIME 33.0 10/20/2011    INR 1.71 08/08/2019    APTT 43.4 08/05/2019       HCG (If Applicable): No results found for: PREGTESTUR, PREGSERUM, HCG, HCGQUANT     ABGs:   Lab Results   Component Value Date    PHART 7.421 08/04/2019    PO2ART 66.2 08/04/2019    HKQ7ARM 45.4 08/04/2019    ACM3ASZ 28.9 08/04/2019    BEART 3.7 08/04/2019    I8VOYOEO 93.7 08/04/2019        Type & Screen (If Applicable):  Lab Results   Component Value Date    LABABO A 11/08/2011    [facility] SG Negative 11/08/2011       Anesthesia Evaluation  Patient summary reviewed and Nursing notes reviewed  Airway: Mallampati: II  TM distance: >3 FB   Neck ROM: full  Mouth opening: > = 3 FB Dental:      Comment: Poor dentition    Pulmonary:   (+) COPD:  sleep apnea:  decreased breath sounds,                             Cardiovascular:    (+) hypertension:, CAD:, CHF:,         Rhythm: regular  Rate: normal                    Neuro/Psych:   Negative Neuro/Psych ROS              GI/Hepatic/Renal:   (+) GERD:,           Endo/Other:    (+) Diabetes, . Abdominal:   (+) obese,         Vascular: negative vascular ROS. Anesthesia Plan      MAC     ASA 4       Induction: intravenous. Anesthetic plan and risks discussed with patient. Plan discussed with CRNA.                   Kellie Headley MD   3/4/2020

## 2020-03-04 NOTE — PROGRESS NOTES
Report received from [facility]. Pt ready to go. Waiting for antibiotic to infuse.

## 2020-03-04 NOTE — H&P
[facility] GENERAL SURGERY      The H&P was reviewed, the patient was examined, and no change has occurred in the patient's condition since the H&P was completed. The indications for the procedure were reviewed, and any questions were answered. I updated the progress note from 3/2/2020 which is the H&P.     Vitals:    03/04/20 0715   BP: (!) 153/82   Pulse: 84   Resp: 18   Temp: 97.4 °F (36.3 °C)

## 2020-03-04 NOTE — PROGRESS NOTES
BLADDER TUMOR    CYSTOSCOPY  2/25/15    CYSTOSCOPY N/A 1/20/16    TRANSURETHRAL RESECTION OF THE PROSTATE    CYSTOSCOPY  09/21/2016    CYSTOSCOPY, URETHERAL DILATION      JOINT REPLACEMENT      bilateral knee    OTHER SURGICAL HISTORY  6/6/11    TURP    OTHER SURGICAL HISTORY  2012    cystoscopy with urethral dilation    OTHER SURGICAL HISTORY      Colostomy Reversal    OTHER SURGICAL HISTORY  11/20/2013    cystoscopy, TUR bladder tumor with Holmium laser    OTHER SURGICAL HISTORY  07/22/2015    cystoscopy, urethral dilatation    [place] COLOSTOMY  11/3/10    bowel resect w/colostomy    PROSTATE SURGERY      TURP  2012    TURP  2/20/13       No current facility-administered medications on file prior to visit. Current Outpatient Medications on File Prior to Visit   Medication Sig Dispense Refill    clindamycin (CLEOCIN) 300 MG capsule Take 1 capsule by mouth 3 times daily for 10 days 30 capsule 0    HYDROcodone-acetaminophen (NORCO) 5-325 MG per tablet Take 1 tablet by mouth every 6 hours as needed for Pain for up to 30 days. 120 tablet 0    furosemide (LASIX) 40 MG tablet TAKE (1) TABLET DAILY 30 tablet 0    metFORMIN (GLUCOPHAGE-XR) 500 MG extended release tablet TAKE (1) TABLET DAILY WITH BREAKFAST.  30 tablet 5    glipiZIDE (GLUCOTROL) 5 MG tablet Take 1 tablet by mouth daily 30 tablet 5    apixaban (ELIQUIS) 5 MG TABS tablet Take 1 tablet by mouth 2 times daily 60 tablet 5    atorvastatin (LIPITOR) 40 MG tablet TAKE (1) TABLET DAILY 90 tablet 1    buPROPion (WELLBUTRIN XL) 150 MG extended release tablet TAKE 1 TABLET EVERY MORNING 30 tablet 3    lisinopril (PRINIVIL;ZESTRIL) 40 MG tablet TAKE (1) TABLET DAILY 30 tablet 3    potassium chloride (KLOR-CON M) 10 MEQ extended release tablet TAKE 1 TABLET EVERY DAY 30 tablet 2    fenofibrate micronized (LOFIBRA) 200 MG capsule TAKE 1 CAPSULE BY MOUTH EVERY MORNING BEFORE BREAKFAST 30 capsule 5    blood glucose test strips (ONE TOUCH ULTRA TEST) family history on file. ROS:  He reports no complaints related to the eyes, ears , nose throat or mouth. He denies weight loss. No chest pain. No SOB. No urinary complaints. No musculoskeletal complaints. Skin complaints include lump mid back. .  No neurologic deficits. No bleeding tendencies. No GI complaints. Physical Exam:  Vitals:    03/02/20 1536   BP: 128/82     General:  Comfortable. No distress. Eyes:  No scleral icterus  Ears:  Normal  Nose:  Normal  Mouth:  Mucous membranes moist  Respiratory: Lungs CTA. No accessory muscle use. Heart:  Regular rhythm  Abdomen:  Soft. Non tender. Non distended. Musculoskeletal:  No abnormal movements. ROM extremities normal.  Skin:  No rashes. Lesion    Location:   Mid back   Pigmented:   Yes   Diameter:  4 cm   Crusting absent             Neurologic:  No focal deficits. Psychiatric:  AAA. O x 3.            ASSESSMENT:  1. Sebaceous cyst            PLAN:  There are some irregular characteristics to this suspected infected sebaceous cyst.  No fluctuance to suggest that I&D would be helpful at this time. Plan to hold Eliquis and excise the area. The diagnosis and recommended procedure were explained. Questions answered. Prepare for surgery. Greater than 50% visit spent counseling about diagnosis, treatment plan and expected post operative course.

## 2020-03-04 NOTE — PROGRESS NOTES
.Patient pre-op admission complete see flow sheet. IV started. IV antibiotic spiked and hung for OR. Safety checks complete. Call light and family at bedside.

## 2020-03-05 NOTE — OP NOTE
MD    D: 03/05/2020 9:49:25       T: 03/05/2020 9:57:41     [email or web address]  Job#: [id number]     Doc#: [id number]    CC:

## 2020-04-02 NOTE — TELEPHONE ENCOUNTER
Date of last refill of this med was 2/13, # of pills given 120 and # of refills given 0. Their next appointment is 4/17, the last date patient was seen was 12/11. Does patient have medication agreement on file? Yes  Has drug screen been done in last 12 months if needed?  no

## 2020-04-10 NOTE — TELEPHONE ENCOUNTER
Future appt scheduled 04/17/2020      Last appt 02/25/2020      Last Filled       buPROPion (WELLBUTRIN XL) 150 MG extended release tablet  11/20/2019  #30  3 RF     lisinopril (PRINIVIL;ZESTRIL) 40 MG tablet  11/05/2019  #30  3 RF

## 2020-04-17 NOTE — PROGRESS NOTES
2020    TELEHEALTH EVALUATION -- Audio/Visual (During [facility] public health emergency)  Chief Complaint   Patient presents with    Check-Up    Hip Pain     on steroids       HPI:    Chip Mendioladavid (:  10/17/1933) has requested an audio/video evaluation for the following concern(s):    Diabetes Mellitus Type 2: Current symptoms/problems include none. Medication compliance:  compliant all of the time  Diabetic diet compliance:  noncompliant: most of the time,  Weight trend: stable. Current exercise: no regular exercise  Barriers: impairment:  physical: arthritis pain    Home blood sugar records: fasting range: 100-150  Any episodes of hypoglycemia? no  Eye exam current (within one year): no   reports that he quit smoking about 10 years ago. He has a 70.00 pack-year smoking history. His smokeless tobacco use includes chew. Daily Aspirin? Yes    Lab Results   Component Value Date    LABA1C 7.6 2019    LABA1C 6.8 09/10/2019    LABA1C 7.5 2019     Lab Results   Component Value Date    LABMICR YES 2019    CREATININE 0.6 (L) 2019     Lab Results   Component Value Date    ALT 21 09/10/2019    AST 18 09/10/2019     Lab Results   Component Value Date    CHOL 164 2019    TRIG 228 (H) 2019    HDL 40 2019    LDLCALC 78 2019    LDLDIRECT 111 (H) 2016        Hypertension:  Home blood pressure monitoring: No.  He is not adherent to a low sodium diet. Patient denies chest pain, shortness of breath, headache, lightheadedness, blurred vision, palpitations and dry cough. Antihypertensive medication side effects: no medication side effects noted. Use of agents associated with hypertension: none.                                         Sodium (mmol/L)   Date Value   2019 136    BUN (mg/dL)   Date Value   2019 18    Glucose (mg/dL)   Date Value   2019 176 (H)      Potassium (mmol/L)   Date Value   2019 4.9     Potassium reflex Magnesium (mmol/L)   Date Value   08/29/2019 3.7    CREATININE (mg/dL)   Date Value   12/11/2019 0.6 (L)         Review of Systems   Constitutional: Negative for appetite change, chills and fever. HENT: Negative. Eyes: Negative. Respiratory: Positive for shortness of breath (chronic mild. no change). Negative for cough. Cardiovascular: Positive for leg swelling (chronic. Wraps during the day and intermittently elevates which has been effective at keep edema to a minimal. ). Negative for chest pain and palpitations. Endocrine: Negative. Negative for polydipsia, polyphagia and polyuria. Genitourinary: Negative. Musculoskeletal: Positive for arthralgias. Skin: Negative. Neurological: Negative. Psychiatric/Behavioral: Negative for sleep disturbance. Prior to Visit Medications    Medication Sig Taking? Authorizing Provider   lisinopril (PRINIVIL;ZESTRIL) 40 MG tablet TAKE (1) TABLET DAILY Yes MADY Barney CNP   buPROPion (WELLBUTRIN XL) 150 MG extended release tablet TAKE 1 TABLET EVERY MORNING Yes MADY Choi CNP   methylPREDNISolone (MEDROL, DAVID,) 4 MG tablet Take by mouth. Yes MIMI Fernandes   HYDROcodone-acetaminophen (NORCO) 5-325 MG per tablet Take 1 tablet by mouth every 6 hours as needed for Pain for up to 30 days. Yes MADY Choi CNP   tiZANidine (ZANAFLEX) 4 MG tablet TAKE 1 TABLET NIGHTLY Yes MADY Choi CNP   potassium chloride (KLOR-CON) 10 MEQ extended release tablet TAKE 1 TABLET EVERY DAY Yes MADY Choi CNP   furosemide (LASIX) 40 MG tablet TAKE (1) TABLET DAILY Yes MADY Barney CNP   metFORMIN (GLUCOPHAGE-XR) 500 MG extended release tablet TAKE (1) TABLET DAILY WITH BREAKFAST.  Yes MADY Choi CNP   glipiZIDE (GLUCOTROL) 5 MG tablet Take 1 tablet by mouth daily Yes MADY Choi CNP   apixaban (ELIQUIS) 5 MG TABS tablet Take 1 tablet by mouth 2 times typically run 100-150. Patient denies any signs or symptoms of hypo-hyperglycemia. Patient continues to take medications as directed. Will defer labs for now due to current coronavirus pandemic. Recommend patient follow-up in office in 2 to 3 months or sooner via virtual visit as needed. Discussed importance of remain quarantined throughout the coronavirus pandemic. Encourage patient to call with any problems or concerns. Patient verbalized understanding and agreeable to plan. 2. Essential hypertension  Typically well controlled. Patient does not check blood pressure at home. Patient is taking medication as prescribed. Recommend patient continue with current medication regimen. 3. Panlobular emphysema (HCC)  Stable. 4. Coronary artery disease involving native coronary artery of native heart without angina pectoris  Asymptomatic. 5. GENESIS on CPAP  Uses CPAP as ordered. 6. Arthritis  Chronic joint pain. Reports pain is fairly well controlled with current treatment. No need for refills at this time. Return in about 2 months (around 6/17/2020), or as needed. Manpreet Hannah is a 80 y.o. male being evaluated by a Virtual Visit (video visit) encounter to address concerns as mentioned above. A caregiver was present when appropriate. Due to this being a TeleHealth encounter (During [facility]-74 public health emergency), evaluation of the following organ systems was limited: Vitals/Constitutional/EENT/Resp/CV/GI//MS/Neuro/Skin/Heme-Lymph-Imm. Pursuant to the emergency declaration under the [facility], [address] [place] [address] authority and the [employer] and [employer] Act, this Virtual Visit was conducted with patient's (and/or legal guardian's) consent, to reduce the patient's risk of exposure to COVID-19 and provide necessary medical care.   The patient (and/or legal guardian) has also been advised to contact this office for

## 2020-04-29 NOTE — TELEPHONE ENCOUNTER
Controlled Substance Monitoring:    Acute and Chronic Pain Monitoring:   RX Monitoring 4/29/2020   Attestation -   Periodic Controlled Substance Monitoring No signs of potential drug abuse or diversion identified.

## 2023-03-13 NOTE — TELEPHONE ENCOUNTER
Date of last refill of this med was 2/16, # of pills given 90 and # of refills given 0. Their next appointment is 5/22, the last date patient was seen was 1/18. Does patient have medication agreement on file? Yes  Has drug screen been done in last 12 months if needed? no  Has [place] report been ran in last 90 days?  Yes 1/18 Problem: Metabolic/Fluid and Electrolytes - Adult  Goal: Glucose maintained within prescribed range  Note: Glucose remains poorly controlled. Still checking ACHS. Problem: Chronic Conditions and Co-morbidities  Goal: Patient's chronic conditions and co-morbidity symptoms are monitored and maintained or improved  Note: Patient's EF (Ejection Fraction) is less than 40%    Heart Failure Medications:  Diuretics[de-identified] None    (One of the following REQUIRED for EF </= 40%/SYSTOLIC FAILURE but MAY be used in EF% >40%/DIASTOLIC FAILURE)        ACE[de-identified] None        ARB[de-identified] None         ARNI[de-identified] Sacubitril/Valsartan-Entresto    (Beta Blockers)  NON- Evidenced Based Beta Blocker (for EF% >40%/DIASTOLIC FAILURE): None    Evidenced Based Beta Blocker::(REQUIRED for EF% <40%/SYSTOLIC FAILURE) Metoprolol SUCCinate- Toprol XL  . .................................................................................................................................................. Patient's weights and intake/output reviewed: Yes    Patient's Last Weight: 224.1 lbs obtained by standing scale. Difference of 2.7 lbs more than last documented weight. Pt has HD today.       Intake/Output Summary (Last 24 hours) at 3/13/2023 0517  Last data filed at 3/12/2023 2156  Gross per 24 hour   Intake 840 ml   Output 0 ml   Net 840 ml       Comorbidities Reviewed Yes    Patient has a past medical history of Ambulatory dysfunction, Aortic stenosis, Arthritis, Asthma, Bilateral hilar adenopathy syndrome, CAD (coronary artery disease), Cardiomyopathy ([address] CHF (congestive heart failure) ([address] Chronic pain, COPD (chronic obstructive pulmonary disease) ([address] Depression, Diabetes mellitus ([facility] Difficult intravenous access, Emphysema of lung ([facility] ESRD (end stage renal disease) on dialysis ([facility] Fear of needles, Gastric ulcer, GERD (gastroesophageal reflux disease), Heart valve problem, Hemodialysis patient ([facility] History of spinal fracture, Hx of blood clots, Hyperlipidemia, Hypertension, MI (myocardial infarction) ([address] Neuromuscular disorder ([address] Numbness and tingling in left arm, Pneumonia, PONV (postoperative nausea and vomiting), Prolonged emergence from general anesthesia, Sleep apnea, Stroke ([address] TIA (transient ischemic attack), and Unspecified diseases of blood and blood-forming organs. >>For CHF and Comorbidity documentation on Education Time and Topics, please see Education Tab    Progressive Mobility Assessment:  What is this patient's Current Level of Mobility?: Ambulatory-Up Ad Magalis  How was this patient Mobilized today?: Edge of Bed, Up to Chair,  Up to Toilet/Shower, and Up in Room, ambulated 10 ft                 With Whom? Self (refuses to call for ambulation assistance and refuses bed alarm)                 Level of Difficulty/Assistance: Independent     Pt sitting in bed at this time on  4 L O2. Pt with complaints of shortness of breath. Pt with nonpitting lower extremity edema. Patient and/or Family's stated Goal of Care this Admission: reduce shortness of breath and be more comfortable prior to discharge     Problem: Discharge Planning  Goal: Discharge to home or other facility with appropriate resources  Outcome: Progressing  Note: May D/C home today after HD.

## 2025-04-11 NOTE — PATIENT INSTRUCTIONS
Noted on Holter monitoring by cardiology  Initiated on metoprolol 2/23/2025, continue   Patient Education        Skin Abscess: Care Instructions  Your Care Instructions    A skin abscess is a bacterial infection that forms a pocket of pus. A boil is a kind of skin abscess. The doctor may have cut an opening in the abscess so that the pus can drain out. You may have gauze in the cut so that the abscess will stay open and keep draining. You may need antibiotics. You will need to follow up with your doctor to make sure the infection has gone away. The doctor has checked you carefully, but problems can develop later. If you notice any problems or new symptoms, get medical treatment right away. Follow-up care is a key part of your treatment and safety. Be sure to make and go to all appointments, and call your doctor if you are having problems. It's also a good idea to know your test results and keep a list of the medicines you take. How can you care for yourself at home? · Apply warm and dry compresses, a heating pad set on low, or a hot water bottle 3 or 4 times a day for pain. Keep a cloth between the heat source and your skin. · If your doctor prescribed antibiotics, take them as directed. Do not stop taking them just because you feel better. You need to take the full course of antibiotics. · Take pain medicines exactly as directed. ? If the doctor gave you a prescription medicine for pain, take it as prescribed. ? If you are not taking a prescription pain medicine, ask your doctor if you can take an over-the-counter medicine. · Keep your bandage clean and dry. Change the bandage whenever it gets wet or dirty, or at least one time a day. · If the abscess was packed with gauze:  ? Keep follow-up appointments to have the gauze changed or removed. If the doctor instructed you to remove the gauze, follow the instructions you were given for how to remove it. ? After the gauze is removed, soak the area in warm water for 15 to 20 minutes 2 times a day, until the wound closes.   When should you call pneumonia. Sulfamethoxazole and trimethoprim may also be used for purposes not listed in this medication guide. What should I discuss with my healthcare provider before using sulfamethoxazole and trimethoprim? You should not use this medicine if you are allergic to sulfamethoxazole or trimethoprim, or if you have:  · severe liver disease;  · kidney disease that is not being treated or monitored;  · anemia (low red blood cells) caused by folic acid deficiency;  · a history of low blood platelets after taking trimethoprim or any sulfa drug; or  · if you take dofetilide (Tikosyn). Do not use if you are pregnant. Use effective birth control, and tell your doctor if you become pregnant. Do not breastfeed while using this medicine. This medicine should not be given to a child younger than 3 months old. Tell your doctor if you have ever had:  · kidney or liver disease;  · a folate (folic acid) deficiency;  · asthma or severe allergies;  · a thyroid disorder;  · HIV or AIDS;  · malnourishment;  · alcoholism;  · high levels of potassium in your blood;  · porphyria, or glucose-6-phosphate dehydrogenase (G6PD) deficiency; or  · if you use a blood thinner (such as warfarin) and you have routine \"INR\" or prothrombin time tests. How should I use sulfamethoxazole and trimethoprim? This medicine is taken by mouth (oral) or given as an infusion into a vein (injection). Follow all directions on your prescription label and read all medication guides or instruction sheets. Use the medicine exactly as directed. Shake the oral suspension (liquid) before you measure a dose. Use the dosing syringe provided, or use a medicine dose-measuring device (not a kitchen spoon). A healthcare provider will give the first injection and may teach you how to properly use the medication by yourself. When using injections by yourself, be sure you understand how to properly mix and store the medicine.  Ask your doctor or pharmacist if you don't nurse or pharmacist.  Copyright 8776-4820 [employer]. Version: 9.01. Revision date: 3/8/2019. Care instructions adapted under license by [facility] ([facility]). If you have questions about a medical condition or this instruction, always ask your healthcare professional. [address] any warranty or liability for your use of this information.

## (undated) DEVICE — NEEDLE HYPO 25GA L1.5IN BLU POLYPR HUB S STL REG BVL STR

## (undated) DEVICE — SINGLE USE BIOPSY VALVE MAJ-210: Brand: SINGLE USE BIOPSY VALVE (STERILE)

## (undated) DEVICE — TUBING, SUCTION, 3/16" X 12', STRAIGHT: Brand: MEDLINE

## (undated) DEVICE — SYRINGE MED 50ML LUERSLIP TIP

## (undated) DEVICE — TUBING, SUCTION, 3/16" X 6', STRAIGHT: Brand: MEDLINE

## (undated) DEVICE — SUTURE VCRL SZ 4-0 L18IN ABSRB UD L19MM PS-2 3/8 CIR PRIM J496H

## (undated) DEVICE — GOWN SIRUS NONREIN XL W/TWL: Brand: MEDLINE INDUSTRIES, INC.

## (undated) DEVICE — LINER,SOFT,SUCTION CANISTER,1500CC: Brand: MEDLINE

## (undated) DEVICE — ENDO CARRY-ON PROCEDURE KIT INCLUDES SUCTION TUBING, LUBRICANT, GAUZE, BIOHAZARD STICKER, TRANSPORT PAD AND INTERCEPT BEDSIDE KIT.: Brand: ENDO CARRY-ON PROCEDURE KIT

## (undated) DEVICE — CANNULA,OXY,ADULT,SUPERSOFT,W/7'TUB,SC: Brand: MEDLINE INDUSTRIES, INC.

## (undated) DEVICE — YANKAUER,BULB TIP,W/O VENT,RIGID,STERILE: Brand: MEDLINE

## (undated) DEVICE — MASK CAPNOGRAPHY AD W35IN DIA58IN SAMP LN L10FT O2 LN

## (undated) DEVICE — TIP SUCT DIA12FR W STYL CTRL VENT DISPOSABLE FRAZ

## (undated) DEVICE — APPLICATOR PREP 26ML 0.7% IOD POVACRYLEX 74% ISO ALC ST

## (undated) DEVICE — CONMED SCOPE SAVER BITE BLOCK, 20X27 MM: Brand: SCOPE SAVER

## (undated) DEVICE — MAJOR SET UP PK

## (undated) DEVICE — ELECTRODE PT RET AD L9FT HI MOIST COND ADH HYDRGEL CORDED

## (undated) DEVICE — Z DISCONTINUED USE 2276105 GOWN PROTCT UNIV CHST W28IN L49IN SL 24IN BLU SPUNBOND FLM

## (undated) DEVICE — SYRINGE MED 10ML LUERLOCK TIP W/O SFTY DISP

## (undated) DEVICE — ELECTRODE,RADIOTRANSLUCENT,FOAM,3PK: Brand: MEDLINE

## (undated) DEVICE — SINGLE USE SUCTION VALVE MAJ-209: Brand: SINGLE USE SUCTION VALVE (STERILE)

## (undated) DEVICE — GLOVE ORANGE PI 8 1/2   MSG9085

## (undated) DEVICE — BOWL MED M 16OZ PLAS CAP GRAD

## (undated) DEVICE — SUTURE VCRL SZ 3-0 L18IN ABSRB UD L26MM SH 1/2 CIR J864D

## (undated) DEVICE — GAUZE,SPONGE,4"X4",8PLY,STRL,LF,10/TRAY: Brand: MEDLINE

## (undated) DEVICE — SOLUTION,SALINE,IRRGATION,500ML,STRL: Brand: MEDLINE

## (undated) DEVICE — SUTURE ETHLN SZ 3-0 L30IN NONABSORBABLE BLK FSL L30MM 3/8 1671H

## (undated) DEVICE — SYRINGE MED 10ML SLIP TIP BLNT FILL AND LUERLOCK DISP

## (undated) DEVICE — GOWN,AURORA,NONREINF,RAGLAN,XXL,STERILE: Brand: MEDLINE

## (undated) DEVICE — TRAP,MUCUS SPECIMEN, 80CC: Brand: MEDLINE

## (undated) DEVICE — PACK,UNIVERSAL,SPLIT,II,AURORA: Brand: MEDLINE